# Patient Record
Sex: FEMALE | Race: WHITE | Employment: FULL TIME | URBAN - METROPOLITAN AREA
[De-identification: names, ages, dates, MRNs, and addresses within clinical notes are randomized per-mention and may not be internally consistent; named-entity substitution may affect disease eponyms.]

---

## 2017-02-01 DIAGNOSIS — K21.9 GASTROESOPHAGEAL REFLUX DISEASE WITHOUT ESOPHAGITIS: ICD-10-CM

## 2017-02-01 RX ORDER — BUPROPION HYDROCHLORIDE 300 MG/1
300 TABLET ORAL
Qty: 90 TAB | Refills: 3 | Status: SHIPPED | OUTPATIENT
Start: 2017-02-01 | End: 2017-02-02 | Stop reason: SDUPTHER

## 2017-02-01 RX ORDER — ESCITALOPRAM OXALATE 20 MG/1
20 TABLET ORAL DAILY
Qty: 90 TAB | Refills: 3 | Status: SHIPPED | OUTPATIENT
Start: 2017-02-01 | End: 2017-02-02 | Stop reason: SDUPTHER

## 2017-02-01 RX ORDER — LOSARTAN POTASSIUM 50 MG/1
50 TABLET ORAL DAILY
Qty: 90 TAB | Refills: 3 | Status: SHIPPED | OUTPATIENT
Start: 2017-02-01 | End: 2017-02-02 | Stop reason: SDUPTHER

## 2017-02-01 RX ORDER — PANTOPRAZOLE SODIUM 40 MG/1
40 TABLET, DELAYED RELEASE ORAL DAILY
Qty: 90 TAB | Refills: 3 | Status: SHIPPED | OUTPATIENT
Start: 2017-02-01 | End: 2017-02-02 | Stop reason: SDUPTHER

## 2017-02-02 DIAGNOSIS — K21.9 GASTROESOPHAGEAL REFLUX DISEASE WITHOUT ESOPHAGITIS: ICD-10-CM

## 2017-02-03 DIAGNOSIS — K21.9 GASTROESOPHAGEAL REFLUX DISEASE WITHOUT ESOPHAGITIS: ICD-10-CM

## 2017-02-03 RX ORDER — PANTOPRAZOLE SODIUM 40 MG/1
40 TABLET, DELAYED RELEASE ORAL DAILY
Qty: 30 TAB | Refills: 3 | Status: SHIPPED | OUTPATIENT
Start: 2017-02-03 | End: 2018-04-23 | Stop reason: SDUPTHER

## 2017-02-03 RX ORDER — ESCITALOPRAM OXALATE 20 MG/1
20 TABLET ORAL DAILY
Qty: 90 TAB | Refills: 3 | Status: SHIPPED | OUTPATIENT
Start: 2017-02-03 | End: 2017-02-03 | Stop reason: SDUPTHER

## 2017-02-03 RX ORDER — ESCITALOPRAM OXALATE 20 MG/1
20 TABLET ORAL DAILY
Qty: 30 TAB | Refills: 3 | Status: SHIPPED | OUTPATIENT
Start: 2017-02-03 | End: 2017-12-18 | Stop reason: SDUPTHER

## 2017-02-03 RX ORDER — LOSARTAN POTASSIUM 50 MG/1
50 TABLET ORAL DAILY
Qty: 30 TAB | Refills: 3 | Status: SHIPPED | OUTPATIENT
Start: 2017-02-03 | End: 2018-04-23 | Stop reason: SDUPTHER

## 2017-02-03 RX ORDER — LOSARTAN POTASSIUM 50 MG/1
50 TABLET ORAL DAILY
Qty: 90 TAB | Refills: 3 | Status: SHIPPED | OUTPATIENT
Start: 2017-02-03 | End: 2017-02-03 | Stop reason: SDUPTHER

## 2017-02-03 RX ORDER — PANTOPRAZOLE SODIUM 40 MG/1
40 TABLET, DELAYED RELEASE ORAL DAILY
Qty: 90 TAB | Refills: 3 | Status: SHIPPED | OUTPATIENT
Start: 2017-02-03 | End: 2017-02-03 | Stop reason: SDUPTHER

## 2017-02-03 RX ORDER — BUPROPION HYDROCHLORIDE 300 MG/1
300 TABLET ORAL
Qty: 90 TAB | Refills: 3 | Status: SHIPPED | OUTPATIENT
Start: 2017-02-03 | End: 2017-02-03 | Stop reason: SDUPTHER

## 2017-02-03 RX ORDER — BUPROPION HYDROCHLORIDE 300 MG/1
300 TABLET ORAL
Qty: 30 TAB | Refills: 3 | Status: SHIPPED | OUTPATIENT
Start: 2017-02-03 | End: 2017-12-18 | Stop reason: SDUPTHER

## 2017-03-03 DIAGNOSIS — J20.9 BRONCHITIS WITH BRONCHOSPASM: ICD-10-CM

## 2017-03-03 RX ORDER — PROMETHAZINE HYDROCHLORIDE AND DEXTROMETHORPHAN HYDROBROMIDE 6.25; 15 MG/5ML; MG/5ML
5 SYRUP ORAL
Qty: 240 ML | Refills: 0 | OUTPATIENT
Start: 2017-03-03 | End: 2017-08-23 | Stop reason: ALTCHOICE

## 2017-03-03 RX ORDER — AMOXICILLIN AND CLAVULANATE POTASSIUM 875; 125 MG/1; MG/1
1 TABLET, FILM COATED ORAL 2 TIMES DAILY
Qty: 20 TAB | Refills: 0 | OUTPATIENT
Start: 2017-03-03 | End: 2017-03-13

## 2017-03-03 RX ORDER — PREDNISONE 10 MG/1
TABLET ORAL
Qty: 21 TAB | Refills: 0 | OUTPATIENT
Start: 2017-03-03 | End: 2017-05-26 | Stop reason: ALTCHOICE

## 2017-03-07 ENCOUNTER — HOSPITAL ENCOUNTER (OUTPATIENT)
Dept: LAB | Age: 67
Discharge: HOME | End: 2017-03-07
Payer: MEDICARE

## 2017-03-07 ENCOUNTER — OFFICE VISIT (OUTPATIENT)
Dept: FAMILY MEDICINE CLINIC | Age: 67
End: 2017-03-07

## 2017-03-07 VITALS
RESPIRATION RATE: 16 BRPM | BODY MASS INDEX: 38.86 KG/M2 | HEART RATE: 74 BPM | OXYGEN SATURATION: 97 % | SYSTOLIC BLOOD PRESSURE: 113 MMHG | HEIGHT: 68 IN | DIASTOLIC BLOOD PRESSURE: 62 MMHG | TEMPERATURE: 97.9 F | WEIGHT: 256.4 LBS

## 2017-03-07 DIAGNOSIS — J20.9 BRONCHITIS WITH BRONCHOSPASM: Primary | ICD-10-CM

## 2017-03-07 DIAGNOSIS — F32.A DEPRESSION, UNSPECIFIED DEPRESSION TYPE: ICD-10-CM

## 2017-03-07 DIAGNOSIS — Z51.81 ENCOUNTER FOR MEDICATION MONITORING: ICD-10-CM

## 2017-03-07 DIAGNOSIS — J20.9 BRONCHITIS, ACUTE, WITH BRONCHOSPASM: ICD-10-CM

## 2017-03-07 DIAGNOSIS — I10 ESSENTIAL HYPERTENSION, BENIGN: ICD-10-CM

## 2017-03-07 DIAGNOSIS — E78.00 HYPERCHOLESTEROLEMIA: ICD-10-CM

## 2017-03-07 DIAGNOSIS — R73.02 GLUCOSE INTOLERANCE (IMPAIRED GLUCOSE TOLERANCE): ICD-10-CM

## 2017-03-07 LAB
GLUCOSE POC: 129 MG/DL
HBA1C MFR BLD HPLC: 5.9 %

## 2017-03-07 PROCEDURE — 80048 BASIC METABOLIC PNL TOTAL CA: CPT

## 2017-03-07 PROCEDURE — 36415 COLL VENOUS BLD VENIPUNCTURE: CPT

## 2017-03-07 RX ORDER — FLUTICASONE PROPIONATE AND SALMETEROL 250; 50 UG/1; UG/1
1 POWDER RESPIRATORY (INHALATION) EVERY 12 HOURS
Qty: 1 EACH | Refills: 3 | Status: SHIPPED | OUTPATIENT
Start: 2017-03-07 | End: 2018-05-15

## 2017-03-07 RX ORDER — BUDESONIDE AND FORMOTEROL FUMARATE DIHYDRATE 160; 4.5 UG/1; UG/1
1 AEROSOL RESPIRATORY (INHALATION) 2 TIMES DAILY
Qty: 1 INHALER | Refills: 3 | Status: SHIPPED | OUTPATIENT
Start: 2017-03-07 | End: 2017-03-07

## 2017-03-07 RX ORDER — ALBUTEROL SULFATE 90 UG/1
1-2 AEROSOL, METERED RESPIRATORY (INHALATION)
Qty: 1 INHALER | Refills: 11 | Status: SHIPPED | OUTPATIENT
Start: 2017-03-07 | End: 2017-03-24 | Stop reason: SDUPTHER

## 2017-03-07 RX ORDER — ALBUTEROL SULFATE 90 UG/1
2 AEROSOL, METERED RESPIRATORY (INHALATION)
Qty: 1 INHALER | Refills: 3 | Status: SHIPPED | OUTPATIENT
Start: 2017-03-07 | End: 2017-03-07

## 2017-03-07 RX ORDER — DESONIDE 0.5 MG/G
OINTMENT TOPICAL
Refills: 2 | COMMUNITY
Start: 2017-01-11 | End: 2019-04-30

## 2017-03-07 RX ORDER — PREDNISONE 10 MG/1
TABLET ORAL
Qty: 50 TAB | Refills: 0 | Status: SHIPPED | OUTPATIENT
Start: 2017-03-07 | End: 2017-05-26 | Stop reason: ALTCHOICE

## 2017-03-07 NOTE — PROGRESS NOTES
Chief Complaint   Patient presents with    Hypertension    Cough     pt coughing up green sputum x 5 days         BMP 10/26/2016    Lipid 10/26/2016    A1C 10/26/2016    Mammogram 4/8/2015    Eye exam 3/2016 by MD at [facility]. Colonoscopy 5/20/2016 by Dr. Malik Gonzalez- repeat in 5 yrs.

## 2017-03-07 NOTE — PROGRESS NOTES
HISTORY OF PRESENT ILLNESS  Leslie Lyles is a 73 y.o. female. HPI   Follow up on chronic medical problems. C/o nasal congestion and chest congestion and cough for the past week. Coughing up phlegm. No fever or chills noted. Throat is scratchy. Has post nasal drainage. No chest pain or SOB. Has had some slight wheezing noted. Started on Augmentin on this past Sunday but chest is till feeling congested. Started on steroids on this past Friday but still has the wheezing. Cardiovascular Review:  The patient has hypertension, hyperlipidemia and obesity. H/o PAF and SVT. S/p ablation. Diet and Lifestyle: generally follows a low fat low cholesterol diet, generally follows a low sodium diet, follows a diabetic diet regularly, exercises regularly, attended class at the [employer] which was helpful. Home BP Monitoring: is not measured at home. Pertinent ROS: taking medications as instructed, no medication side effects noted, no TIA's, no chest pain on exertion, no dyspnea on exertion, no swelling of ankles. Diabetes Mellitus:  She has glucose intolerance. Last a1c level however was 5.4% in 1/16. Diabetic ROS - diabetic diet compliance: compliant most of the time, further diabetic ROS: no polyuria or polydipsia, no chest pain, dyspnea or TIA's, no numbness, tingling or pain in extremities, no unusual visual symptoms. Lab review: orders written for new lab studies as appropriate; see orders. Depression Review:  Patient is seen for followup of depression. Treatment includes Wellbutrin, Lexapro. Ongoing symptoms include none, feeling great. She denies depressed mood, insomnia and fatigue. She experiences the following side effects from the treatment: none.     Patient Active Problem List   Diagnosis Code    Depression F32.9    Anxiety F41.9    Hypercholesterolemia E78.00    Renal artery stenosis (HCC) I70.1    History of hepatitis B Z86.19    Glucose intolerance (impaired glucose tolerance) R73.02    Essential hypertension, benign I5    Encounter for medication monitoring Z51.81    H/O atrial fibrillation without current medication Z86.79       Current Outpatient Prescriptions   Medication Sig Dispense Refill    desonide (DESOWEN) 0.05 % topical ointment as needed  2    albuterol (PROVENTIL HFA, VENTOLIN HFA, PROAIR HFA) 90 mcg/actuation inhaler Take 1-2 Puffs by inhalation every four (4) hours as needed for Wheezing. 1 Inhaler 11    predniSONE (DELTASONE) 10 mg tablet Taper as directed. 50 Tab 0    budesonide-formoterol (SYMBICORT) 160-4.5 mcg/actuation HFA inhaler Take 1 Puff by inhalation two (2) times a day. 1 Inhaler 3    promethazine-dextromethorphan (PROMETHAZINE-DM) 6.25-15 mg/5 mL syrup Take 5 mL by mouth four (4) times daily as needed for Cough. 240 mL 0    predniSONE (STERAPRED DS) 10 mg dose pack See administration instruction per 10mg dose pack 21 Tab 0    amoxicillin-clavulanate (AUGMENTIN) 875-125 mg per tablet Take 1 Tab by mouth two (2) times a day for 10 days. 20 Tab 0    losartan (COZAAR) 50 mg tablet Take 1 Tab by mouth daily. 30 Tab 3    pantoprazole (PROTONIX) 40 mg tablet Take 1 Tab by mouth daily. 30 Tab 3    escitalopram oxalate (LEXAPRO) 20 mg tablet Take 1 Tab by mouth daily. 30 Tab 3    buPROPion XL (WELLBUTRIN XL) 300 mg XL tablet Take 1 Tab by mouth every morning. 30 Tab 3    estradiol (ESTRACE) 0.5 mg tablet Take 0.5 mg by mouth daily.  chlorpheniramine-HYDROcodone (TUSSIONEX) 10-8 mg/5 mL suspension Take 5 mL by mouth every twelve (12) hours as needed for Cough or Congestion. 180 mL 1    fluorouracil (EFUDEX) 5 % chemo cream daily as needed. 0    mupirocin (BACTROBAN) 2 % ointment as needed. 11    nystatin (MYCOSTATIN) topical cream Apply  to affected area as needed. 11    triamcinolone acetonide (KENALOG) 0.1 % ointment Apply  to affected area as needed.   11    metoprolol succinate (TOPROL-XL) 100 mg XL tablet Take 1 Tab by mouth daily. 90 Tab 3    loratadine (CLARITIN) 10 mg tablet Take 10 mg by mouth daily as needed for Allergies.  fluticasone (FLONASE) 50 mcg/actuation nasal spray 2 Sprays by Both Nostrils route daily as needed for Rhinitis. 3 Bottle 3    cinnamon bark (CINNAMON) 500 mg cap Take 1,000 mg by mouth two (2) times a day.  KRILL OIL PO Take 300 mg by mouth daily.  Cholecalciferol, Vitamin D3, (VITAMIN D) 2,000 unit Cap Take 1 Tab by mouth daily.  MULTIVITAMIN PO Take 1 Tab by mouth daily.          Allergies   Allergen Reactions    Iodinated Contrast Media - Oral And Iv Dye Swelling    Clindamycin Nausea and Vomiting    Codeine Other (comments)     Headaches      Morphine Other (comments)     headache       Past Medical History:   Diagnosis Date    Anxiety     Arrhythmia     A-Fib    Depression     History of hepatitis B     Hypercholesterolemia     Hypertension     Renal artery stenosis ([facility] [address] 2006       Past Surgical History:   Procedure Laterality Date    CARDIAC SURG PROCEDURE UNLIST  4/28/2015    cardiac ablation     HX BLEPHAROPLASTY  04/26/2016    HX HYSTERECTOMY  1988    [place] COLONOSCOPY FLX DX W/COLLJ SPEC WHEN PFRMD  12/01/2010    dr Hilario You       Family History   Problem Relation Age of Onset    Cancer Mother     Cancer Father      lung    Heart Disease Brother     Heart Attack Brother     Lung Disease Brother      COPD    No Known Problems Sister     Hypertension Brother        Social History   Substance Use Topics    Smoking status: Never Smoker    Smokeless tobacco: Never Used    Alcohol use 0.0 oz/week     0 Standard drinks or equivalent per week      Comment: socially        Lab Results  Component Value Date/Time   WBC 5.9 10/26/2016 07:50 AM   HGB 13.7 10/26/2016 07:50 AM   HCT 40.9 10/26/2016 07:50 AM   PLATELET 299 00/85/4997 07:50 AM   MCV 93 10/26/2016 07:50 AM       Lab Results  Component Value Date/Time   Hemoglobin A1c 5.7 03/27/2013 01:20 PM   Hemoglobin A1c 6.0 02/11/2012 11:34 AM   Hemoglobin A1c 6.0 10/22/2011 09:00 AM   Glucose 128 10/26/2016 07:50 AM   Glucose  03/07/2017 02:35 PM   LDL, calculated 152 10/26/2016 07:50 AM   Creatinine 1.00 10/26/2016 07:50 AM      Lab Results  Component Value Date/Time   Cholesterol, total 240 10/26/2016 07:50 AM   HDL Cholesterol 54 10/26/2016 07:50 AM   LDL, calculated 152 10/26/2016 07:50 AM   Triglyceride 168 10/26/2016 07:50 AM       Lab Results  Component Value Date/Time   TSH 3.880 10/26/2016 07:50 AM   T4, Free 1.14 01/28/2015 02:43 PM      Lab Results   Component Value Date/Time    Sodium 142 10/26/2016 07:50 AM    Potassium 4.6 10/26/2016 07:50 AM    Chloride 101 10/26/2016 07:50 AM    CO2 25 10/26/2016 07:50 AM    Glucose 128 10/26/2016 07:50 AM    BUN 14 10/26/2016 07:50 AM    Creatinine 1.00 10/26/2016 07:50 AM    BUN/Creatinine ratio 14 10/26/2016 07:50 AM    GFR est AA 68 10/26/2016 07:50 AM    GFR est non-AA 59 10/26/2016 07:50 AM    Calcium 9.2 10/26/2016 07:50 AM    Bilirubin, total 0.3 01/20/2016 10:07 AM    ALT (SGPT) 20 01/20/2016 10:07 AM    AST (SGOT) 24 01/20/2016 10:07 AM    Alk. phosphatase 81 01/20/2016 10:07 AM    Protein, total 6.4 01/20/2016 10:07 AM    Albumin 4.0 01/20/2016 10:07 AM    A-G Ratio 1.7 01/20/2016 10:07 AM      Lab Results   Component Value Date/Time    Hemoglobin A1c 5.7 03/27/2013 01:20 PM    Hemoglobin A1c (POC) 5.9 03/07/2017 02:35 PM         Review of Systems   Constitutional: Negative for malaise/fatigue. HENT: Positive for congestion. Eyes: Negative for blurred vision. Respiratory: Positive for cough and sputum production. Negative for shortness of breath. Cardiovascular: Negative for chest pain, palpitations and leg swelling. Gastrointestinal: Negative for abdominal pain, constipation and heartburn. Genitourinary: Negative for dysuria, frequency and urgency. Musculoskeletal: Negative for back pain and joint pain.    Neurological: Negative for dizziness, tingling and headaches. Endo/Heme/Allergies: Positive for environmental allergies. Psychiatric/Behavioral: Negative for depression. The patient does not have insomnia. Physical Exam   Constitutional: She appears well-developed and well-nourished. /62 (BP 1 Location: Left arm, BP Patient Position: Sitting)  Pulse 74  Temp 97.9 °F (36.6 °C) (Oral)   Resp 16  Ht 5' 8\" (1.727 m)  Wt 256 lb 6.4 oz (116.3 kg)  SpO2 97%   L/min  BMI 38.99 kg/m2     HENT:   Right Ear: Tympanic membrane and ear canal normal.   Left Ear: Tympanic membrane and ear canal normal.   Nose: Mucosal edema and rhinorrhea present. Mouth/Throat: Mucous membranes are normal. Posterior oropharyngeal erythema present. No oropharyngeal exudate. Neck: Trachea normal, normal range of motion and full passive range of motion without pain. Neck supple. No edema present. No thyromegaly present. Cardiovascular: Normal rate and regular rhythm. No murmur heard. Pulmonary/Chest: Effort normal and breath sounds normal. She has no wheezes. She has no rales. Abdominal: Soft. Normal appearance and bowel sounds are normal. There is no tenderness. Musculoskeletal: Normal range of motion. She exhibits no edema. Lymphadenopathy:     She has no cervical adenopathy. Skin: Skin is warm and dry. Psychiatric: She has a normal mood and affect. Nursing note and vitals reviewed. ASSESSMENT and PLAN  Miranda Hdz was seen today for hypertension and cough. Diagnoses and all orders for this visit:    Bronchitis with bronchospasm  -     XR CHEST PA LAT; Future  -     albuterol (PROVENTIL HFA, VENTOLIN HFA, PROAIR HFA) 90 mcg/actuation inhaler; Take 1-2 Puffs by inhalation every four (4) hours as needed for Wheezing.  -     predniSONE (DELTASONE) 10 mg tablet; Taper as directed. -     budesonide-formoterol (SYMBICORT) 160-4.5 mcg/actuation HFA inhaler; Take 1 Puff by inhalation two (2) times a day. Complete Augmentin. Essential hypertension, benign  Stable     Hypercholesterolemia  Will return to get fasting labs done. Glucose intolerance (impaired glucose tolerance)  -     AMB POC HEMOGLOBIN A1C  -     AMB POC GLUCOSE, QUANTITATIVE, BLOOD    Depression, unspecified depression type  Stable     Encounter for medication monitoring  -     METABOLIC PANEL, BASIC      Follow-up Disposition:  Return in about 6 months (around 9/7/2017). reviewed diet, exercise and weight control  cardiovascular risk and specific lipid/LDL goals reviewed  reviewed medications and side effects in detail  specific diabetic recommendations: low cholesterol diet, weight control and daily exercise discussed and glycohemoglobin and other lab monitoring discussed     I have discussed diagnosis listed in this note with pt and/or family. I have discussed treatment plans and options and the risk/benefit analysis of those options, including safe use of medications and possible medication side effects. Through the use of shared decision making we have agreed to the above plan. The patient has received an after-visit summary and questions were answered concerning future plans and follow up. Advise pt of any urgent changes then to proceed to the ER.

## 2017-03-07 NOTE — MR AVS SNAPSHOT
Visit Information Date & Time Provider Department Dept. Phone Encounter #  
 3/7/2017  2:00 PM Meche VasquezPop [phone] [id number] Follow-up Instructions Return in about 6 months (around 9/7/2017). Upcoming Health Maintenance Date Due Pneumococcal 65+ Low/Medium Risk (1 of 2 - PCV13) 8/31/2016 MEDICARE YEARLY EXAM 8/31/2016 GLAUCOMA SCREENING Q2Y 3/14/2018 BREAST CANCER SCRN MAMMOGRAM 4/8/2018 COLONOSCOPY 5/20/2021 DTaP/Tdap/Td series (2 - Td) 3/19/2024 Allergies as of 3/7/2017  Review Complete On: 3/7/2017 By: Meche Vasquez MD  
  
 Severity Noted Reaction Type Reactions Iodinated Contrast Media - Oral And Iv Dye High 03/27/2013   Systemic Swelling Clindamycin  06/27/2011    Nausea and Vomiting Codeine  06/27/2011    Other (comments) Headaches Morphine  06/27/2011    Other (comments)  
 headache Current Immunizations  Reviewed on 3/7/2017 Name Date Influenza Vaccine 10/17/2016, 10/12/2015 Influenza Vaccine Split 9/24/2012 Tdap 3/19/2014 Zoster Vaccine, Live 6/1/2014 Reviewed by Meche Vasquez MD on 3/7/2017 at  1:59 PM  
 Reviewed by Lakisha Patricio LPN on 9/5/8518 at  6:16 PM  
You Were Diagnosed With   
  
 Codes Comments Bronchitis with bronchospasm    -  Primary ICD-10-CM: J20.9 ICD-9-CM: 703 Essential hypertension, benign     ICD-10-CM: I10 
ICD-9-CM: 401.1 Glucose intolerance (impaired glucose tolerance)     ICD-10-CM: R73.02 
ICD-9-CM: 790.22 Depression, unspecified depression type     ICD-10-CM: F32.9 ICD-9-CM: 133 Hypercholesterolemia     ICD-10-CM: E78.00 ICD-9-CM: 272.0 Encounter for medication monitoring     ICD-10-CM: Z51.81 
ICD-9-CM: V58.83 Bronchitis, acute, with bronchospasm     ICD-10-CM: J20.9 ICD-9-CM: 466.0 Vitals BP Pulse Temp Resp Height(growth percentile) Weight(growth percentile) 113/62 (BP 1 Location: Left arm, BP Patient Position: Sitting) 74 97.9 °F (36.6 °C) (Oral) 16 5' 8\" (1.727 m) 256 lb 6.4 oz (116.3 kg) SpO2 PF BMI OB Status Smoking Status 97% 320 L/min 38.99 kg/m2 Hysterectomy Never Smoker BMI and BSA Data Body Mass Index Body Surface Area  
 38.99 kg/m 2 2.36 m 2 Preferred Pharmacy Pharmacy Name Phone [facility] [phone] [facility] [phone] Your Updated Medication List  
  
   
This list is accurate as of: 3/7/17  3:01 PM.  Always use your most recent med list.  
  
  
  
  
 albuterol 90 mcg/actuation inhaler Commonly known as:  PROVENTIL HFA, VENTOLIN HFA, PROAIR HFA Take 1-2 Puffs by inhalation every four (4) hours as needed for Wheezing. amoxicillin-clavulanate 875-125 mg per tablet Commonly known as:  AUGMENTIN Take 1 Tab by mouth two (2) times a day for 10 days. budesonide-formoterol 160-4.5 mcg/actuation HFA inhaler Commonly known as:  SYMBICORT Take 1 Puff by inhalation two (2) times a day. buPROPion  mg XL tablet Commonly known as:  Nataly Bannister Take 1 Tab by mouth every morning. chlorpheniramine-HYDROcodone 10-8 mg/5 mL suspension Commonly known as:  Rozina Horsfall Take 5 mL by mouth every twelve (12) hours as needed for Cough or Congestion. CINNAMON 500 mg Cap Generic drug:  cinnamon bark Take 1,000 mg by mouth two (2) times a day. CLARITIN 10 mg tablet Generic drug:  loratadine Take 10 mg by mouth daily as needed for Allergies. desonide 0.05 % topical ointment Commonly known as:  DESOWEN  
as needed  
  
 escitalopram oxalate 20 mg tablet Commonly known as:  Mauro Andino Take 1 Tab by mouth daily. estradiol 0.5 mg tablet Commonly known as:  ESTRACE Take 0.5 mg by mouth daily. fluorouracil 5 % chemo cream  
Commonly known as:  EFUDEX  
daily as needed. fluticasone 50 mcg/actuation nasal spray Commonly known as:  [employer] 2 Sprays by Both Nostrils route daily as needed for Rhinitis. KRILL OIL PO Take 300 mg by mouth daily. losartan 50 mg tablet Commonly known as:  COZAAR Take 1 Tab by mouth daily. metoprolol succinate 100 mg tablet Commonly known as:  TOPROL-XL Take 1 Tab by mouth daily. MULTIVITAMIN PO Take 1 Tab by mouth daily. mupirocin 2 % ointment Commonly known as:  [facility] [facility]  
as needed. nystatin topical cream  
Commonly known as:  MYCOSTATIN Apply  to affected area as needed. pantoprazole 40 mg tablet Commonly known as:  PROTONIX Take 1 Tab by mouth daily. * predniSONE 10 mg dose pack Commonly known as:  STERAPRED DS See administration instruction per 10mg dose pack * predniSONE 10 mg tablet Commonly known as:  Maria A Pittman Taper as directed. promethazine-dextromethorphan 6.25-15 mg/5 mL syrup Commonly known as:  PROMETHAZINE-DM Take 5 mL by mouth four (4) times daily as needed for Cough. triamcinolone acetonide 0.1 % ointment Commonly known as:  KENALOG Apply  to affected area as needed. VITAMIN D 2,000 unit Cap capsule Generic drug:  Cholecalciferol (Vitamin D3) Take 1 Tab by mouth daily. * Notice: This list has 2 medication(s) that are the same as other medications prescribed for you. Read the directions carefully, and ask your doctor or other care provider to review them with you. Prescriptions Sent to Pharmacy Refills  
 albuterol (PROVENTIL HFA, VENTOLIN HFA, PROAIR HFA) 90 mcg/actuation inhaler 11 Sig: Take 1-2 Puffs by inhalation every four (4) hours as needed for Wheezing. Class: Normal  
 Pharmacy: [facility] [address] [place] [address] - [address] [facility] Ph #: [phone] Route: Inhalation  
 predniSONE (DELTASONE) 10 mg tablet 0 Sig: Taper as directed. Class: Normal  
 Pharmacy: [facility] [address] [place] [address] Ph #: [phone]  
 budesonide-formoterol (SYMBICORT) 160-4.5 mcg/actuation HFA inhaler 3 Sig: Take 1 Puff by inhalation two (2) times a day. Class: Normal  
 Pharmacy: [facility] [address] - [address] [place] [address] Ph #: [phone] Route: Inhalation We Performed the Following AMB POC GLUCOSE, QUANTITATIVE, BLOOD [63786 CPT(R)] AMB POC HEMOGLOBIN A1C [62823 CPT(R)] METABOLIC PANEL, BASIC [90807 CPT(R)] Follow-up Instructions Return in about 6 months (around 9/7/2017). To-Do List   
 03/07/2017 Imaging:  XR CHEST PA LAT Introducing [facility]! Dear Layo Alvarado: 
Thank you for requesting a [employer] account. Our records indicate that you already have an active [employer] account. You can access your account anytime at [email or web address] Did you know that you can access your hospital and ER discharge instructions at any time in [employer]? You can also review all of your test results from your hospital stay or ER visit. Additional Information If you have questions, please visit the Frequently Asked Questions section of the [employer] website at [email or web address] Remember, [employer] is NOT to be used for urgent needs. For medical emergencies, dial 911. Now available from your iPhone and Android! Please provide this summary of care documentation to your next provider. Your primary care clinician is listed as Krysten Calloway. If you have any questions after today's visit, please call [phone].

## 2017-03-08 LAB
BUN SERPL-MCNC: 17 MG/DL (ref 8–27)
BUN/CREAT SERPL: 15 (ref 11–26)
CALCIUM SERPL-MCNC: 9.5 MG/DL (ref 8.7–10.3)
CHLORIDE SERPL-SCNC: 100 MMOL/L (ref 96–106)
CO2 SERPL-SCNC: 25 MMOL/L (ref 18–29)
CREAT SERPL-MCNC: 1.14 MG/DL (ref 0.57–1)
GLUCOSE SERPL-MCNC: 130 MG/DL (ref 65–99)
INTERPRETATION: NORMAL
POTASSIUM SERPL-SCNC: 4.4 MMOL/L (ref 3.5–5.2)
SODIUM SERPL-SCNC: 144 MMOL/L (ref 134–144)

## 2017-03-24 DIAGNOSIS — J20.9 BRONCHITIS WITH BRONCHOSPASM: ICD-10-CM

## 2017-03-24 RX ORDER — ALBUTEROL SULFATE 90 UG/1
1-2 AEROSOL, METERED RESPIRATORY (INHALATION)
Qty: 1 INHALER | Refills: 11 | Status: SHIPPED | OUTPATIENT
Start: 2017-03-24

## 2017-05-16 ENCOUNTER — TELEPHONE (OUTPATIENT)
Dept: FAMILY MEDICINE CLINIC | Age: 67
End: 2017-05-16

## 2017-05-16 NOTE — TELEPHONE ENCOUNTER
----- Message from Devin Uribe sent at 5/16/2017  8:33 AM EDT -----  Regarding: /Telephone  Pt stated she is still coughing up green and on day 5 of \"Augmentin\" Rx and wants to know what else is there to do. Best contact [phone].

## 2017-05-23 DIAGNOSIS — J20.9 BRONCHITIS, ACUTE, WITH BRONCHOSPASM: ICD-10-CM

## 2017-05-23 NOTE — TELEPHONE ENCOUNTER
Pt would like an rx for chlorpheniramine-HYDROcodone (TUSSIONEX) 10-8 mg/5 mL suspension    Please call # [phone] when ready for

## 2017-05-26 ENCOUNTER — OFFICE VISIT (OUTPATIENT)
Dept: FAMILY MEDICINE CLINIC | Age: 67
End: 2017-05-26

## 2017-05-26 VITALS
SYSTOLIC BLOOD PRESSURE: 140 MMHG | OXYGEN SATURATION: 97 % | HEART RATE: 70 BPM | WEIGHT: 265.8 LBS | BODY MASS INDEX: 40.28 KG/M2 | TEMPERATURE: 97.1 F | HEIGHT: 68 IN | RESPIRATION RATE: 16 BRPM | DIASTOLIC BLOOD PRESSURE: 69 MMHG

## 2017-05-26 DIAGNOSIS — J20.9 ACUTE BRONCHITIS WITH BRONCHOSPASM: Primary | ICD-10-CM

## 2017-05-26 RX ORDER — ALBUTEROL SULFATE 0.83 MG/ML
2.5 SOLUTION RESPIRATORY (INHALATION)
Qty: 24 EACH | Refills: 11 | Status: SHIPPED | OUTPATIENT
Start: 2017-05-26

## 2017-05-26 RX ORDER — IPRATROPIUM BROMIDE AND ALBUTEROL SULFATE 2.5; .5 MG/3ML; MG/3ML
3 SOLUTION RESPIRATORY (INHALATION)
Qty: 30 NEBULE | Refills: 5 | Status: CANCELLED | OUTPATIENT
Start: 2017-05-26

## 2017-05-26 RX ORDER — PREDNISONE 10 MG/1
TABLET ORAL
Qty: 50 TAB | Refills: 1 | Status: SHIPPED | OUTPATIENT
Start: 2017-05-26 | End: 2017-08-23 | Stop reason: ALTCHOICE

## 2017-05-26 RX ORDER — DEXAMETHASONE SODIUM PHOSPHATE 10 MG/ML
10 INJECTION INTRAMUSCULAR; INTRAVENOUS ONCE
Qty: 1 ML | Refills: 0
Start: 2017-05-26 | End: 2017-05-26 | Stop reason: SDUPTHER

## 2017-05-26 RX ORDER — DEXAMETHASONE SODIUM PHOSPHATE 10 MG/ML
10 INJECTION INTRAMUSCULAR; INTRAVENOUS ONCE
Qty: 1 ML | Refills: 0
Start: 2017-05-26 | End: 2017-05-26

## 2017-05-26 RX ORDER — HYDROCODONE POLISTIREX AND CHLORPHENIRAMINE POLISTIREX 10; 8 MG/5ML; MG/5ML
5 SUSPENSION, EXTENDED RELEASE ORAL
Qty: 180 ML | Refills: 0 | Status: SHIPPED | OUTPATIENT
Start: 2017-05-26 | End: 2017-10-16 | Stop reason: ALTCHOICE

## 2017-05-26 RX ORDER — LEVOFLOXACIN 500 MG/1
500 TABLET, FILM COATED ORAL DAILY
Qty: 7 TAB | Refills: 0 | Status: SHIPPED | OUTPATIENT
Start: 2017-05-26 | End: 2017-08-23 | Stop reason: SDUPTHER

## 2017-05-26 RX ORDER — ALBUTEROL SULFATE 0.83 MG/ML
2.5 SOLUTION RESPIRATORY (INHALATION) ONCE
Qty: 1 EACH | Refills: 0
Start: 2017-05-26 | End: 2017-05-26

## 2017-05-26 RX ORDER — TRIAMCINOLONE ACETONIDE 1 MG/G
CREAM TOPICAL
Refills: 1 | COMMUNITY
Start: 2017-05-15 | End: 2018-05-15 | Stop reason: SDUPTHER

## 2017-05-26 NOTE — PROGRESS NOTES
HISTORY OF PRESENT ILLNESS  Nubia Varma is a 73 y.o. female. HPI   C/o nasal congestion and chest congestion and cough for the past 2 weeks. Coughing up phlegm. No fever or chills noted. Throat is scratchy. Has post nasal drainage. No chest pain or SOB. Has had some slight wheezing noted. Started on Augmentin on this past Sunday but chest is still feeling congested. Started on steroids on this past Monday but still has been wheezing. Patient Active Problem List   Diagnosis Code    Depression F32.9    Anxiety F41.9    Hypercholesterolemia E78.00    Renal artery stenosis (HCC) I70.1    History of hepatitis B Z86.19    Glucose intolerance (impaired glucose tolerance) R73.02    Essential hypertension, benign I10    Encounter for medication monitoring Z51.81    H/O atrial fibrillation without current medication Z86.79       Current Outpatient Prescriptions   Medication Sig Dispense Refill    triamcinolone acetonide (KENALOG) 0.1 % topical cream APPLY TO AFFECTED AREA BID FOR 7 DAYS  1    levoFLOXacin (LEVAQUIN) 500 mg tablet Take 1 Tab by mouth daily for 7 days. 7 Tab 0    predniSONE (DELTASONE) 10 mg tablet Taper as directed 50 Tab 1    albuterol (PROVENTIL VENTOLIN) 2.5 mg /3 mL (0.083 %) nebulizer solution 3 mL by Nebulization route every four (4) hours as needed for Wheezing. 24 Each 11    albuterol (PROVENTIL HFA, VENTOLIN HFA, PROAIR HFA) 90 mcg/actuation inhaler Take 1-2 Puffs by inhalation every four (4) hours as needed for Wheezing. 1 Inhaler 11    desonide (DESOWEN) 0.05 % topical ointment as needed  2    fluticasone-salmeterol (ADVAIR) 250-50 mcg/dose diskus inhaler Take 1 Puff by inhalation every twelve (12) hours. 1 Each 3    promethazine-dextromethorphan (PROMETHAZINE-DM) 6.25-15 mg/5 mL syrup Take 5 mL by mouth four (4) times daily as needed for Cough. 240 mL 0    losartan (COZAAR) 50 mg tablet Take 1 Tab by mouth daily.  30 Tab 3    pantoprazole (PROTONIX) 40 mg tablet Take 1 Tab by mouth daily. 30 Tab 3    escitalopram oxalate (LEXAPRO) 20 mg tablet Take 1 Tab by mouth daily. 30 Tab 3    buPROPion XL (WELLBUTRIN XL) 300 mg XL tablet Take 1 Tab by mouth every morning. 30 Tab 3    estradiol (ESTRACE) 0.5 mg tablet Take 0.5 mg by mouth daily.  fluorouracil (EFUDEX) 5 % chemo cream daily as needed. 0    mupirocin (BACTROBAN) 2 % ointment as needed. 11    nystatin (MYCOSTATIN) topical cream Apply  to affected area as needed. 11    triamcinolone acetonide (KENALOG) 0.1 % ointment Apply  to affected area as needed. 11    metoprolol succinate (TOPROL-XL) 100 mg XL tablet Take 1 Tab by mouth daily. 90 Tab 3    loratadine (CLARITIN) 10 mg tablet Take 10 mg by mouth daily as needed for Allergies.  fluticasone (FLONASE) 50 mcg/actuation nasal spray 2 Sprays by Both Nostrils route daily as needed for Rhinitis. 3 Bottle 3    cinnamon bark (CINNAMON) 500 mg cap Take 1,000 mg by mouth two (2) times a day.  KRILL OIL PO Take 300 mg by mouth daily.  Cholecalciferol, Vitamin D3, (VITAMIN D) 2,000 unit Cap Take 1 Tab by mouth daily.  MULTIVITAMIN PO Take 1 Tab by mouth daily.  chlorpheniramine-HYDROcodone (TUSSIONEX) 10-8 mg/5 mL suspension Take 5 mL by mouth every twelve (12) hours as needed for Cough or Congestion.  180 mL 0       Allergies   Allergen Reactions    Iodinated Contrast Media - Oral And Iv Dye Swelling    Clindamycin Nausea and Vomiting    Codeine Other (comments)     Headaches      Morphine Other (comments)     headache         Past Medical History:   Diagnosis Date    Anxiety     Arrhythmia     A-Fib    Depression     History of hepatitis B     Hypercholesterolemia     Hypertension     Renal artery stenosis ([facility] [address] 2006         Past Surgical History:   Procedure Laterality Date    CARDIAC SURG PROCEDURE UNLIST  4/28/2015    cardiac ablation     HX BLEPHAROPLASTY  04/26/2016    HX HYSTERECTOMY  1988    MS COLONOSCOPY FLX DX W/COLLJ SPEC WHEN PFRMD  12/01/2010    dr Pa Daly         Family History   Problem Relation Age of Onset    Cancer Mother     Cancer Father      lung    Heart Disease Brother     Heart Attack Brother     Lung Disease Brother      COPD    No Known Problems Sister     Hypertension Brother        Social History   Substance Use Topics    Smoking status: Never Smoker    Smokeless tobacco: Never Used    Alcohol use 0.0 oz/week     0 Standard drinks or equivalent per week      Comment: socially        Lab Results   Component Value Date/Time    WBC 5.9 10/26/2016 07:50 AM    HGB 13.7 10/26/2016 07:50 AM    HCT 40.9 10/26/2016 07:50 AM    PLATELET 528 43/35/9962 07:50 AM    MCV 93 10/26/2016 07:50 AM       Lab Results   Component Value Date/Time    Hemoglobin A1c 5.7 03/27/2013 01:20 PM    Hemoglobin A1c 6.0 02/11/2012 11:34 AM    Hemoglobin A1c 6.0 10/22/2011 09:00 AM    Glucose 130 03/07/2017 02:35 PM    Glucose  03/07/2017 02:35 PM    LDL, calculated 152 10/26/2016 07:50 AM    Creatinine 1.14 03/07/2017 02:35 PM      Lab Results   Component Value Date/Time    Cholesterol, total 240 10/26/2016 07:50 AM    HDL Cholesterol 54 10/26/2016 07:50 AM    LDL, calculated 152 10/26/2016 07:50 AM    Triglyceride 168 10/26/2016 07:50 AM       Lab Results   Component Value Date/Time    TSH 3.880 10/26/2016 07:50 AM    T4, Free 1.14 01/28/2015 02:43 PM      Lab Results   Component Value Date/Time    Sodium 144 03/07/2017 02:35 PM    Potassium 4.4 03/07/2017 02:35 PM    Chloride 100 03/07/2017 02:35 PM    CO2 25 03/07/2017 02:35 PM    Glucose 130 03/07/2017 02:35 PM    BUN 17 03/07/2017 02:35 PM    Creatinine 1.14 03/07/2017 02:35 PM    BUN/Creatinine ratio 15 03/07/2017 02:35 PM    GFR est AA 58 03/07/2017 02:35 PM    GFR est non-AA 51 03/07/2017 02:35 PM    Calcium 9.5 03/07/2017 02:35 PM    Bilirubin, total 0.3 01/20/2016 10:07 AM    ALT (SGPT) 20 01/20/2016 10:07 AM    AST (SGOT) 24 01/20/2016 10:07 AM    Alk. phosphatase 81 01/20/2016 10:07 AM    Protein, total 6.4 01/20/2016 10:07 AM    Albumin 4.0 01/20/2016 10:07 AM    A-G Ratio 1.7 01/20/2016 10:07 AM      Lab Results   Component Value Date/Time    Hemoglobin A1c 5.7 03/27/2013 01:20 PM    Hemoglobin A1c (POC) 5.9 03/07/2017 02:35 PM         Review of Systems   Constitutional: Negative for malaise/fatigue. HENT: Positive for congestion. Eyes: Negative for blurred vision. Respiratory: Positive for cough and sputum production. Negative for shortness of breath. Cardiovascular: Negative for chest pain, palpitations and leg swelling. Gastrointestinal: Negative for abdominal pain, constipation and heartburn. Genitourinary: Negative for dysuria, frequency and urgency. Musculoskeletal: Negative for back pain and joint pain. Neurological: Negative for dizziness, tingling and headaches. Endo/Heme/Allergies: Positive for environmental allergies. Psychiatric/Behavioral: Negative for depression. The patient does not have insomnia. Physical Exam   Constitutional: She appears well-developed and well-nourished. Visit Vitals    /69 (BP 1 Location: Left arm, BP Patient Position: Sitting)    Pulse 70    Temp 97.1 °F (36.2 °C) (Oral)    Resp 16    Ht 5' 8\" (1.727 m)    Wt 265 lb 12.8 oz (120.6 kg)    SpO2 97%     L/min    BMI 40.41 kg/m2          HENT:   Right Ear: Tympanic membrane and ear canal normal.   Left Ear: Tympanic membrane and ear canal normal.   Nose: Mucosal edema and rhinorrhea present. Mouth/Throat: Mucous membranes are normal. Posterior oropharyngeal erythema present. No oropharyngeal exudate. Neck: Trachea normal, normal range of motion and full passive range of motion without pain. Neck supple. No edema present. No thyromegaly present. Cardiovascular: Normal rate and regular rhythm. No murmur heard. Pulmonary/Chest: Effort normal and breath sounds normal. She has wheezes and rhonchi. She has no rales. Abdominal: Soft. Normal appearance and bowel sounds are normal. There is no tenderness. Musculoskeletal: Normal range of motion. She exhibits no edema. Lymphadenopathy:     She has no cervical adenopathy. Skin: Skin is warm and dry. Psychiatric: She has a normal mood and affect. Nursing note and vitals reviewed. ASSESSMENT and PLAN  Bob Rdz was seen today for cough. Diagnoses and all orders for this visit:    Acute bronchitis with bronchospasm  -     XR CHEST PA LAT; Future  No acute infiltrate.    -     levoFLOXacin (LEVAQUIN) 500 mg tablet; Take 1 Tab by mouth daily for 7 days. -     predniSONE (DELTASONE) 10 mg tablet; Taper as directed  -     albuterol (PROVENTIL VENTOLIN) 2.5 mg /3 mL (0.083 %) nebulizer solution; 3 mL by Nebulization route every four (4) hours as needed for Wheezing. Follow-up Disposition: reviewed medications and side effects in detail    I have discussed diagnosis listed in this note with pt and/or family. I have discussed treatment plans and options and the risk/benefit analysis of those options, including safe use of medications and possible medication side effects. Through the use of shared decision making we have agreed to the above plan. The patient has received an after-visit summary and questions were answered concerning future plans and follow up. Advise pt of any urgent changes then to proceed to the ER.

## 2017-05-26 NOTE — MR AVS SNAPSHOT
Visit Information Date & Time Provider Department Dept. Phone Encounter #  
 5/26/2017 12:15 PM Sonia Kim MD [facility] [phone] [id number] Your Appointments 9/5/2017  8:15 AM  
COMPLETE PHYSICAL with Sonia Kim MD  
[facility] [address]) Appt Note: Medicare Wellness; cpe per Dr ISRAEL/S  
 [address] [facility] [place] [id number] [phone] [address] [place] [address] [address] Upcoming Health Maintenance Date Due Pneumococcal 65+ Low/Medium Risk (1 of 2 - PCV13) 8/31/2016 MEDICARE YEARLY EXAM 8/31/2016 INFLUENZA AGE 9 TO ADULT 8/1/2017 GLAUCOMA SCREENING Q2Y 3/14/2018 BREAST CANCER SCRN MAMMOGRAM 4/8/2018 COLONOSCOPY 5/20/2021 DTaP/Tdap/Td series (2 - Td) 3/19/2024 Allergies as of 5/26/2017  Review Complete On: 5/26/2017 By: Garfield Layne LPN Severity Noted Reaction Type Reactions Iodinated Contrast Media - Oral And Iv Dye High 03/27/2013   Systemic Swelling Clindamycin  06/27/2011    Nausea and Vomiting Codeine  06/27/2011    Other (comments) Headaches Morphine  06/27/2011    Other (comments)  
 headache Current Immunizations  Reviewed on 3/7/2017 Name Date Influenza Vaccine 10/17/2016, 10/12/2015 Influenza Vaccine Split 9/24/2012 Tdap 3/19/2014 Zoster Vaccine, Live 6/1/2014 Not reviewed this visit You Were Diagnosed With   
  
 Codes Comments Acute bronchitis with bronchospasm    -  Primary ICD-10-CM: J20.9 ICD-9-CM: 466.0 Vitals BP Pulse Temp Resp Height(growth percentile) Weight(growth percentile) 140/69 (BP 1 Location: Left arm, BP Patient Position: Sitting) 70 97.1 °F (36.2 °C) (Oral) 16 5' 8\" (1.727 m) 265 lb 12.8 oz (120.6 kg) SpO2 PF BMI OB Status Smoking Status 97% 270 L/min 40.41 kg/m2 Hysterectomy Never Smoker BMI and BSA Data Body Mass Index Body Surface Area 40.41 kg/m 2 2.41 m 2 Preferred Pharmacy Pharmacy Name Phone [facility] [address] [facility] [phone] Your Updated Medication List  
  
   
This list is accurate as of: 5/26/17  1:31 PM.  Always use your most recent med list.  
  
  
  
  
 albuterol 90 mcg/actuation inhaler Commonly known as:  PROVENTIL HFA, VENTOLIN HFA, PROAIR HFA Take 1-2 Puffs by inhalation every four (4) hours as needed for Wheezing. buPROPion  mg XL tablet Commonly known as:  [place] Span Take 1 Tab by mouth every morning. chlorpheniramine-HYDROcodone 10-8 mg/5 mL suspension Commonly known as:  Xochilt Min Take 5 mL by mouth every twelve (12) hours as needed for Cough or Congestion. CINNAMON 500 mg Cap Generic drug:  cinnamon bark Take 1,000 mg by mouth two (2) times a day. CLARITIN 10 mg tablet Generic drug:  loratadine Take 10 mg by mouth daily as needed for Allergies. desonide 0.05 % topical ointment Commonly known as:  DESOWEN  
as needed  
  
 escitalopram oxalate 20 mg tablet Commonly known as:  Rubin Blowers Take 1 Tab by mouth daily. estradiol 0.5 mg tablet Commonly known as:  ESTRACE Take 0.5 mg by mouth daily. fluorouracil 5 % chemo cream  
Commonly known as:  EFUDEX  
daily as needed. fluticasone 50 mcg/actuation nasal spray Commonly known as:  [place] 2 Sprays by Both Nostrils route daily as needed for Rhinitis. fluticasone-salmeterol 250-50 mcg/dose diskus inhaler Commonly known as:  ADVAIR Take 1 Puff by inhalation every twelve (12) hours. KRILL OIL PO Take 300 mg by mouth daily. levoFLOXacin 500 mg tablet Commonly known as:  Kay Camps Take 1 Tab by mouth daily for 7 days. losartan 50 mg tablet Commonly known as:  COZAAR Take 1 Tab by mouth daily. metoprolol succinate 100 mg tablet Commonly known as:  TOPROL-XL Take 1 Tab by mouth daily. MULTIVITAMIN PO Take 1 Tab by mouth daily. mupirocin 2 % ointment Commonly known as:  [employer]  
as needed. nystatin topical cream  
Commonly known as:  MYCOSTATIN Apply  to affected area as needed. pantoprazole 40 mg tablet Commonly known as:  PROTONIX Take 1 Tab by mouth daily. predniSONE 10 mg tablet Commonly known as:  Kiya Hardeman Taper as directed  
  
 promethazine-dextromethorphan 6.25-15 mg/5 mL syrup Commonly known as:  PROMETHAZINE-DM Take 5 mL by mouth four (4) times daily as needed for Cough. * triamcinolone acetonide 0.1 % ointment Commonly known as:  KENALOG Apply  to affected area as needed. * triamcinolone acetonide 0.1 % topical cream  
Commonly known as:  KENALOG  
APPLY TO AFFECTED AREA BID FOR 7 DAYS  
  
 VITAMIN D 2,000 unit Cap capsule Generic drug:  Cholecalciferol (Vitamin D3) Take 1 Tab by mouth daily. * Notice: This list has 2 medication(s) that are the same as other medications prescribed for you. Read the directions carefully, and ask your doctor or other care provider to review them with you. Prescriptions Sent to Pharmacy Refills  
 levoFLOXacin (LEVAQUIN) 500 mg tablet 0 Sig: Take 1 Tab by mouth daily for 7 days. Class: Normal  
 Pharmacy: [facility] [address] Ph #: [phone] Route: Oral  
 predniSONE (DELTASONE) 10 mg tablet 1 Sig: Taper as directed Class: Normal  
 Pharmacy: [facility] [address] Ph #: [phone] To-Do List   
 05/26/2017 Imaging:  XR CHEST PA LAT Introducing [facility]! Dear Marlow Babinski: 
Thank you for requesting a YOOSE account.   Our records indicate that you already have an active Glofox account. You can access your account anytime at [email or web address] Did you know that you can access your hospital and ER discharge instructions at any time in Glofox? You can also review all of your test results from your hospital stay or ER visit. Additional Information If you have questions, please visit the Frequently Asked Questions section of the Glofox website at [email or web address] Remember, Glofox is NOT to be used for urgent needs. For medical emergencies, dial 911. Now available from your iPhone and Android! Please provide this summary of care documentation to your next provider. Your primary care clinician is listed as Fanta Vazquez. If you have any questions after today's visit, please call [phone].

## 2017-05-26 NOTE — PROGRESS NOTES
Chief Complaint   Patient presents with    Cough     pt c/o coughing up thick white sputum at times     Pt received Decadron 10mg IM in left deltoid without any difficulty.

## 2017-05-27 DIAGNOSIS — I47.1 PAROXYSMAL SVT (SUPRAVENTRICULAR TACHYCARDIA) (HCC): ICD-10-CM

## 2017-05-30 RX ORDER — METOPROLOL SUCCINATE 100 MG/1
100 TABLET, EXTENDED RELEASE ORAL DAILY
Qty: 90 TAB | Refills: 3 | Status: SHIPPED | OUTPATIENT
Start: 2017-05-30 | End: 2017-05-31 | Stop reason: SDUPTHER

## 2017-05-30 NOTE — TELEPHONE ENCOUNTER
From: Fabienne Her  To: Kraig Mcdonnell MD  Sent: 5/27/2017 9:44 AM EDT  Subject: Medication Renewal Request    Original authorizing provider: MD Fabienne Cornell Do would like a refill of the following medications:  metoprolol succinate (TOPROL-XL) 100 mg XL tablet Kraig Mcdonnell MD]    Preferred pharmacy: Other [facility] home delivery    Comment:

## 2017-05-31 DIAGNOSIS — I47.1 PAROXYSMAL SVT (SUPRAVENTRICULAR TACHYCARDIA) (HCC): ICD-10-CM

## 2017-05-31 RX ORDER — METOPROLOL SUCCINATE 100 MG/1
100 TABLET, EXTENDED RELEASE ORAL DAILY
Qty: 90 TAB | Refills: 3 | Status: SHIPPED | OUTPATIENT
Start: 2017-05-31 | End: 2017-06-01 | Stop reason: SDUPTHER

## 2017-06-01 DIAGNOSIS — I47.1 PAROXYSMAL SVT (SUPRAVENTRICULAR TACHYCARDIA) (HCC): ICD-10-CM

## 2017-06-02 RX ORDER — METOPROLOL SUCCINATE 100 MG/1
100 TABLET, EXTENDED RELEASE ORAL DAILY
Qty: 90 TAB | Refills: 3 | Status: SHIPPED | OUTPATIENT
Start: 2017-06-02 | End: 2018-04-23 | Stop reason: SDUPTHER

## 2017-06-06 PROBLEM — J30.89 ENVIRONMENTAL AND SEASONAL ALLERGIES: Status: ACTIVE | Noted: 2017-06-06

## 2017-06-06 PROBLEM — J45.909 ASTHMATIC BRONCHITIS: Status: ACTIVE | Noted: 2017-06-06

## 2017-08-23 ENCOUNTER — OFFICE VISIT (OUTPATIENT)
Dept: FAMILY MEDICINE CLINIC | Age: 67
End: 2017-08-23

## 2017-08-23 VITALS
SYSTOLIC BLOOD PRESSURE: 118 MMHG | HEIGHT: 68 IN | HEART RATE: 88 BPM | BODY MASS INDEX: 39.4 KG/M2 | OXYGEN SATURATION: 98 % | WEIGHT: 260 LBS | DIASTOLIC BLOOD PRESSURE: 57 MMHG | TEMPERATURE: 97.3 F | RESPIRATION RATE: 20 BRPM

## 2017-08-23 DIAGNOSIS — J20.9 ACUTE BRONCHITIS WITH BRONCHOSPASM: ICD-10-CM

## 2017-08-23 DIAGNOSIS — J45.21 ASTHMATIC BRONCHITIS, MILD INTERMITTENT, WITH ACUTE EXACERBATION: Primary | ICD-10-CM

## 2017-08-23 RX ORDER — PREDNISONE 10 MG/1
TABLET ORAL
Qty: 50 TAB | Refills: 1 | Status: SHIPPED | OUTPATIENT
Start: 2017-08-23 | End: 2017-09-05 | Stop reason: ALTCHOICE

## 2017-08-23 RX ORDER — LEVOFLOXACIN 500 MG/1
500 TABLET, FILM COATED ORAL DAILY
Qty: 7 TAB | Refills: 0 | Status: SHIPPED | OUTPATIENT
Start: 2017-08-23 | End: 2017-09-05 | Stop reason: SDUPTHER

## 2017-08-23 NOTE — MR AVS SNAPSHOT
Visit Information Date & Time Provider Department Dept. Phone Encounter #  
 8/23/2017  3:30 PM Talat Matos MD [facility] [phone] [id number] Your Appointments 9/5/2017  8:15 AM  
COMPLETE PHYSICAL with Talat Matos MD  
[facility] [address]) Appt Note: Medicare Wellness; cpe per  J/S  
 [address] [facility] [phone] [phone] [address] [place] [address] [address] Upcoming Health Maintenance Date Due Pneumococcal 65+ Low/Medium Risk (1 of 2 - PCV13) 8/31/2016 MEDICARE YEARLY EXAM 8/31/2016 INFLUENZA AGE 9 TO ADULT 10/7/2017* GLAUCOMA SCREENING Q2Y 3/14/2018 BREAST CANCER SCRN MAMMOGRAM 4/8/2018 COLONOSCOPY 5/20/2021 DTaP/Tdap/Td series (2 - Td) 3/19/2024 *Topic was postponed. The date shown is not the original due date. Allergies as of 8/23/2017  Review Complete On: 8/23/2017 By: Talat Matos MD  
  
 Severity Noted Reaction Type Reactions Iodinated Contrast- Oral And Iv Dye High 03/27/2013   Systemic Swelling Clindamycin  06/27/2011    Nausea and Vomiting Codeine  06/27/2011    Other (comments) Headaches Morphine  06/27/2011    Other (comments)  
 headache Current Immunizations  Reviewed on 3/7/2017 Name Date Influenza Vaccine 10/17/2016, 10/12/2015 Influenza Vaccine Split 9/24/2012 Tdap 3/19/2014 Zoster Vaccine, Live 6/1/2014 Not reviewed this visit You Were Diagnosed With   
  
 Codes Comments Asthmatic bronchitis, mild intermittent, with acute exacerbation    -  Primary ICD-10-CM: J45.21 ICD-9-CM: 579.43 Acute bronchitis with bronchospasm     ICD-10-CM: J20.9 ICD-9-CM: 466.0 Vitals BP Pulse Temp Resp Height(growth percentile) Weight(growth percentile) 118/57 88 97.3 °F (36.3 °C) (Oral) 20 5' 8\" (1.727 m) 260 lb (117.9 kg) SpO2 BMI OB Status Smoking Status 98% 39.53 kg/m2 Hysterectomy Never Smoker Vitals History BMI and BSA Data Body Mass Index Body Surface Area  
 39.53 kg/m 2 2.38 m 2 Preferred Pharmacy Pharmacy Name Phone [facility] [address] [facility] [place]cia [phone] Your Updated Medication List  
  
   
This list is accurate as of: 8/23/17  4:48 PM.  Always use your most recent med list.  
  
  
  
  
 * albuterol 90 mcg/actuation inhaler Commonly known as:  PROVENTIL HFA, VENTOLIN HFA, PROAIR HFA Take 1-2 Puffs by inhalation every four (4) hours as needed for Wheezing. * albuterol 2.5 mg /3 mL (0.083 %) nebulizer solution Commonly known as:  PROVENTIL VENTOLIN  
3 mL by Nebulization route every four (4) hours as needed for Wheezing. buPROPion  mg XL tablet Commonly known as:  Roosvelt Blazing Take 1 Tab by mouth every morning. chlorpheniramine-HYDROcodone 10-8 mg/5 mL suspension Commonly known as:  Achilles Bev Take 5 mL by mouth every twelve (12) hours as needed for Cough or Congestion. CINNAMON 500 mg Cap Generic drug:  cinnamon bark Take 1,000 mg by mouth two (2) times a day. CLARITIN 10 mg tablet Generic drug:  loratadine Take 10 mg by mouth daily as needed for Allergies. desonide 0.05 % topical ointment Commonly known as:  DESOWEN  
as needed  
  
 escitalopram oxalate 20 mg tablet Commonly known as:  Haim Pulley Take 1 Tab by mouth daily. estradiol 0.5 mg tablet Commonly known as:  ESTRACE Take 0.5 mg by mouth daily. fluorouracil 5 % chemo cream  
Commonly known as:  EFUDEX  
daily as needed. fluticasone 50 mcg/actuation nasal spray Commonly known as:  [place] 2 Sprays by Both Nostrils route daily as needed for Rhinitis. fluticasone-salmeterol 250-50 mcg/dose diskus inhaler Commonly known as:  ADVAIR  
 Take 1 Puff by inhalation every twelve (12) hours. KRILL OIL PO Take 300 mg by mouth daily. levoFLOXacin 500 mg tablet Commonly known as:  Shannon Bald Take 1 Tab by mouth daily for 7 days. losartan 50 mg tablet Commonly known as:  COZAAR Take 1 Tab by mouth daily. metoprolol succinate 100 mg tablet Commonly known as:  TOPROL-XL Take 1 Tab by mouth daily. MULTIVITAMIN PO Take 1 Tab by mouth daily. mupirocin 2 % ointment Commonly known as:  Fer Heladio  
as needed. nystatin topical cream  
Commonly known as:  MYCOSTATIN Apply  to affected area as needed. pantoprazole 40 mg tablet Commonly known as:  PROTONIX Take 1 Tab by mouth daily. predniSONE 10 mg tablet Commonly known as:  Graydon Markell Taper as directed. * triamcinolone acetonide 0.1 % ointment Commonly known as:  KENALOG Apply  to affected area as needed. * triamcinolone acetonide 0.1 % topical cream  
Commonly known as:  KENALOG  
APPLY TO AFFECTED AREA BID FOR 7 DAYS  
  
 VITAMIN D 2,000 unit Cap capsule Generic drug:  Cholecalciferol (Vitamin D3) Take 1 Tab by mouth daily. * Notice: This list has 4 medication(s) that are the same as other medications prescribed for you. Read the directions carefully, and ask your doctor or other care provider to review them with you. Prescriptions Sent to Pharmacy Refills  
 predniSONE (DELTASONE) 10 mg tablet 1 Sig: Taper as directed. Class: Normal  
 Pharmacy: [facility] [address] Ph #: [phone]  
 levoFLOXacin (LEVAQUIN) 500 mg tablet 0 Sig: Take 1 Tab by mouth daily for 7 days. Class: Normal  
 Pharmacy: [facility] [address], [address] Ph #: [phone] Route: Oral  
  
We Performed the Following AMB POC EKG ROUTINE W/ 12 LEADS, INTER & REP [18528 CPT(R)] To-Do List   
 08/23/2017 Imaging:  XR CHEST PA LAT Introducing [facility]! Dear Christianna Carrel: 
Thank you for requesting a Color Eight account. Our records indicate that you already have an active [employer] Eight account. You can access your account anytime at [email or web address] Did you know that you can access your hospital and ER discharge instructions at any time in [employer] Eight? You can also review all of your test results from your hospital stay or ER visit. Additional Information If you have questions, please visit the Frequently Asked Questions section of the [employer] website at [email or web address] Remember, [employer] is NOT to be used for urgent needs. For medical emergencies, dial 911. Now available from your iPhone and Android! Please provide this summary of care documentation to your next provider. Your primary care clinician is listed as Andrew Horne. If you have any questions after today's visit, please call [phone].

## 2017-08-23 NOTE — PROGRESS NOTES
HISTORY OF PRESENT ILLNESS  Gelacio Gan is a 73 y.o. female. HPI   C/o chest congestion and cough for the past several days. Started after getting Prevnar 13 vaccine on last Thursday. Coughing up phlegm. Has chills and sweats today. Throat is scratchy. Has post nasal drainage. No chest pain or SOB but hurts in the mid upper back. Has had some slight wheezing noted. Has been complaint with Advair and albuterol inhaler. Patient Active Problem List   Diagnosis Code    Depression F32.9    Anxiety F41.9    Hypercholesterolemia E78.00    Renal artery stenosis (HCC) I70.1    History of hepatitis B Z86.19    Glucose intolerance (impaired glucose tolerance) R73.02    Essential hypertension, benign I10    Encounter for medication monitoring Z51.81    H/O atrial fibrillation without current medication Z86.79    Asthmatic bronchitis J45.909    Environmental and seasonal allergies J30.89       Current Outpatient Prescriptions   Medication Sig Dispense Refill    predniSONE (DELTASONE) 10 mg tablet Taper as directed. 50 Tab 1    levoFLOXacin (LEVAQUIN) 500 mg tablet Take 1 Tab by mouth daily for 7 days. 7 Tab 0    metoprolol succinate (TOPROL-XL) 100 mg tablet Take 1 Tab by mouth daily. 90 Tab 3    chlorpheniramine-HYDROcodone (TUSSIONEX) 10-8 mg/5 mL suspension Take 5 mL by mouth every twelve (12) hours as needed for Cough or Congestion. 180 mL 0    triamcinolone acetonide (KENALOG) 0.1 % topical cream APPLY TO AFFECTED AREA BID FOR 7 DAYS  1    albuterol (PROVENTIL VENTOLIN) 2.5 mg /3 mL (0.083 %) nebulizer solution 3 mL by Nebulization route every four (4) hours as needed for Wheezing. 24 Each 11    desonide (DESOWEN) 0.05 % topical ointment as needed  2    fluticasone-salmeterol (ADVAIR) 250-50 mcg/dose diskus inhaler Take 1 Puff by inhalation every twelve (12) hours. 1 Each 3    losartan (COZAAR) 50 mg tablet Take 1 Tab by mouth daily.  30 Tab 3    pantoprazole (PROTONIX) 40 mg tablet Take 1 Tab by mouth daily. 30 Tab 3    escitalopram oxalate (LEXAPRO) 20 mg tablet Take 1 Tab by mouth daily. 30 Tab 3    buPROPion XL (WELLBUTRIN XL) 300 mg XL tablet Take 1 Tab by mouth every morning. 30 Tab 3    estradiol (ESTRACE) 0.5 mg tablet Take 0.5 mg by mouth daily.  fluorouracil (EFUDEX) 5 % chemo cream daily as needed. 0    mupirocin (BACTROBAN) 2 % ointment as needed. 11    nystatin (MYCOSTATIN) topical cream Apply  to affected area as needed. 11    triamcinolone acetonide (KENALOG) 0.1 % ointment Apply  to affected area as needed. 11    loratadine (CLARITIN) 10 mg tablet Take 10 mg by mouth daily as needed for Allergies.  fluticasone (FLONASE) 50 mcg/actuation nasal spray 2 Sprays by Both Nostrils route daily as needed for Rhinitis. 3 Bottle 3    cinnamon bark (CINNAMON) 500 mg cap Take 1,000 mg by mouth two (2) times a day.  KRILL OIL PO Take 300 mg by mouth daily.  Cholecalciferol, Vitamin D3, (VITAMIN D) 2,000 unit Cap Take 1 Tab by mouth daily.  MULTIVITAMIN PO Take 1 Tab by mouth daily.  albuterol (PROVENTIL HFA, VENTOLIN HFA, PROAIR HFA) 90 mcg/actuation inhaler Take 1-2 Puffs by inhalation every four (4) hours as needed for Wheezing.  1 Inhaler 11       Allergies   Allergen Reactions    Iodinated Contrast- Oral And Iv Dye Swelling    Clindamycin Nausea and Vomiting    Codeine Other (comments)     Headaches      Morphine Other (comments)     headache         Past Medical History:   Diagnosis Date    Anxiety     Arrhythmia     A-Fib    Depression     History of hepatitis B     Hypercholesterolemia     Hypertension     Renal artery stenosis ([facility] [address] 2006         Past Surgical History:   Procedure Laterality Date    CARDIAC SURG PROCEDURE UNLIST  4/28/2015    cardiac ablation     HX BLEPHAROPLASTY  04/26/2016    HX HYSTERECTOMY  1988    NE COLONOSCOPY FLX DX W/COLLJ SPEC WHEN PFRMD  12/01/2010    dr Dionisio Lima         Family History   Problem Relation Age of Onset    Cancer Mother     Cancer Father      lung    Heart Disease Brother     Heart Attack Brother     Lung Disease Brother      COPD    No Known Problems Sister     Hypertension Brother        Social History   Substance Use Topics    Smoking status: Never Smoker    Smokeless tobacco: Never Used    Alcohol use 0.0 oz/week     0 Standard drinks or equivalent per week      Comment: socially        Lab Results   Component Value Date/Time    WBC 5.9 10/26/2016 07:50 AM    HGB 13.7 10/26/2016 07:50 AM    HCT 40.9 10/26/2016 07:50 AM    PLATELET 933 62/23/6397 07:50 AM    MCV 93 10/26/2016 07:50 AM       Lab Results   Component Value Date/Time    Hemoglobin A1c 5.7 03/27/2013 01:20 PM    Hemoglobin A1c 6.0 02/11/2012 11:34 AM    Hemoglobin A1c 6.0 10/22/2011 09:00 AM    Glucose 130 03/07/2017 02:35 PM    Glucose  03/07/2017 02:35 PM    LDL, calculated 152 10/26/2016 07:50 AM    Creatinine 1.14 03/07/2017 02:35 PM      Lab Results   Component Value Date/Time    Cholesterol, total 240 10/26/2016 07:50 AM    HDL Cholesterol 54 10/26/2016 07:50 AM    LDL, calculated 152 10/26/2016 07:50 AM    Triglyceride 168 10/26/2016 07:50 AM       Lab Results   Component Value Date/Time    TSH 3.880 10/26/2016 07:50 AM    T4, Free 1.14 01/28/2015 02:43 PM      Lab Results   Component Value Date/Time    Sodium 144 03/07/2017 02:35 PM    Potassium 4.4 03/07/2017 02:35 PM    Chloride 100 03/07/2017 02:35 PM    CO2 25 03/07/2017 02:35 PM    Glucose 130 03/07/2017 02:35 PM    BUN 17 03/07/2017 02:35 PM    Creatinine 1.14 03/07/2017 02:35 PM    BUN/Creatinine ratio 15 03/07/2017 02:35 PM    GFR est AA 58 03/07/2017 02:35 PM    GFR est non-AA 51 03/07/2017 02:35 PM    Calcium 9.5 03/07/2017 02:35 PM    Bilirubin, total 0.3 01/20/2016 10:07 AM    ALT (SGPT) 20 01/20/2016 10:07 AM    AST (SGOT) 24 01/20/2016 10:07 AM    Alk.  phosphatase 81 01/20/2016 10:07 AM    Protein, total 6.4 01/20/2016 10:07 AM    Albumin 4.0 01/20/2016 10:07 AM    A-G Ratio 1.7 01/20/2016 10:07 AM      Lab Results   Component Value Date/Time    Hemoglobin A1c 5.7 03/27/2013 01:20 PM    Hemoglobin A1c (POC) 5.9 03/07/2017 02:35 PM         Review of Systems   Constitutional: Negative for malaise/fatigue. HENT: Positive for congestion. Eyes: Negative for blurred vision. Respiratory: Positive for cough and sputum production. Negative for shortness of breath. Cardiovascular: Negative for chest pain, palpitations and leg swelling. Gastrointestinal: Negative for abdominal pain, constipation and heartburn. Genitourinary: Negative for dysuria, frequency and urgency. Musculoskeletal: Negative for back pain and joint pain. Neurological: Negative for dizziness, tingling and headaches. Endo/Heme/Allergies: Positive for environmental allergies. Psychiatric/Behavioral: Negative for depression. The patient does not have insomnia. Physical Exam   Constitutional: She appears well-developed and well-nourished. /57  Pulse 88  Temp 97.3 °F (36.3 °C) (Oral)   Resp 20  Ht 5' 8\" (1.727 m)  Wt 260 lb (117.9 kg)  SpO2 98%  BMI 39.53 kg/m2  HENT:   Right Ear: Tympanic membrane and ear canal normal.   Left Ear: Tympanic membrane and ear canal normal.   Nose: Mucosal edema and rhinorrhea present. Mouth/Throat: Mucous membranes are normal. Posterior oropharyngeal erythema present. No oropharyngeal exudate. Neck: Trachea normal, normal range of motion and full passive range of motion without pain. Neck supple. No edema present. No thyromegaly present. Cardiovascular: Normal rate and regular rhythm. No murmur heard. Pulmonary/Chest: Effort normal and breath sounds normal.  She has slight wheezes. She has no rales. Abdominal: Soft. Normal appearance and bowel sounds are normal. There is no tenderness. Musculoskeletal: Normal range of motion. She exhibits no edema. Nursing note and vitals reviewed.       ASSESSMENT and PLAN  Diagnoses and all orders for this visit:    1. Asthmatic bronchitis, mild intermittent, with acute exacerbation  -     XR CHEST PA LAT; Future  -     AMB POC EKG ROUTINE W/ 12 LEADS, INTER & REP  -     levoFLOXacin (LEVAQUIN) 500 mg tablet; Take 1 Tab by mouth daily for 7 days. -     predniSONE (DELTASONE) 10 mg tablet; Taper as directed. Follow-up Disposition:   Reviewed medications and side effects in detail. I have discussed diagnosis listed in this note with pt and/or family. I have discussed treatment plans and options and the risk/benefit analysis of those options, including safe use of medications and possible medication side effects. Through the use of shared decision making we have agreed to the above plan. The patient has received an after-visit summary and questions were answered concerning future plans and follow up. Advise pt of any urgent changes then to proceed to the ER.

## 2017-09-05 ENCOUNTER — HOSPITAL ENCOUNTER (OUTPATIENT)
Dept: LAB | Age: 67
Discharge: HOME | End: 2017-09-05
Payer: MEDICARE

## 2017-09-05 ENCOUNTER — OFFICE VISIT (OUTPATIENT)
Dept: FAMILY MEDICINE CLINIC | Age: 67
End: 2017-09-05

## 2017-09-05 VITALS
WEIGHT: 256 LBS | OXYGEN SATURATION: 98 % | SYSTOLIC BLOOD PRESSURE: 135 MMHG | HEIGHT: 68 IN | DIASTOLIC BLOOD PRESSURE: 73 MMHG | BODY MASS INDEX: 38.8 KG/M2 | HEART RATE: 77 BPM | TEMPERATURE: 98 F | RESPIRATION RATE: 20 BRPM

## 2017-09-05 DIAGNOSIS — R73.02 GLUCOSE INTOLERANCE (IMPAIRED GLUCOSE TOLERANCE): ICD-10-CM

## 2017-09-05 DIAGNOSIS — F32.A DEPRESSION, UNSPECIFIED DEPRESSION TYPE: ICD-10-CM

## 2017-09-05 DIAGNOSIS — I10 ESSENTIAL HYPERTENSION, BENIGN: ICD-10-CM

## 2017-09-05 DIAGNOSIS — J98.9 REACTIVE AIRWAY DISEASE THAT IS NOT ASTHMA: ICD-10-CM

## 2017-09-05 DIAGNOSIS — Z00.00 MEDICARE ANNUAL WELLNESS VISIT, SUBSEQUENT: Primary | ICD-10-CM

## 2017-09-05 DIAGNOSIS — Z86.79 H/O ATRIAL FIBRILLATION WITHOUT CURRENT MEDICATION: ICD-10-CM

## 2017-09-05 DIAGNOSIS — E78.00 HYPERCHOLESTEROLEMIA: ICD-10-CM

## 2017-09-05 DIAGNOSIS — Z51.81 ENCOUNTER FOR MEDICATION MONITORING: ICD-10-CM

## 2017-09-05 LAB
BILIRUB UR QL STRIP: NEGATIVE
GLUCOSE POC: 125 MG/DL
GLUCOSE UR-MCNC: NEGATIVE MG/DL
HBA1C MFR BLD HPLC: 6.1 %
KETONES P FAST UR STRIP-MCNC: NEGATIVE MG/DL
PH UR STRIP: 5 [PH] (ref 4.6–8)
PROT UR QL STRIP: NORMAL MG/DL
SP GR UR STRIP: 1.02 (ref 1–1.03)
UA UROBILINOGEN AMB POC: NORMAL (ref 0.2–1)
URINALYSIS CLARITY POC: CLEAR
URINALYSIS COLOR POC: YELLOW
URINE BLOOD POC: NEGATIVE
URINE LEUKOCYTES POC: NEGATIVE
URINE NITRITES POC: NEGATIVE

## 2017-09-05 PROCEDURE — 80061 LIPID PANEL: CPT

## 2017-09-05 PROCEDURE — 36415 COLL VENOUS BLD VENIPUNCTURE: CPT

## 2017-09-05 PROCEDURE — 85027 COMPLETE CBC AUTOMATED: CPT

## 2017-09-05 PROCEDURE — 80053 COMPREHEN METABOLIC PANEL: CPT

## 2017-09-05 RX ORDER — LEVOFLOXACIN 500 MG/1
500 TABLET, FILM COATED ORAL DAILY
Qty: 7 TAB | Refills: 0 | Status: SHIPPED | OUTPATIENT
Start: 2017-09-05 | End: 2017-09-12

## 2017-09-05 NOTE — MR AVS SNAPSHOT
Visit Information Date & Time Provider Department Dept. Phone Encounter #  
 9/5/2017  8:15 AM Lui Morales MD [facility] [phone] [id number] Follow-up Instructions Return in about 6 months (around 3/5/2018). Upcoming Health Maintenance Date Due  
 MEDICARE YEARLY EXAM 8/31/2016 INFLUENZA AGE 9 TO ADULT 10/7/2017* GLAUCOMA SCREENING Q2Y 3/14/2018 BREAST CANCER SCRN MAMMOGRAM 4/8/2018 Pneumococcal 65+ Low/Medium Risk (2 of 2 - PPSV23) 8/17/2018 COLONOSCOPY 5/20/2021 DTaP/Tdap/Td series (2 - Td) 3/19/2024 *Topic was postponed. The date shown is not the original due date. Allergies as of 9/5/2017  Review Complete On: 9/5/2017 By: Lui Morales MD  
  
 Severity Noted Reaction Type Reactions Iodinated Contrast- Oral And Iv Dye High 03/27/2013   Systemic Swelling Clindamycin  06/27/2011    Nausea and Vomiting Codeine  06/27/2011    Other (comments) Headaches Morphine  06/27/2011    Other (comments)  
 headache Current Immunizations  Reviewed on 9/5/2017 Name Date Influenza Vaccine 10/17/2016, 10/12/2015 Influenza Vaccine Split 9/24/2012 Pneumococcal Conjugate (PCV-13) 8/17/2017 Tdap 3/19/2014 Zoster Vaccine, Live 6/1/2014 Reviewed by Lui Morales MD on 9/5/2017 at  8:52 AM  
You Were Diagnosed With   
  
 Codes Comments Medicare annual wellness visit, subsequent    -  Primary ICD-10-CM: Z00.00 ICD-9-CM: V70.0 Essential hypertension, benign     ICD-10-CM: I10 
ICD-9-CM: 401.1 Hypercholesterolemia     ICD-10-CM: E78.00 ICD-9-CM: 272.0 H/O atrial fibrillation without current medication     ICD-10-CM: Z86.79 
ICD-9-CM: V12.59 Glucose intolerance (impaired glucose tolerance)     ICD-10-CM: R73.02 
ICD-9-CM: 790.22 Depression, unspecified depression type     ICD-10-CM: F32.9 ICD-9-CM: 591 Reactive airway disease that is not asthma     ICD-10-CM: R09.89 ICD-9-CM: 786.9 Encounter for medication monitoring     ICD-10-CM: Z51.81 
ICD-9-CM: V58.83 Vitals BP Pulse Temp Resp Height(growth percentile) Weight(growth percentile) 135/73 (BP 1 Location: Left arm, BP Patient Position: Sitting) 77 98 °F (36.7 °C) (Oral) 20 5' 8\" (1.727 m) 256 lb (116.1 kg) SpO2 BMI OB Status Smoking Status 98% 38.92 kg/m2 Hysterectomy Never Smoker Vitals History BMI and BSA Data Body Mass Index Body Surface Area  
 38.92 kg/m 2 2.36 m 2 Preferred Pharmacy Pharmacy Name Phone [facility] [phone] [id number] [phone] Your Updated Medication List  
  
   
This list is accurate as of: 9/5/17  9:31 AM.  Always use your most recent med list.  
  
  
  
  
 * albuterol 90 mcg/actuation inhaler Commonly known as:  PROVENTIL HFA, VENTOLIN HFA, PROAIR HFA Take 1-2 Puffs by inhalation every four (4) hours as needed for Wheezing. * albuterol 2.5 mg /3 mL (0.083 %) nebulizer solution Commonly known as:  PROVENTIL VENTOLIN  
3 mL by Nebulization route every four (4) hours as needed for Wheezing. buPROPion  mg XL tablet Commonly known as:  Logan Mon Take 1 Tab by mouth every morning. chlorpheniramine-HYDROcodone 10-8 mg/5 mL suspension Commonly known as:  Marijane Shady Take 5 mL by mouth every twelve (12) hours as needed for Cough or Congestion. CINNAMON 500 mg Cap Generic drug:  cinnamon bark Take 1,000 mg by mouth two (2) times a day. CLARITIN 10 mg tablet Generic drug:  loratadine Take 10 mg by mouth daily as needed for Allergies. desonide 0.05 % topical ointment Commonly known as:  DESOWEN  
as needed  
  
 escitalopram oxalate 20 mg tablet Commonly known as:  Minor Muscat Take 1 Tab by mouth daily. estradiol 0.5 mg tablet Commonly known as:  ESTRACE Take 0.5 mg by mouth daily. fluorouracil 5 % chemo cream  
Commonly known as:  EFUDEX  
daily as needed. fluticasone 50 mcg/actuation nasal spray Commonly known as:  [place] 2 Sprays by Both Nostrils route daily as needed for Rhinitis. fluticasone-salmeterol 250-50 mcg/dose diskus inhaler Commonly known as:  ADVAIR Take 1 Puff by inhalation every twelve (12) hours. KRILL OIL PO Take 300 mg by mouth daily. levoFLOXacin 500 mg tablet Commonly known as:  Malena Lieu Take 1 Tab by mouth daily for 7 days. losartan 50 mg tablet Commonly known as:  COZAAR Take 1 Tab by mouth daily. metoprolol succinate 100 mg tablet Commonly known as:  TOPROL-XL Take 1 Tab by mouth daily. MULTIVITAMIN PO Take 1 Tab by mouth daily. mupirocin 2 % ointment Commonly known as:  [facility]  
as needed. nystatin topical cream  
Commonly known as:  MYCOSTATIN Apply  to affected area as needed. pantoprazole 40 mg tablet Commonly known as:  PROTONIX Take 1 Tab by mouth daily. * triamcinolone acetonide 0.1 % ointment Commonly known as:  KENALOG Apply  to affected area as needed. * triamcinolone acetonide 0.1 % topical cream  
Commonly known as:  KENALOG  
APPLY TO AFFECTED AREA BID FOR 7 DAYS  
  
 VITAMIN D 2,000 unit Cap capsule Generic drug:  Cholecalciferol (Vitamin D3) Take 1 Tab by mouth daily. * Notice: This list has 4 medication(s) that are the same as other medications prescribed for you. Read the directions carefully, and ask your doctor or other care provider to review them with you. Prescriptions Printed Refills  
 levoFLOXacin (LEVAQUIN) 500 mg tablet 0 Sig: Take 1 Tab by mouth daily for 7 days. Class: Print Route: Oral  
  
We Performed the Following AMB POC GLUCOSE, QUANTITATIVE, BLOOD [99923 CPT(R)] AMB POC HEMOGLOBIN A1C [56267 CPT(R)] AMB POC URINALYSIS DIP STICK AUTO W/ MICRO [89815 CPT(R)] CBC W/O DIFF [28990 CPT(R)] LIPID PANEL [10195 CPT(R)] METABOLIC PANEL, COMPREHENSIVE [66592 CPT(R)] Follow-up Instructions Return in about 6 months (around 3/5/2018). Introducing [facility]! Dear Dada Blood: 
Thank you for requesting a [employer] account. Our records indicate that you already have an active [employer] account. You can access your account anytime at [email or web address] Did you know that you can access your hospital and ER discharge instructions at any time in [employer]? You can also review all of your test results from your hospital stay or ER visit. Additional Information If you have questions, please visit the Frequently Asked Questions section of the [employer] website at [email or web address] Remember, [employer] is NOT to be used for urgent needs. For medical emergencies, dial 911. Now available from your iPhone and Android! Please provide this summary of care documentation to your next provider. Your primary care clinician is listed as Randall Gutierrez. If you have any questions after today's visit, please call [phone].

## 2017-09-05 NOTE — PROGRESS NOTES
Chief Complaint   Patient presents with   Felder Ban Annual Wellness Visit     Medicare     1. Have you been to the ER, urgent care clinic since your last visit? Hospitalized since your last visit? NO    2. Have you seen or consulted any other health care providers outside of the [address] [place] [address] since your last visit? Include any pap smears or colon screening.  NO      Patient stated she had a mammogram this Spring 2017  Dr. Rakel Salmeron and it was normal. Stated she was not due for a Pap this year only an exam and it was normal

## 2017-09-05 NOTE — PROGRESS NOTES
This is a Subsequent Medicare Annual Wellness Visit providing Personalized Prevention Plan Services (PPPS) (Performed 12 months after initial AWV and PPPS )    I have reviewed the patient's medical history in detail and updated the computerized patient record. Cardiovascular Review:  The patient has hypertension, hyperlipidemia and obesity. H/o PAF and SVT. S/p ablation by cardiology on last month. Diet and Lifestyle: generally follows a low fat low cholesterol diet, generally follows a low sodium diet, follows a diabetic diet regularly, exercises regularly, attended class at the [facility] which was helpful. Home BP Monitoring: is not measured at home. Pertinent ROS: taking medications as instructed, no medication side effects noted, no TIA's, no chest pain on exertion, no dyspnea on exertion, no swelling of ankles. Diabetes Mellitus:  She has glucose intolerance. Diabetic ROS - diabetic diet compliance: compliant most of the time, further diabetic ROS: no polyuria or polydipsia, no chest pain, dyspnea or TIA's, no numbness, tingling or pain in extremities, no unusual visual symptoms. Lab review: orders written for new lab studies as appropriate; see orders. Depression Review:  Patient is seen for followup of depression. Treatment includes Wellbutrin, Lexapro. Ongoing symptoms include none, feeling great. She denies depressed mood, insomnia and fatigue. She experiences the following side effects from the treatment: none. Asthma Review:  The patient is being seen for follow up of reactive airway and allergies, not currently in exacerbation. Sx are improved form earlier visit on last month. Symptoms occur: less than 2x month and with respiratory infections. Wheezing when present is described as mild and easily relieved with rescue bronchodilator. Current limitations in activity from asthma: none. Frequency of use of quick-relief meds: prn. Regimen compliance:  The patient reports adherence to asthma action plan and regimen. History     Past Medical History:   Diagnosis Date    Arthritis     osteo - right knee    GERD (gastroesophageal reflux disease)     Goiter     Ill-defined condition     borderline diabetes    Obstructive sleep apnea (adult) (pediatric)     Unspecified sleep apnea     CPAP      Past Surgical History:   Procedure Laterality Date    COLONOSCOPY N/A 1/23/2017    COLONOSCOPY performed by Santos Love MD at [facility], DIAGNOSTIC  2004    repeat 10 yrs     HX CATARACT REMOVAL  6/12    BILATERAL    HX GYN      C - section x 1    HX HEENT  2006    thyroid goiter removed    HX ORTHOPAEDIC  11/13/2013    right knee replacement    HX OTHER SURGICAL      no colon polyps per pt    TN COLONOSCOPY FLX DX W/COLLJ SPEC WHEN PFRMD  3/14/2007    dr. Uche Morrison    TN COLONOSCOPY W/BIOPSY SINGLE/MULTIPLE  1/23/2012    dr Uche Morrison     Current Outpatient Prescriptions   Medication Sig Dispense Refill    fexofenadine-pseudoephedrine (ALLEGRA-D 12 HOUR)  mg Tb12 Take 1 Tab by mouth every twelve (12) hours as needed.  traMADol (ULTRAM) 50 mg tablet TAKE 1 TABLET BY MOUTH EVERY 6 HOURS AS NEEDED FOR PAIN MAX 4/DAY 30 Tab 1    VAGIFEM 10 mcg tab vaginal tablet Insert 10 mcg into vagina two (2) times a week. 12    aspirin delayed-release 81 mg tablet Take 81 mg by mouth daily.  cholecalciferol, vitamin d3, (VITAMIN D) 1,000 unit tablet Take 2,000 Units by mouth daily.  MULTIVITAMIN PO Take 1 Tab by mouth daily.        Allergies   Allergen Reactions    Oxycodone Other (comments)     Hallucinations     Family History   Problem Relation Age of Onset    Hypertension Mother     Asthma Mother     Heart Disease Mother     Hypertension Father     Heart Disease Father     Hypertension Sister     Alcohol abuse Brother     Heart Disease Brother     Hypertension Sister     Hypertension Sister     Hypertension Sister  Diabetes Brother     Hypertension Brother     Cancer Brother 71     stomach    Hypertension Brother     Other Brother      took awhile to wake up after anesthesia/pt states he had surgery on a [de-identified] and then [de-identified] and Wed is when he had trouble waking up   [employer] Son      colon.  No Known Problems Sister     Hypertension Brother     Diabetes Maternal Uncle     Diabetes Maternal Grandmother      Social History   Substance Use Topics    Smoking status: Never Smoker    Smokeless tobacco: Never Used    Alcohol use Yes      Comment: occasional     Patient Active Problem List   Diagnosis Code    GERD (gastroesophageal reflux disease) K21.9    Obstructive sleep apnea (adult) (pediatric) G47.33    Thyroid disease E07.9    Family history of colon cancer Z80.0    OA (osteoarthritis) M19.90    Multinodular goiter E04.2    Osteoarthritis of right knee M17.11    Primary osteoarthritis of both knees M17.0    Prediabetes R73.03    Encounter for medication monitoring Z51.81    IGT (impaired glucose tolerance) R73.02       Depression Risk Factor Screening:     PHQ over the last two weeks 6/20/2017   Little interest or pleasure in doing things Not at all   Feeling down, depressed or hopeless Not at all   Total Score PHQ 2 0     Alcohol Risk Factor Screening: On any occasion in the past three months you have had more than 3 drinks containing alcohol. Functional Ability and Level of Safety:     Hearing Loss   none    Activities of Daily Living   Self-care. Requires assistance with: no ADLs    Fall Risk     Fall Risk Assessment, last 12 mths 6/20/2017   Able to walk? Yes   Fall in past 12 months? No     Functional Ability:   Does the patient exhibit a steady gait? yes    How long did it take the patient to get up and walk from a sitting position? seconds    Is the patient self reliant? (ie can do own laundry, meals, household chores)  yes   Does the patient handle his/her own medications?   yes   Does the patient handle his/her own money? yes   Is the patients home safe (ie good lighting, handrails on stairs and bath, etc.)? yes   Did you notice or did patient express any hearing difficulties? no   Did you notice or did patient express any vision difficulties?  no   Were distance and reading eye charts used? yes     Advance Care Planning:   Patient was offered the opportunity to discuss advance care planning:  yes    Does patient have an Advance Directive:  no   If no, did you provide information on Caring Connections?   yes          Abuse Screen   Patient is not abused    Review of Systems   Constitutional: negative for fatigue and malaise  Eyes: negative for irritation and redness  Ears, nose, mouth, throat, and face: negative for earaches, nasal congestion and hoarseness  Respiratory: negative for cough, sputum or dyspnea on exertion  Cardiovascular: negative for chest pain, chest pressure/discomfort, dyspnea, palpitations, irregular heart beats, fatigue, lower extremity edema  Gastrointestinal: negative for dysphagia, dyspepsia, reflux symptoms, nausea, vomiting, change in bowel habits, melena, constipation and abdominal pain  Genitourinary:negative for frequency, dysuria, nocturia, urinary incontinence Integument/breast: negative for rash and dryness  Hematologic/lymphatic: negative for easy bruising and lymphadenopathy  Musculoskeletal:negative for myalgias, arthralgias, stiff joints, neck pain, back pain and muscle weakness  Neurological: negative for headaches, dizziness, tremor and weakness  Endocrine: negative for diabetic symptoms including polyuria and polydipsia      Physical Examination     Evaluation of Cognitive Function:  Mood/affect:  neutral  Appearance: age appropriate  Family member/caregiver input: NA    Visit Vitals    /73 (BP 1 Location: Left arm, BP Patient Position: Sitting)    Pulse 77    Temp 98 °F (36.7 °C) (Oral)    Resp 20    Ht 5' 8\" (1.727 m)    Wt 256 lb (116.1 kg)    SpO2 98%    BMI 38.92 kg/m2     General:  Alert, cooperative, no distress, appears stated age. Head:  Normocephalic, without obvious abnormality, atraumatic. Ears:  Normal TMs and external ear canals both ears. Nose: Nares normal. Septum midline. Mucosa normal. No drainage or sinus tenderness. Throat: Lips, mucosa, and tongue normal. Teeth and gums normal.   Neck: Supple, symmetrical, trachea midline, no adenopathy, thyroid: no enlargement/tenderness/nodules, no carotid bruit and no JVD. Back:   Symmetric, no curvature. ROM normal. No CVA tenderness. Lungs:   Clear to auscultation bilaterally. Chest wall:  No tenderness or deformity. Heart:  Regular rate and rhythm, S1, S2 normal, no murmur, click, rub or gallop. Breast Exam:  Deferred. Done by GYN   Abdomen:   Soft, non-tender. Bowel sounds normal. No masses,  No organomegaly. Genitalia:  Deferred. Done by GYN   Rectal:  Deferred. Done by GYN. Extremities: Extremities normal, atraumatic, no cyanosis or edema. Pulses: 2+ and symmetric all extremities. Skin: Skin color, texture, turgor normal. No rashes or lesions. Lymph nodes: Cervical, supraclavicular, and axillary nodes normal.   Neurologic: CNII-XII intact. Normal strength, sensation and reflexes throughout. Patient Care Team:  Martine Esposito MD as PCP - Elaine Maza MD as Physician (Sleep Medicine)  Tunde Nixon MD as Consulting Provider (Endocrinology)    Advice/Referrals/Counseling   Education and counseling provided:  Are appropriate based on today's review and evaluation  End-of-Life planning (with patient's consent)      Assessment/Plan       ASSESSMENT and PLAN  Diagnoses and all orders for this visit:    1. Medicare annual wellness visit, subsequent  -     AMB POC URINALYSIS DIP STICK AUTO W/ MICRO    2.  Essential hypertension, benign  Discussed sodium restriction, high k rich diet, maintaining ideal body weight and regular exercise program such as daily walking 30 min perday 4-5 times per week, as physiologic means to achieve blood pressure control.  Medication compliance advised. 3. Hypercholesterolemia  -     LIPID PANEL    4. H/O atrial fibrillation without current medication  In NSR    5. Glucose intolerance (impaired glucose tolerance)  -     AMB POC HEMOGLOBIN A1C  -     AMB POC GLUCOSE, QUANTITATIVE, BLOOD    6. Depression, unspecified depression type  Stable     7. Reactive airway disease that is not asthma  Improved. Continue with inhalers as needed. 8. Encounter for medication monitoring  -     METABOLIC PANEL, COMPREHENSIVE  -     CBC W/O DIFF      Follow-up Disposition:  Return in about 6 months (around 3/5/2018). reviewed diet, exercise and weight control  cardiovascular risk and specific lipid/LDL goals reviewed  reviewed medications and side effects in detail  specific diabetic recommendations: low cholesterol diet, weight control and daily exercise discussed and glycohemoglobin and other lab monitoring discussed      I have discussed diagnosis listed in this note with pt and/or family. I have discussed treatment plans and options and the risk/benefit analysis of those options, including safe use of medications and possible medication side effects. Through the use of shared decision making we have agreed to the above plan. The patient has received an after-visit summary and questions were answered concerning future plans and follow up. Advise pt of any urgent changes then to proceed to the ER.

## 2017-09-05 NOTE — PROGRESS NOTES
Chief Complaint   Patient presents with   Filippo Gonzalez Annual Wellness Visit     Medicare           Verbal order received per Dr. Tucker- obtain UA with micro- IVANB

## 2017-09-06 LAB
ALBUMIN SERPL-MCNC: 4 G/DL (ref 3.6–4.8)
ALBUMIN/GLOB SERPL: 1.4 {RATIO} (ref 1.2–2.2)
ALP SERPL-CCNC: 102 IU/L (ref 39–117)
ALT SERPL-CCNC: 25 IU/L (ref 0–32)
AST SERPL-CCNC: 17 IU/L (ref 0–40)
BILIRUB SERPL-MCNC: 0.3 MG/DL (ref 0–1.2)
BUN SERPL-MCNC: 17 MG/DL (ref 8–27)
BUN/CREAT SERPL: 18 (ref 12–28)
CALCIUM SERPL-MCNC: 9.5 MG/DL (ref 8.7–10.3)
CHLORIDE SERPL-SCNC: 97 MMOL/L (ref 96–106)
CHOLEST SERPL-MCNC: 259 MG/DL (ref 100–199)
CO2 SERPL-SCNC: 24 MMOL/L (ref 18–29)
CREAT SERPL-MCNC: 0.92 MG/DL (ref 0.57–1)
ERYTHROCYTE [DISTWIDTH] IN BLOOD BY AUTOMATED COUNT: 14 % (ref 12.3–15.4)
GLOBULIN SER CALC-MCNC: 2.8 G/DL (ref 1.5–4.5)
GLUCOSE SERPL-MCNC: 125 MG/DL (ref 65–99)
HCT VFR BLD AUTO: 44.2 % (ref 34–46.6)
HDLC SERPL-MCNC: 53 MG/DL
HGB BLD-MCNC: 14.9 G/DL (ref 11.1–15.9)
INTERPRETATION, 910389: NORMAL
LDLC SERPL CALC-MCNC: 167 MG/DL (ref 0–99)
MCH RBC QN AUTO: 31.5 PG (ref 26.6–33)
MCHC RBC AUTO-ENTMCNC: 33.7 G/DL (ref 31.5–35.7)
MCV RBC AUTO: 93 FL (ref 79–97)
PLATELET # BLD AUTO: 236 X10E3/UL (ref 150–379)
POTASSIUM SERPL-SCNC: 4.5 MMOL/L (ref 3.5–5.2)
PROT SERPL-MCNC: 6.8 G/DL (ref 6–8.5)
RBC # BLD AUTO: 4.73 X10E6/UL (ref 3.77–5.28)
SODIUM SERPL-SCNC: 138 MMOL/L (ref 134–144)
TRIGL SERPL-MCNC: 195 MG/DL (ref 0–149)
VLDLC SERPL CALC-MCNC: 39 MG/DL (ref 5–40)
WBC # BLD AUTO: 8.8 X10E3/UL (ref 3.4–10.8)

## 2017-10-05 ENCOUNTER — HOSPITAL ENCOUNTER (EMERGENCY)
Age: 67
Discharge: HOME | End: 2017-10-05
Attending: EMERGENCY MEDICINE
Payer: OTHER MISCELLANEOUS

## 2017-10-05 ENCOUNTER — APPOINTMENT (OUTPATIENT)
Dept: GENERAL RADIOLOGY | Age: 67
End: 2017-10-05
Attending: EMERGENCY MEDICINE
Payer: OTHER MISCELLANEOUS

## 2017-10-05 VITALS
OXYGEN SATURATION: 99 % | WEIGHT: 260 LBS | RESPIRATION RATE: 18 BRPM | SYSTOLIC BLOOD PRESSURE: 149 MMHG | HEART RATE: 65 BPM | DIASTOLIC BLOOD PRESSURE: 83 MMHG | TEMPERATURE: 98 F | BODY MASS INDEX: 39.4 KG/M2 | HEIGHT: 68 IN

## 2017-10-05 DIAGNOSIS — S63.502A WRIST SPRAIN, LEFT, INITIAL ENCOUNTER: Primary | ICD-10-CM

## 2017-10-05 DIAGNOSIS — M25.562 ACUTE PAIN OF LEFT KNEE: ICD-10-CM

## 2017-10-05 PROCEDURE — 74011250637 HC RX REV CODE- 250/637: Performed by: EMERGENCY MEDICINE

## 2017-10-05 PROCEDURE — 73562 X-RAY EXAM OF KNEE 3: CPT

## 2017-10-05 PROCEDURE — L3809 WHFO W/O JOINTS PRE OTS: HCPCS

## 2017-10-05 PROCEDURE — 99283 EMERGENCY DEPT VISIT LOW MDM: CPT

## 2017-10-05 PROCEDURE — 73110 X-RAY EXAM OF WRIST: CPT

## 2017-10-05 RX ORDER — IBUPROFEN 600 MG/1
600 TABLET ORAL
Status: COMPLETED | OUTPATIENT
Start: 2017-10-05 | End: 2017-10-05

## 2017-10-05 RX ADMIN — IBUPROFEN 600 MG: 600 TABLET, FILM COATED ORAL at 18:41

## 2017-10-05 NOTE — ED TRIAGE NOTES
Pt arrives from work c/o GLF. Pt reports falling on her LEFT wrist and knee. Pt arrives with ice to LEFT wrist.  No deformity noted to wrist.  Some swelling to LEFT knee.

## 2017-10-05 NOTE — DISCHARGE INSTRUCTIONS
Knee Pain or Injury: Care Instructions  Your Care Instructions    Injuries are a common cause of knee problems. Sudden (acute) injuries may be caused by a direct blow to the knee. They can also be caused by abnormal twisting, bending, or falling on the knee. Pain, bruising, or swelling may be severe, and may start within minutes of the injury. Overuse is another cause of knee pain. Other causes are climbing stairs, kneeling, and other activities that use the knee. Everyday wear and tear, especially as you get older, also can cause knee pain. Rest, along with home treatment, often relieves pain and allows your knee to heal. If you have a serious knee injury, you may need tests and treatment. Follow-up care is a key part of your treatment and safety. Be sure to make and go to all appointments, and call your doctor if you are having problems. It's also a good idea to know your test results and keep a list of the medicines you take. How can you care for yourself at home? · Be safe with medicines. Read and follow all instructions on the label. ¨ If the doctor gave you a prescription medicine for pain, take it as prescribed. ¨ If you are not taking a prescription pain medicine, ask your doctor if you can take an over-the-counter medicine. · Rest and protect your knee. Take a break from any activity that may cause pain. · Put ice or a cold pack on your knee for 10 to 20 minutes at a time. Put a thin cloth between the ice and your skin. · Prop up a sore knee on a pillow when you ice it or anytime you sit or lie down for the next 3 days. Try to keep it above the level of your heart. This will help reduce swelling. · If your knee is not swollen, you can put moist heat, a heating pad, or a warm cloth on your knee. · If your doctor recommends an elastic bandage, sleeve, or other type of support for your knee, wear it as directed.   · Follow your doctor's instructions about how much weight you can put on your leg. Use a cane, crutches, or a walker as instructed. · Follow your doctor's instructions about activity during your healing process. If you can do mild exercise, slowly increase your activity. · Reach and stay at a healthy weight. Extra weight can strain the joints, especially the knees and hips, and make the pain worse. Losing even a few pounds may help. When should you call for help? Call 911 anytime you think you may need emergency care. For example, call if:  · You have symptoms of a blood clot in your lung (called a pulmonary embolism). These may include:  ¨ Sudden chest pain. ¨ Trouble breathing. ¨ Coughing up blood. Call your doctor now or seek immediate medical care if:  · You have severe or increasing pain. · Your leg or foot turns cold or changes color. · You cannot stand or put weight on your knee. · Your knee looks twisted or bent out of shape. · You cannot move your knee. · You have signs of infection, such as:  ¨ Increased pain, swelling, warmth, or redness. ¨ Red streaks leading from the knee. ¨ Pus draining from a place on your knee. ¨ A fever. · You have signs of a blood clot in your leg (called a deep vein thrombosis), such as:  ¨ Pain in your calf, back of the knee, thigh, or groin. ¨ Redness and swelling in your leg or groin. Watch closely for changes in your health, and be sure to contact your doctor if:  · You have tingling, weakness, or numbness in your knee. · You have any new symptoms, such as swelling. · You have bruises from a knee injury that last longer than 2 weeks. · You do not get better as expected. Where can you learn more? Go to http://megan-carly.info/. Enter K195 in the search box to learn more about \"Knee Pain or Injury: Care Instructions. \"  Current as of: March 20, 2017  Content Version: 11.3  © 8761-4078 [employer].  Care instructions adapted under license by [employer] (which disclaims liability or warranty for this information). If you have questions about a medical condition or this instruction, always ask your healthcare professional. [address] any warranty or liability for your use of this information. Wrist Sprain: Care Instructions  Your Care Instructions    Your wrist hurts because you have stretched or torn ligaments, which connect the bones in your wrist.  Wrist sprains usually take from 2 to 10 weeks to heal, but some take longer. Usually, the more pain you have, the more severe your wrist sprain is and the longer it will take to heal. You can heal faster and regain strength in your wrist with good home treatment. Follow-up care is a key part of your treatment and safety. Be sure to make and go to all appointments, and call your doctor if you are having problems. It's also a good idea to know your test results and keep a list of the medicines you take. How can you care for yourself at home? · Prop up your arm on a pillow when you ice it or anytime you sit or lie down for the next 3 days. Try to keep your wrist above the level of your heart. This will help reduce swelling. · Put ice or cold packs on your wrist for 10 to 20 minutes at a time. Try to do this every 1 to 2 hours for the next 3 days (when you are awake) or until the swelling goes down. Put a thin cloth between the ice pack and your skin. · After 2 or 3 days, if your swelling is gone, apply a heating pad set on low or a warm cloth to your wrist. This helps keep your wrist flexible. Some doctors suggest that you go back and forth between hot and cold. · If you have an elastic bandage, keep it on for the next 24 to 36 hours. The bandage should be snug but not so tight that it causes numbness or tingling. To rewrap the wrist, wrap the bandage around the hand a few times, beginning at the fingers. Then wrap it around the hand between the thumb and index finger, ending by circling the wrist several times.   · If your doctor gave you a splint or brace, wear it as directed to protect your wrist until it has healed. · Take pain medicines exactly as directed. ¨ If the doctor gave you a prescription medicine for pain, take it as prescribed. ¨ If you are not taking a prescription pain medicine, ask your doctor if you can take an over-the-counter medicine. · Try not to use your injured wrist and hand. When should you call for help? Call your doctor now or seek immediate medical care if:  · Your hand or fingers are cool or pale or change color. Watch closely for changes in your health, and be sure to contact your doctor if:  · Your pain gets worse. · Your wrist has not improved after 1 week. Where can you learn more? Go to http://megan-carly.info/. Enter G541 in the search box to learn more about \"Wrist Sprain: Care Instructions. \"  Current as of: March 21, 2017  Content Version: 11.3  © 0429-0159 [employer]. Care instructions adapted under license by [employer] (which disclaims liability or warranty for this information). If you have questions about a medical condition or this instruction, always ask your healthcare professional. [place] [address] any warranty or liability for your use of this information.

## 2017-10-12 NOTE — ED PROVIDER NOTES
HPI Comments: Pt is a 78year old female, history of anxeity, depression, HTN, hep B, asthma and hypercholesterolemia presents to the ED after she fell while at work. Pt has been giving pt flu shots. Pt states that she landed on her wrist and knee. The pain is an aching pain, worse with palpation and movement. Pain is a 5/10. She denies hitting her head, LOC, neck pain, back pain, numbness, weakness. She did not take anything for her symptoms but came directly to the ED. The history is provided by the patient. Past Medical History:   Diagnosis Date    Anxiety     Arrhythmia     A-Fib    Asthma     Depression     History of hepatitis B     Hypercholesterolemia     Hypertension     Renal artery stenosis ([facility] [address] 2006       Past Surgical History:   Procedure Laterality Date    CARDIAC SURG PROCEDURE UNLIST  4/28/2015    cardiac ablation     HX BLEPHAROPLASTY  04/26/2016    HX HYSTERECTOMY  1988    AR COLONOSCOPY FLX DX W/COLLJ SPEC WHEN PFRMD  12/01/2010    dr Arun Fishman         Family History:   Problem Relation Age of Onset    Cancer Mother     Cancer Father      lung    Heart Disease Brother     Heart Attack Brother     Lung Disease Brother      COPD    No Known Problems Sister     Hypertension Brother        Social History     Social History    Marital status:      Spouse name: N/A    Number of children: N/A    Years of education: N/A     Occupational History    Not on file. Social History Main Topics    Smoking status: Never Smoker    Smokeless tobacco: Never Used    Alcohol use 0.0 oz/week     0 Standard drinks or equivalent per week      Comment: socially    Drug use: No    Sexual activity: Yes     Partners: Male     Other Topics Concern    Not on file     Social History Narrative         ALLERGIES: Iodinated contrast- oral and iv dye; Clindamycin; Codeine; and Morphine    Review of Systems   Constitutional: Negative for fever.    Respiratory: Negative for chest tightness, shortness of breath and wheezing. Gastrointestinal: Negative for abdominal pain, diarrhea and vomiting. Musculoskeletal: Negative for back pain. Skin: Negative for rash. Neurological: Negative for headaches. All other systems reviewed and are negative. Vitals:    10/05/17 1720   BP: 149/83   Pulse: 65   Resp: 18   Temp: 98 °F (36.7 °C)   SpO2: 99%   Weight: 117.9 kg (260 lb)   Height: 5' 8\" (1.727 m)            Physical Exam   Constitutional: She is oriented to person, place, and time. She appears well-nourished. No distress. HENT:   Head: Normocephalic and atraumatic. Right Ear: External ear normal.   Left Ear: External ear normal.   Nose: Nose normal.   Mouth/Throat: Oropharynx is clear and moist. No oropharyngeal exudate. Eyes: Conjunctivae and EOM are normal. Pupils are equal, round, and reactive to light. Right eye exhibits no discharge. Left eye exhibits no discharge. No scleral icterus. Neck: Normal range of motion. Neck supple. Cardiovascular: Normal rate, regular rhythm, normal heart sounds and intact distal pulses. Exam reveals no gallop and no friction rub. No murmur heard. Pulmonary/Chest: Effort normal and breath sounds normal. No respiratory distress. Abdominal: Soft. Bowel sounds are normal. She exhibits no distension and no mass. There is no tenderness. There is no rebound and no guarding. Musculoskeletal: Normal range of motion. She exhibits no edema. Left shoulder: Normal.        Left elbow: Normal.        Left wrist: She exhibits tenderness. She exhibits normal range of motion and no swelling. Left knee: She exhibits swelling. She exhibits normal range of motion. Tenderness found. Cervical back: Normal.        Thoracic back: Normal.        Lumbar back: Normal.   Neurological: She is alert and oriented to person, place, and time. She has normal strength. No cranial nerve deficit. She exhibits normal muscle tone.    Skin: Skin is warm and dry. No rash noted. She is not diaphoretic. Psychiatric: She has a normal mood and affect. Her behavior is normal.   Nursing note and vitals reviewed. [facility]  ED Course       Procedures     Have spoken with attending physician about pt complaint and history. Agrees with plan of care in the emergency room. Progress note:  Discussed the xrays with pt. Will place in a brace for her wrist. Pt feels she can go back to work with no difficulty. Plan:  1. Fall  2. Wrist sprain   3. Knee pain     RICE. Follow up as needed. No fracture seen on xray. Patient's results have been reviewed with them. Patient and/or family have verbally conveyed their understanding and agreement of the patient's signs, symptoms, diagnosis, treatment and prognosis and additionally agree to follow up as recommended or return to the Emergency Room should their condition change prior to follow-up. Discharge instructions have also been provided to the patient with some educational information regarding their diagnosis as well a list of reasons why they would want to return to the ER prior to their follow-up appointment should their condition change.

## 2017-10-16 ENCOUNTER — OFFICE VISIT (OUTPATIENT)
Dept: FAMILY MEDICINE CLINIC | Age: 67
End: 2017-10-16

## 2017-10-16 VITALS
HEIGHT: 68 IN | DIASTOLIC BLOOD PRESSURE: 65 MMHG | RESPIRATION RATE: 17 BRPM | OXYGEN SATURATION: 96 % | TEMPERATURE: 97.6 F | SYSTOLIC BLOOD PRESSURE: 142 MMHG | BODY MASS INDEX: 38.83 KG/M2 | HEART RATE: 74 BPM | WEIGHT: 256.2 LBS

## 2017-10-16 DIAGNOSIS — M65.9 TENOSYNOVITIS, WRIST: Primary | ICD-10-CM

## 2017-10-16 RX ORDER — PREDNISONE 10 MG/1
TABLET ORAL
Refills: 1 | COMMUNITY
Start: 2017-08-23 | End: 2017-10-16 | Stop reason: ALTCHOICE

## 2017-10-16 NOTE — PATIENT INSTRUCTIONS
Aysha Ena Disease: Exercises  Your Care Instructions  Here are some examples of typical rehabilitation exercises for your condition. Start each exercise slowly. Ease off the exercise if you start to have pain. Your doctor or your physical or occupational therapist will tell you when you can start these exercises and which ones will work best for you. How to do the exercises  Thumb lifts    1. Place your hand on a flat surface, with your palm up. 2. Lift your thumb away from your palm to make a \"C\" shape. 3. Hold for about 6 seconds. 4. Repeat 8 to 12 times. Passive thumb MP flexion    1. Hold your hand in front of you, and turn your hand so your little finger faces down and your thumb faces up. (Your hand should be in the position used for shaking someone's hand.) You may also rest your hand on a flat surface. 2. Use the fingers on your other hand to bend your thumb down at the point where your thumb connects to your palm. 3. Hold for at least 15 to 30 seconds. 4. Repeat 2 to 4 times. Finkelstein stretch    1. Hold your arms out in front of you. (Your hand should be in the position used for shaking someone's hand.)  2. Bend your thumb toward your palm. 3. Use your other hand to gently stretch your thumb and wrist downward until you feel the stretch on the thumb side of your wrist.  4. Hold for at least 15 to 30 seconds. 5. Repeat 2 to 4 times. Resisted ulnar deviation    Note: For this exercise, you will need elastic exercise material, such as surgical tubing or Thera-Band. 1. Sit leaning forward with your legs slightly spread and your elbow on your thigh. 2. Grasp one end of the band with your palm down, and step on the other end with the foot opposite the hand holding the band. 3. Slowly bend your wrist sideways and away from your knee. 4. Repeat 8 to 12 times. Follow-up care is a key part of your treatment and safety.  Be sure to make and go to all appointments, and call your doctor if you are having problems. It's also a good idea to know your test results and keep a list of the medicines you take. Where can you learn more? Go to http://megan-carly.info/. Enter H979 in the search box to learn more about \"De Quervain's Disease: Exercises. \"  Current as of: March 21, 2017  Content Version: 11.3  © 4827-0102 [employer]. Care instructions adapted under license by [employer] (which disclaims liability or warranty for this information). If you have questions about a medical condition or this instruction, always ask your healthcare professional. [address] any warranty or liability for your use of this information.

## 2017-10-16 NOTE — PROGRESS NOTES
Chief Complaint   Patient presents with    Wrist Pain     pt states [facility] on 10[facility]17 and slipped on water in a pts room and landed on L wrist, was evaluated in ED and was given a brace for L wrist, still c/o L wrist pain, has also been using tylenol to help with discomfort

## 2017-10-16 NOTE — MR AVS SNAPSHOT
Visit Information Date & Time Provider Department Dept. Phone Encounter #  
 10/16/2017  8:45 [place] [address] [phone] [id number] Follow-up Instructions Return if symptoms worsen or fail to improve. Upcoming Health Maintenance Date Due  
 GLAUCOMA SCREENING Q2Y 3/14/2018 BREAST CANCER SCRN MAMMOGRAM 4/8/2018 Pneumococcal 65+ Low/Medium Risk (2 of 2 - PPSV23) 8/17/2018 MEDICARE YEARLY EXAM 9/6/2018 COLONOSCOPY 5/20/2021 DTaP/Tdap/Td series (2 - Td) 3/19/2024 Allergies as of 10/16/2017  Review Complete On: 10/16/2017 By: Pam Brothers DO Severity Noted Reaction Type Reactions Iodinated Contrast- Oral And Iv Dye High 03/27/2013   Systemic Swelling Clindamycin  06/27/2011    Nausea and Vomiting Codeine  06/27/2011    Other (comments) Headaches Morphine  06/27/2011    Other (comments)  
 headache Current Immunizations  Reviewed on 9/5/2017 Name Date Influenza Vaccine 10/17/2016, 10/12/2015 Influenza Vaccine Split 9/24/2012 Pneumococcal Conjugate (PCV-13) 8/17/2017 Tdap 3/19/2014 Zoster Vaccine, Live 6/1/2014 Not reviewed this visit You Were Diagnosed With   
  
 Codes Comments Tenosynovitis, wrist    -  Primary ICD-10-CM: M65.9 ICD-9-CM: 727.05 Vitals BP Pulse Temp Resp Height(growth percentile) Weight(growth percentile) 142/65 (BP 1 Location: Left arm, BP Patient Position: Sitting) 74 97.6 °F (36.4 °C) (Oral) 17 5' 8\" (1.727 m) 256 lb 3.2 oz (116.2 kg) SpO2 BMI OB Status Smoking Status 96% 38.96 kg/m2 Hysterectomy Never Smoker Vitals History BMI and BSA Data Body Mass Index Body Surface Area  
 38.96 kg/m 2 2.36 m 2 Preferred Pharmacy Pharmacy Name Phone [facility] [phone] [place] [id number] [phone] Your Updated Medication List  
  
   
 This list is accurate as of: 10/16/17  8:58 AM.  Always use your most recent med list.  
  
  
  
  
 * albuterol 90 mcg/actuation inhaler Commonly known as:  PROVENTIL HFA, VENTOLIN HFA, PROAIR HFA Take 1-2 Puffs by inhalation every four (4) hours as needed for Wheezing. * albuterol 2.5 mg /3 mL (0.083 %) nebulizer solution Commonly known as:  PROVENTIL VENTOLIN  
3 mL by Nebulization route every four (4) hours as needed for Wheezing. buPROPion  mg XL tablet Commonly known as:  Syble Ko Take 1 Tab by mouth every morning. CINNAMON 500 mg Cap Generic drug:  cinnamon bark Take 1,000 mg by mouth two (2) times a day. CLARITIN 10 mg tablet Generic drug:  loratadine Take 10 mg by mouth daily as needed for Allergies. desonide 0.05 % topical ointment Commonly known as:  DESOWEN  
as needed  
  
 escitalopram oxalate 20 mg tablet Commonly known as:  Shahrzad Moots Take 1 Tab by mouth daily. estradiol 0.5 mg tablet Commonly known as:  ESTRACE Take 0.5 mg by mouth daily. fluorouracil 5 % chemo cream  
Commonly known as:  EFUDEX  
daily as needed. fluticasone 50 mcg/actuation nasal spray Commonly known as:  Lin Solis 2 Sprays by Both Nostrils route daily as needed for Rhinitis. fluticasone-salmeterol 250-50 mcg/dose diskus inhaler Commonly known as:  ADVAIR Take 1 Puff by inhalation every twelve (12) hours. KRILL OIL PO Take 300 mg by mouth daily. losartan 50 mg tablet Commonly known as:  COZAAR Take 1 Tab by mouth daily. metoprolol succinate 100 mg tablet Commonly known as:  TOPROL-XL Take 1 Tab by mouth daily. MULTIVITAMIN PO Take 1 Tab by mouth daily. mupirocin 2 % ointment Commonly known as:  Harvis Dress  
as needed. nystatin topical cream  
Commonly known as:  MYCOSTATIN Apply  to affected area as needed. pantoprazole 40 mg tablet Commonly known as:  PROTONIX Take 1 Tab by mouth daily. * triamcinolone acetonide 0.1 % ointment Commonly known as:  KENALOG Apply  to affected area as needed. * triamcinolone acetonide 0.1 % topical cream  
Commonly known as:  KENALOG  
APPLY TO AFFECTED AREA BID FOR 7 DAYS  
  
 VITAMIN D 2,000 unit Cap capsule Generic drug:  Cholecalciferol (Vitamin D3) Take 1 Tab by mouth daily. * Notice: This list has 4 medication(s) that are the same as other medications prescribed for you. Read the directions carefully, and ask your doctor or other care provider to review them with you. We Performed the Following REFERRAL TO HAND SURGERY [REF31 Custom] Follow-up Instructions Return if symptoms worsen or fail to improve. Referral Information Referral ID Referred By Referred To  
  
 [id number] [facility] Eleazar Ramos Phone: [phone] Fax: [phone] Visits Status Start Date End Date 1 New Request 10/16/17 10/16/18 If your referral has a status of pending review or denied, additional information will be sent to support the outcome of this decision. Patient Instructions Star Jose Disease: Exercises Your Care Instructions Here are some examples of typical rehabilitation exercises for your condition. Start each exercise slowly. Ease off the exercise if you start to have pain. Your doctor or your physical or occupational therapist will tell you when you can start these exercises and which ones will work best for you. How to do the exercises Thumb lifts 1. Place your hand on a flat surface, with your palm up. 2. Lift your thumb away from your palm to make a \"C\" shape. 3. Hold for about 6 seconds. 4. Repeat 8 to 12 times. Passive thumb MP flexion 1.  Hold your hand in front of you, and turn your hand so your little finger faces down and your thumb faces up. (Your hand should be in the position used for shaking someone's hand.) You may also rest your hand on a flat surface. 2. Use the fingers on your other hand to bend your thumb down at the point where your thumb connects to your palm. 3. Hold for at least 15 to 30 seconds. 4. Repeat 2 to 4 times. Finkelstein stretch 1. Hold your arms out in front of you. (Your hand should be in the position used for shaking someone's hand.) 2. Bend your thumb toward your palm. 3. Use your other hand to gently stretch your thumb and wrist downward until you feel the stretch on the thumb side of your wrist. 
4. Hold for at least 15 to 30 seconds. 5. Repeat 2 to 4 times. Resisted ulnar deviation Note: For this exercise, you will need elastic exercise material, such as surgical tubing or Thera-Band. 1. Sit leaning forward with your legs slightly spread and your elbow on your thigh. 2. Grasp one end of the band with your palm down, and step on the other end with the foot opposite the hand holding the band. 3. Slowly bend your wrist sideways and away from your knee. 4. Repeat 8 to 12 times. Follow-up care is a key part of your treatment and safety. Be sure to make and go to all appointments, and call your doctor if you are having problems. It's also a good idea to know your test results and keep a list of the medicines you take. Where can you learn more? Go to http://megan-caryl.info/. Enter V801 in the search box to learn more about \"De Quervain's Disease: Exercises. \" Current as of: March 21, 2017 Content Version: 11.3 © 0219-8106 [employer]. Care instructions adapted under license by [employer] (which disclaims liability or warranty for this information).  If you have questions about a medical condition or this instruction, always ask your healthcare professional. [employer] disclaims any warranty or liability for your use of this information. Introducing [facility]! Dear Virgil Villanueva: 
Thank you for requesting a eyetok account. Our records indicate that you already have an active eyetok account. You can access your account anytime at [email or web address] Did you know that you can access your hospital and ER discharge instructions at any time in eyetok? You can also review all of your test results from your hospital stay or ER visit. Additional Information If you have questions, please visit the Frequently Asked Questions section of the eyetok website at [email or web address] Remember, eyetok is NOT to be used for urgent needs. For medical emergencies, dial 911. Now available from your iPhone and Android! Please provide this summary of care documentation to your next provider. Your primary care clinician is listed as Miguel Morgan. If you have any questions after today's visit, please call [phone].

## 2017-10-16 NOTE — PROGRESS NOTES
Dhaval Coates is a 78 y.o. 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 tsates she still has bruising of her knee but is 100% better. Continues to have L wrist pain particularly by her thumb. Has been using the brace on and of, when she is concerned she may hit it. Taking motrin 600 roughly bid prn.    she is a 78y.o. year old female who presents for evalution. Reviewed PmHx, RxHx, FmHx, SocHx, AllgHx and updated and dated in the chart. Review of Systems - negative except as listed above in the HPI    Objective:     Vitals:    10/16/17 0841   BP: 142/65   Pulse: 74   Resp: 17   Temp: 97.6 °F (36.4 °C)   TempSrc: Oral   SpO2: 96%   Weight: 256 lb 3.2 oz (116.2 kg)   Height: 5' 8\" (1.727 m)       Current Outpatient Prescriptions   Medication Sig    metoprolol succinate (TOPROL-XL) 100 mg tablet Take 1 Tab by mouth daily.  triamcinolone acetonide (KENALOG) 0.1 % topical cream APPLY TO AFFECTED AREA BID FOR 7 DAYS    desonide (DESOWEN) 0.05 % topical ointment as needed    fluticasone-salmeterol (ADVAIR) 250-50 mcg/dose diskus inhaler Take 1 Puff by inhalation every twelve (12) hours.  losartan (COZAAR) 50 mg tablet Take 1 Tab by mouth daily.  pantoprazole (PROTONIX) 40 mg tablet Take 1 Tab by mouth daily.  escitalopram oxalate (LEXAPRO) 20 mg tablet Take 1 Tab by mouth daily.  buPROPion XL (WELLBUTRIN XL) 300 mg XL tablet Take 1 Tab by mouth every morning.  estradiol (ESTRACE) 0.5 mg tablet Take 0.5 mg by mouth daily.  triamcinolone acetonide (KENALOG) 0.1 % ointment Apply  to affected area as needed.     fluticasone (FLONASE) 50 mcg/actuation nasal spray 2 Sprays by Both Nostrils route daily as needed for Rhinitis.  cinnamon bark (CINNAMON) 500 mg cap Take 1,000 mg by mouth two (2) times a day.  Cholecalciferol, Vitamin D3, (VITAMIN D) 2,000 unit Cap Take 1 Tab by mouth daily.  albuterol (PROVENTIL VENTOLIN) 2.5 mg /3 mL (0.083 %) nebulizer solution 3 mL by Nebulization route every four (4) hours as needed for Wheezing.  albuterol (PROVENTIL HFA, VENTOLIN HFA, PROAIR HFA) 90 mcg/actuation inhaler Take 1-2 Puffs by inhalation every four (4) hours as needed for Wheezing.  fluorouracil (EFUDEX) 5 % chemo cream daily as needed.  mupirocin (BACTROBAN) 2 % ointment as needed.  nystatin (MYCOSTATIN) topical cream Apply  to affected area as needed.  loratadine (CLARITIN) 10 mg tablet Take 10 mg by mouth daily as needed for Allergies.  KRILL OIL PO Take 300 mg by mouth daily.  MULTIVITAMIN PO Take 1 Tab by mouth daily. No current facility-administered medications for this visit. Physical Examination: General appearance - alert, well appearing, and in no distress  Musculoskeletal - + finkelstein on L with ttp over thenar eminence, no ecchymoses      Assessment/ Plan:   Diagnoses and all orders for this visit:    1. Tenosynovitis, wrist  -     REFERRAL TO HAND SURGERY     motrin 600-800 po q6-8 and given exercises to perform, no improvement in next couple weeks f/u with hand  Follow-up Disposition:  Return if symptoms worsen or fail to improve. I have discussed the diagnosis with the patient and the intended plan as seen in the above orders. The patient has received an after-visit summary and questions were answered concerning future plans. Pt conveyed understanding of plan.     Medication Side Effects and Warnings were discussed with patient      [address] [facility],

## 2017-12-18 RX ORDER — BUPROPION HYDROCHLORIDE 300 MG/1
300 TABLET ORAL
Qty: 90 TAB | Refills: 3 | Status: SHIPPED | OUTPATIENT
Start: 2017-12-18 | End: 2018-04-23 | Stop reason: SDUPTHER

## 2017-12-18 RX ORDER — ESCITALOPRAM OXALATE 20 MG/1
20 TABLET ORAL DAILY
Qty: 90 TAB | Refills: 3 | Status: SHIPPED | OUTPATIENT
Start: 2017-12-18 | End: 2018-04-23 | Stop reason: SDUPTHER

## 2018-04-23 DIAGNOSIS — K21.9 GASTROESOPHAGEAL REFLUX DISEASE WITHOUT ESOPHAGITIS: ICD-10-CM

## 2018-04-23 DIAGNOSIS — F32.A DEPRESSION, UNSPECIFIED DEPRESSION TYPE: Primary | ICD-10-CM

## 2018-04-23 DIAGNOSIS — I10 BENIGN ESSENTIAL HYPERTENSION: ICD-10-CM

## 2018-04-23 DIAGNOSIS — I47.1 PAROXYSMAL SVT (SUPRAVENTRICULAR TACHYCARDIA) (HCC): ICD-10-CM

## 2018-04-24 RX ORDER — PANTOPRAZOLE SODIUM 40 MG/1
40 TABLET, DELAYED RELEASE ORAL DAILY
Qty: 90 TAB | Refills: 3 | Status: SHIPPED | OUTPATIENT
Start: 2018-04-24 | End: 2019-04-05 | Stop reason: SDUPTHER

## 2018-04-24 RX ORDER — LOSARTAN POTASSIUM 50 MG/1
50 TABLET ORAL DAILY
Qty: 90 TAB | Refills: 3 | Status: SHIPPED | OUTPATIENT
Start: 2018-04-24 | End: 2019-03-23 | Stop reason: SDUPTHER

## 2018-04-24 RX ORDER — ESCITALOPRAM OXALATE 20 MG/1
20 TABLET ORAL DAILY
Qty: 90 TAB | Refills: 3 | Status: SHIPPED | OUTPATIENT
Start: 2018-04-24 | End: 2019-03-23 | Stop reason: SDUPTHER

## 2018-04-24 RX ORDER — METOPROLOL SUCCINATE 100 MG/1
100 TABLET, EXTENDED RELEASE ORAL DAILY
Qty: 90 TAB | Refills: 3 | Status: SHIPPED | OUTPATIENT
Start: 2018-04-24 | End: 2019-04-30 | Stop reason: SDUPTHER

## 2018-04-24 RX ORDER — BUPROPION HYDROCHLORIDE 300 MG/1
300 TABLET ORAL
Qty: 90 TAB | Refills: 3 | Status: SHIPPED | OUTPATIENT
Start: 2018-04-24 | End: 2019-03-23 | Stop reason: SDUPTHER

## 2018-04-24 NOTE — TELEPHONE ENCOUNTER
From: Maribel Melvin  To: Lamar Garcia MD  Sent: 4/23/2018 6:07 PM EDT  Subject: Medication Renewal Request    Original authorizing provider: MD Shannon Olmos.  Tay Mendiola would like a refill of the following medications:  losartan (COZAAR) 50 mg tablet [Lulu Martínez MD]  pantoprazole (PROTONIX) 40 mg tablet [Lulu Martínez MD]  metoprolol succinate (TOPROL-XL) 100 mg tablet [Lulu Martínez MD]  escitalopram oxalate (LEXAPRO) 20 mg tablet [Lulu Martínez MD]  buPROPion XL (WELLBUTRIN XL) 300 mg XL tablet Lamar Garcia MD]    Preferred pharmacy: [place], [place]    Comment:

## 2018-05-15 ENCOUNTER — OFFICE VISIT (OUTPATIENT)
Dept: FAMILY MEDICINE CLINIC | Age: 68
End: 2018-05-15

## 2018-05-15 VITALS
RESPIRATION RATE: 20 BRPM | TEMPERATURE: 97.8 F | HEIGHT: 68 IN | HEART RATE: 69 BPM | OXYGEN SATURATION: 96 % | DIASTOLIC BLOOD PRESSURE: 59 MMHG | BODY MASS INDEX: 38.49 KG/M2 | SYSTOLIC BLOOD PRESSURE: 131 MMHG | WEIGHT: 254 LBS

## 2018-05-15 DIAGNOSIS — I10 ESSENTIAL HYPERTENSION, BENIGN: ICD-10-CM

## 2018-05-15 DIAGNOSIS — M17.11 PRIMARY OSTEOARTHRITIS OF RIGHT KNEE: ICD-10-CM

## 2018-05-15 DIAGNOSIS — Z01.818 PRE-OP EXAMINATION: Primary | ICD-10-CM

## 2018-05-15 DIAGNOSIS — J45.20 MILD INTERMITTENT ASTHMA WITHOUT COMPLICATION: ICD-10-CM

## 2018-05-15 DIAGNOSIS — Z51.81 ENCOUNTER FOR MEDICATION MONITORING: ICD-10-CM

## 2018-05-15 DIAGNOSIS — E78.00 HYPERCHOLESTEROLEMIA: ICD-10-CM

## 2018-05-15 DIAGNOSIS — J30.89 ENVIRONMENTAL AND SEASONAL ALLERGIES: ICD-10-CM

## 2018-05-15 DIAGNOSIS — R73.02 GLUCOSE INTOLERANCE (IMPAIRED GLUCOSE TOLERANCE): ICD-10-CM

## 2018-05-15 DIAGNOSIS — F32.A DEPRESSION, UNSPECIFIED DEPRESSION TYPE: ICD-10-CM

## 2018-05-15 PROBLEM — E66.01 SEVERE OBESITY (BMI 35.0-39.9) WITH COMORBIDITY (HCC): Status: ACTIVE | Noted: 2018-05-15

## 2018-05-15 RX ORDER — MONTELUKAST SODIUM 10 MG/1
10 TABLET ORAL DAILY
COMMUNITY
End: 2021-04-28 | Stop reason: SDUPTHER

## 2018-05-15 RX ORDER — ESTRADIOL 0.5 MG/1
0.5 TABLET ORAL DAILY
Qty: 45 TAB | Refills: 3 | Status: SHIPPED | OUTPATIENT
Start: 2018-05-15 | End: 2018-06-10 | Stop reason: SDUPTHER

## 2018-05-15 NOTE — PROGRESS NOTES
HISTORY OF PRESENT ILLNESS  Manual Mariela is a 78 y.o. female. HPI   Pre-op for right TKR d/t osteoarthritis. Cardiovascular Review:  The patient has hypertension, hyperlipidemia and obesity. H/o PAF and SVT. S/p ablation by cardiology 8/2017. Diet and Lifestyle: generally follows a low fat low cholesterol diet, generally follows a low sodium diet, follows a diabetic diet regularly, exercises regularly. Pertinent ROS: taking medications as instructed, no medication side effects noted, no TIA's, no chest pain on exertion, no dyspnea on exertion, no swelling of ankles. Diabetes Mellitus:  She has glucose intolerance. Diabetic ROS - diabetic diet compliance: compliant most of the time, further diabetic ROS: no polyuria or polydipsia, no chest pain, dyspnea or TIA's, no numbness, tingling or pain in extremities, no unusual visual symptoms. Lab review: orders written for new lab studies as appropriate; see orders. Depression Review:  Patient is seen for followup of depression. Treatment includes Wellbutrin, Lexapro. Ongoing symptoms include none, feeling great. She denies depressed mood, insomnia and fatigue. She experiences the following side effects from the treatment: none. Asthma Review:  The patient is being seen for follow up of asthma and allergies, not currently in exacerbation. Symptoms occur: less than 1x month and usually with respiratory infections. Wheezing when present is described as mild and easily relieved with rescue bronchodilator. Current limitations in activity from asthma: none. Frequency of use of quick-relief meds: prn.      Patient Active Problem List   Diagnosis Code    Depression F32.9    Anxiety F41.9    Hypercholesterolemia E78.00    Renal artery stenosis (HCC) I70.1    History of hepatitis B Z86.19    Glucose intolerance (impaired glucose tolerance) R73.02    Essential hypertension, benign I10    Encounter for medication monitoring Z51.81    H/O atrial fibrillation without current medication Z86.79    Asthmatic bronchitis J45.909    Environmental and seasonal allergies J30.89    Severe obesity (BMI 35.0-39. 9) with comorbidity (HCC) E66.01       Current Outpatient Prescriptions   Medication Sig Dispense Refill    montelukast (SINGULAIR) 10 mg tablet Take 10 mg by mouth daily.  losartan (COZAAR) 50 mg tablet Take 1 Tab by mouth daily. 90 Tab 3    pantoprazole (PROTONIX) 40 mg tablet Take 1 Tab by mouth daily. 90 Tab 3    metoprolol succinate (TOPROL-XL) 100 mg tablet Take 1 Tab by mouth daily. 90 Tab 3    escitalopram oxalate (LEXAPRO) 20 mg tablet Take 1 Tab by mouth daily. 90 Tab 3    buPROPion XL (WELLBUTRIN XL) 300 mg XL tablet Take 1 Tab by mouth every morning. 90 Tab 3    albuterol (PROVENTIL VENTOLIN) 2.5 mg /3 mL (0.083 %) nebulizer solution 3 mL by Nebulization route every four (4) hours as needed for Wheezing. 24 Each 11    albuterol (PROVENTIL HFA, VENTOLIN HFA, PROAIR HFA) 90 mcg/actuation inhaler Take 1-2 Puffs by inhalation every four (4) hours as needed for Wheezing. 1 Inhaler 11    desonide (DESOWEN) 0.05 % topical ointment daily as needed  2    estradiol (ESTRACE) 0.5 mg tablet Take 0.5 mg by mouth daily.  fluorouracil (EFUDEX) 5 % chemo cream daily as needed. 0    nystatin (MYCOSTATIN) topical cream Apply  to affected area daily as needed. 11    triamcinolone acetonide (KENALOG) 0.1 % ointment Apply  to affected area daily as needed. 11    fluticasone (FLONASE) 50 mcg/actuation nasal spray 2 Sprays by Both Nostrils route daily as needed for Rhinitis. 3 Bottle 3    cinnamon bark (CINNAMON) 500 mg cap Take 500 mg by mouth two (2) times a day.  KRILL OIL PO Take 300 mg by mouth daily.  Cholecalciferol, Vitamin D3, (VITAMIN D) 2,000 unit Cap Take 1 Tab by mouth daily.  MULTIVITAMIN PO Take 1 Tab by mouth daily.          Allergies   Allergen Reactions    Iodinated Contrast- Oral And Iv Dye Swelling    Clindamycin Nausea and Vomiting    Codeine Other (comments)     Headaches      Morphine Other (comments)     headache       Past Medical History:   Diagnosis Date    Anxiety     Arrhythmia     hx A-Fib    Arthritis     knees    Asthma     Depression     History of hepatitis B     Hypercholesterolemia     Hypertension     Renal artery stenosis ([facility] [address] 2006       Past Surgical History:   Procedure Laterality Date    CARDIAC SURG PROCEDURE UNLIST  4/28/2015    cardiac ablation     HX BLEPHAROPLASTY  04/26/2016    HX COLONOSCOPY  05/20/2016    \"    HX HYSTERECTOMY  1988    MO COLONOSCOPY FLX DX W/COLLJ SPEC WHEN PFRMD  12/01/2010    dr Rodriguez Pack       Family History   Problem Relation Age of Onset    Cancer Mother      multiple sites   Levan Sous Cancer Father      lung    Heart Disease Father     Heart Disease Brother     Heart Attack Brother     Lung Disease Brother      COPD    No Known Problems Sister     Hypertension Brother        Social History   Substance Use Topics    Smoking status: Never Smoker    Smokeless tobacco: Never Used    Alcohol use 0.0 oz/week     0 Standard drinks or equivalent per week      Comment: socially        Review of Systems   Constitutional: Negative for malaise/fatigue. HENT: Negative for congestion. Eyes: Negative for blurred vision. Respiratory: Negative for cough and shortness of breath. Cardiovascular: Negative for chest pain, palpitations and leg swelling. Gastrointestinal: Negative for abdominal pain, constipation and heartburn. Genitourinary: Negative for dysuria, frequency and urgency. Musculoskeletal: Positive for joint pain. Negative for back pain. Neurological: Negative for dizziness, tingling and headaches. Endo/Heme/Allergies: Negative for environmental allergies. Psychiatric/Behavioral: Negative for depression. The patient does not have insomnia. Physical Exam   Constitutional: She appears well-developed and well-nourished.    BP 131/59 (BP 1 Location: Left arm, BP Patient Position: Sitting)  Pulse 69  Temp 97.8 °F (36.6 °C) (Oral)   Resp 20  Ht 5' 8\" (1.727 m)  Wt 254 lb (115.2 kg)  SpO2 96%  BMI 38.62 kg/m2   HENT:   Right Ear: Tympanic membrane and ear canal normal.   Left Ear: Tympanic membrane and ear canal normal.   Nose: No mucosal edema or rhinorrhea. Mouth/Throat: Oropharynx is clear and moist and mucous membranes are normal.   Neck: Normal range of motion. Neck supple. No thyromegaly present. Cardiovascular: Normal rate and regular rhythm. No murmur heard. Pulmonary/Chest: Effort normal and breath sounds normal.   Abdominal: Soft. Bowel sounds are normal. There is no tenderness. Musculoskeletal: Normal range of motion. She exhibits no edema. Right knee: Tenderness found. Medial joint line and lateral joint line tenderness noted. Lymphadenopathy:     She has no cervical adenopathy. Skin: Skin is warm and dry. Psychiatric: She has a normal mood and affect. Nursing note and vitals reviewed. ASSESSMENT and PLAN  Diagnoses and all orders for this visit:    1. Pre-op examination//  2. Primary osteoarthritis of right knee       Medically stable for surgery pending reviewed of her labs and EKG. 3. Essential hypertension, benign  Stable     4. Hypercholesterolemia  Stable     5. Glucose intolerance (impaired glucose tolerance)  Stable     6. Depression, unspecified depression type  Stable     7. Mild intermittent asthma without complication  8. Environmental and seasonal allergies  Stable     9. Encounter for medication monitoring      Follow-up Disposition:  Return in about 5 months (around 10/17/2018). Through the use of shared decision making we have agreed to the above plan. The patient has received an after-visit summary and questions were answered concerning future plans and follow up.

## 2018-05-15 NOTE — PROGRESS NOTES
Chief Complaint   Patient presents with    Pre-op Exam     patient is to have total knee surgery     Patient stated last Mammogram 05/2017    Colonoscopy 05/20/2016 Dr Rigoberto Jackson repeat in 5 years    Patient stated last eye exam 02/2018    1. Have you been to the ER, urgent care clinic since your last visit? Hospitalized since your last visit? No    2. Have you seen or consulted any other health care providers outside of the [address] [place] [address] since your last visit? Include any pap smears or colon screening.  No     Patient 3/10 pain right knee

## 2018-05-15 NOTE — MR AVS SNAPSHOT
[address] [facility] 
 
 
 [address] [facility] [place] [address] [address] 
[phone] Patient: Yin Lebron MRN: [id number] Visit Information Date & Time Provider Department Dept. Phone Encounter #  
 5/15/2018 11:45 AM Damián Sorensen MD [facility] [phone] [id number] Follow-up Instructions Return in about 5 months (around 10/17/2018). Upcoming Health Maintenance Date Due  
 GLAUCOMA SCREENING Q2Y 3/14/2018 BREAST CANCER SCRN MAMMOGRAM 4/8/2018 Influenza Age 5 to Adult 8/1/2018 Pneumococcal 65+ Low/Medium Risk (2 of 2 - PPSV23) 8/17/2018 MEDICARE YEARLY EXAM 9/6/2018 COLONOSCOPY 5/20/2021 DTaP/Tdap/Td series (2 - Td) 3/19/2024 Allergies as of 5/15/2018  Review Complete On: 5/15/2018 By: Damián Sorensen MD  
  
 Severity Noted Reaction Type Reactions Iodinated Contrast- Oral And Iv Dye High 03/27/2013   Systemic Swelling Clindamycin  06/27/2011    Nausea and Vomiting Codeine  06/27/2011    Other (comments) Headaches Morphine  06/27/2011    Other (comments)  
 headache Current Immunizations  Reviewed on 9/5/2017 Name Date Influenza Vaccine 10/17/2016, 10/12/2015 Influenza Vaccine Split 9/24/2012 Pneumococcal Conjugate (PCV-13) 8/17/2017 Tdap 3/19/2014 Zoster Vaccine, Live 6/1/2014 Not reviewed this visit Vitals BP Pulse Temp Resp Height(growth percentile) Weight(growth percentile) 131/59 (BP 1 Location: Left arm, BP Patient Position: Sitting) 69 97.8 °F (36.6 °C) (Oral) 20 5' 8\" (1.727 m) 254 lb (115.2 kg) SpO2 BMI OB Status Smoking Status 96% 38.62 kg/m2 Hysterectomy Never Smoker BMI and BSA Data Body Mass Index Body Surface Area  
 38.62 kg/m 2 2.35 m 2 Preferred Pharmacy Pharmacy Name Phone [address] [facility] [address] [place] [address] [phone] Your Updated Medication List  
  
   
This list is accurate as of 5/15/18 12:49 PM.  Always use your most recent med list.  
  
  
  
  
 * albuterol 90 mcg/actuation inhaler Commonly known as:  PROVENTIL HFA, VENTOLIN HFA, PROAIR HFA Take 1-2 Puffs by inhalation every four (4) hours as needed for Wheezing. * albuterol 2.5 mg /3 mL (0.083 %) nebulizer solution Commonly known as:  PROVENTIL VENTOLIN  
3 mL by Nebulization route every four (4) hours as needed for Wheezing. buPROPion  mg XL tablet Commonly known as:  Vern Ranks Take 1 Tab by mouth every morning. CINNAMON 500 mg Cap Generic drug:  cinnamon bark Take 500 mg by mouth two (2) times a day. desonide 0.05 % topical ointment Commonly known as:  DESOWEN  
daily as needed  
  
 escitalopram oxalate 20 mg tablet Commonly known as:  Levorn Allegra Take 1 Tab by mouth daily. estradiol 0.5 mg tablet Commonly known as:  ESTRACE Take 0.5 mg by mouth daily. fluorouracil 5 % chemo cream  
Commonly known as:  EFUDEX  
daily as needed. fluticasone 50 mcg/actuation nasal spray Commonly known as:  Velma Rumsey 2 Sprays by Both Nostrils route daily as needed for Rhinitis. KRILL OIL PO Take 300 mg by mouth daily. losartan 50 mg tablet Commonly known as:  COZAAR Take 1 Tab by mouth daily. metoprolol succinate 100 mg tablet Commonly known as:  TOPROL-XL Take 1 Tab by mouth daily. MULTIVITAMIN PO Take 1 Tab by mouth daily. nystatin topical cream  
Commonly known as:  MYCOSTATIN Apply  to affected area daily as needed. pantoprazole 40 mg tablet Commonly known as:  PROTONIX Take 1 Tab by mouth daily. SINGULAIR 10 mg tablet Generic drug:  montelukast  
Take 10 mg by mouth daily. triamcinolone acetonide 0.1 % ointment Commonly known as:  KENALOG Apply  to affected area daily as needed. VITAMIN D 2,000 unit Cap capsule Generic drug:  Cholecalciferol (Vitamin D3) Take 1 Tab by mouth daily. * Notice: This list has 2 medication(s) that are the same as other medications prescribed for you. Read the directions carefully, and ask your doctor or other care provider to review them with you. Follow-up Instructions Return in about 5 months (around 10/17/2018). Introducing [facility]! Dear Rosalina Barron: 
Thank you for requesting a Pacinian account. Our records indicate that you already have an active Pacinian account. You can access your account anytime at [email or web address] Did you know that you can access your hospital and ER discharge instructions at any time in Pacinian? You can also review all of your test results from your hospital stay or ER visit. Additional Information If you have questions, please visit the Frequently Asked Questions section of the Pacinian website at [email or web address] Remember, Pacinian is NOT to be used for urgent needs. For medical emergencies, dial 911. Now available from your iPhone and Android! Please provide this summary of care documentation to your next provider. Your primary care clinician is listed as Gera Paula. If you have any questions after today's visit, please call [phone].

## 2018-06-10 DIAGNOSIS — Z90.710 H/O: HYSTERECTOMY: Primary | ICD-10-CM

## 2018-06-11 ENCOUNTER — TELEPHONE (OUTPATIENT)
Dept: FAMILY MEDICINE CLINIC | Age: 68
End: 2018-06-11

## 2018-06-11 RX ORDER — ESTRADIOL 0.5 MG/1
0.5 TABLET ORAL DAILY
Qty: 45 TAB | Refills: 3 | OUTPATIENT
Start: 2018-06-11 | End: 2018-10-23 | Stop reason: SDUPTHER

## 2018-06-11 RX ORDER — ESTRADIOL 0.5 MG/1
0.5 TABLET ORAL DAILY
Qty: 45 TAB | Refills: 3 | OUTPATIENT
Start: 2018-06-11 | End: 2018-10-23 | Stop reason: ALTCHOICE

## 2018-06-11 NOTE — TELEPHONE ENCOUNTER
From: Carla Mccurdy  To: Nura Ray MD  Sent: 6/10/2018 3:41 PM EDT  Subject: Medication Renewal Request    Original authorizing provider: MD Rupinder Agarwal.  Juan Manuel Kidd would like a refill of the following medications:  estradiol (ESTRACE) 0.5 mg tablet Nura Ray MD]    Preferred pharmacy: [place], [place]    Comment:

## 2018-06-11 NOTE — TELEPHONE ENCOUNTER
Call placed to [address] [facility] [address] home Delivery (#[phone]),  Estradiol (Estrace) 0.5 mg tab dispense 90 with 3 refills (as per Dr. Estrellita Clay) phoned in - Instruction given,  Pharmacist \"Shirin\" repeated and stated understanding.   Pt notified,

## 2018-06-11 NOTE — TELEPHONE ENCOUNTER
From: [facility] Staff  To: Yusef Estes MD  Sent: 6/10/2018 3:36 PM EDT  Subject: Medication Renewal Request    Original authorizing provider: Yusef Estes MD    [facility]  Cortez Sandoval would like a refill of the following medications:  estradiol (ESTRACE) 0.5 mg tablet Yusef Estes MD]    Preferred pharmacy: [place], [place]    Comment:

## 2018-10-23 ENCOUNTER — HOSPITAL ENCOUNTER (OUTPATIENT)
Dept: LAB | Age: 68
Discharge: HOME | End: 2018-10-23
Payer: MEDICARE

## 2018-10-23 ENCOUNTER — OFFICE VISIT (OUTPATIENT)
Dept: FAMILY MEDICINE CLINIC | Age: 68
End: 2018-10-23

## 2018-10-23 VITALS
TEMPERATURE: 97.4 F | DIASTOLIC BLOOD PRESSURE: 80 MMHG | SYSTOLIC BLOOD PRESSURE: 136 MMHG | WEIGHT: 231.6 LBS | BODY MASS INDEX: 35.1 KG/M2 | HEART RATE: 64 BPM | OXYGEN SATURATION: 96 % | HEIGHT: 68 IN | RESPIRATION RATE: 16 BRPM

## 2018-10-23 DIAGNOSIS — E66.9 NON MORBID OBESITY: ICD-10-CM

## 2018-10-23 DIAGNOSIS — Z23 ENCOUNTER FOR IMMUNIZATION: ICD-10-CM

## 2018-10-23 DIAGNOSIS — Z00.00 MEDICARE ANNUAL WELLNESS VISIT, SUBSEQUENT: Primary | ICD-10-CM

## 2018-10-23 DIAGNOSIS — J98.9 REACTIVE AIRWAY DISEASE THAT IS NOT ASTHMA: ICD-10-CM

## 2018-10-23 DIAGNOSIS — K21.9 GASTROESOPHAGEAL REFLUX DISEASE WITHOUT ESOPHAGITIS: ICD-10-CM

## 2018-10-23 DIAGNOSIS — F32.A DEPRESSION, UNSPECIFIED DEPRESSION TYPE: ICD-10-CM

## 2018-10-23 DIAGNOSIS — J30.89 ENVIRONMENTAL AND SEASONAL ALLERGIES: ICD-10-CM

## 2018-10-23 DIAGNOSIS — R73.02 GLUCOSE INTOLERANCE (IMPAIRED GLUCOSE TOLERANCE): ICD-10-CM

## 2018-10-23 DIAGNOSIS — Z51.81 ENCOUNTER FOR MEDICATION MONITORING: ICD-10-CM

## 2018-10-23 DIAGNOSIS — I47.1 PAROXYSMAL SVT (SUPRAVENTRICULAR TACHYCARDIA) (HCC): ICD-10-CM

## 2018-10-23 DIAGNOSIS — I10 ESSENTIAL HYPERTENSION, BENIGN: ICD-10-CM

## 2018-10-23 DIAGNOSIS — Z23 NEED FOR SHINGLES VACCINE: ICD-10-CM

## 2018-10-23 DIAGNOSIS — E78.00 HYPERCHOLESTEROLEMIA: ICD-10-CM

## 2018-10-23 LAB — HBA1C MFR BLD HPLC: 5.4 %

## 2018-10-23 PROCEDURE — 80053 COMPREHEN METABOLIC PANEL: CPT

## 2018-10-23 PROCEDURE — 80061 LIPID PANEL: CPT

## 2018-10-23 PROCEDURE — 85027 COMPLETE CBC AUTOMATED: CPT

## 2018-10-23 PROCEDURE — 36415 COLL VENOUS BLD VENIPUNCTURE: CPT

## 2018-10-23 NOTE — PROGRESS NOTES
Chief Complaint Patient presents with Kimi Annual Wellness Visit Medicare Patient states last mammogram was 7/2018 at [facility]. Patient signed medical release. 1. Have you been to the ER, urgent care clinic since your last visit? Hospitalized since your last visit? No 
 
2. Have you seen or consulted any other health care providers outside of the [address] [place] [address] since your last visit? Include any pap smears or colon screening.  No

## 2018-10-23 NOTE — PROGRESS NOTES
This is a Subsequent Medicare Annual Wellness Visit providing Personalized Prevention Plan Services (PPPS) (Performed 12 months after initial AWV and PPPS ) I have reviewed the patient's medical history in detail and updated the computerized patient record. Cardiovascular Review: 
The patient has hypertension, hyperlipidemia and obesity. H/o PAF and SVT. S/p ablation by cardiology. Has had follow up with cardiology. Diet and Lifestyle: generally follows a low fat low cholesterol diet, generally follows a low sodium diet, follows a diabetic diet regularly, exercises regularly, attended class at the [facility] which was helpful. Home BP Monitoring: is not measured at home. Pertinent ROS: taking medications as instructed, no medication side effects noted, no TIA's, no chest pain on exertion, no dyspnea on exertion, no swelling of ankles. Diabetes Mellitus: 
She has glucose intolerance. Her weight is down by about 25 pounds. Diabetic ROS - diabetic diet compliance: compliant most of the time, further diabetic ROS: no polyuria or polydipsia, no chest pain, dyspnea or TIA's, no numbness, tingling or pain in extremities, no unusual visual symptoms. Lab review: orders written for new lab studies as appropriate; see orders. Depression Review: 
Patient is seen for followup of depression. Treatment includes Wellbutrin, Lexapro. Ongoing symptoms include none, feeling great. She denies depressed mood, insomnia and fatigue. She experiences the following side effects from the treatment: none. Asthma Review: 
The patient is being seen for follow up of reactive airway and allergies, not currently in exacerbation. Had follow up with pulmonary on last month. Symptoms occur: less than 2x month and with respiratory infections. Wheezing when present is described as mild and easily relieved with rescue bronchodilator. Current limitations in activity from asthma: none.   Frequency of use of quick-relief meds: prn. Regimen compliance: The patient reports adherence to asthma action plan and regimen. History Patient Active Problem List  
Diagnosis Code  Depression F32.9  Anxiety F41.9  Hypercholesterolemia E78.00  Renal artery stenosis (HCC) I70.1  History of hepatitis B Z86.19  
 Glucose intolerance (impaired glucose tolerance) R73.02  
 Essential hypertension, benign I10  
 Encounter for medication monitoring Z51.81  
 H/O atrial fibrillation without current medication Z86.79  
 Asthmatic bronchitis J45.909  Environmental and seasonal allergies J30.89  Severe obesity (BMI 35.0-39. 9) with comorbidity ([facility] [address] E66.01  
 
 
Current Outpatient Medications Medication Sig Dispense Refill  varicella-zoster recombinant, PF, (SHINGRIX) 50 mcg/0.5 mL susr injection 0.5 mL by IntraMUSCular route once for 1 dose. Repeat second dose in 6 months. 0.5 mL 1  
 montelukast (SINGULAIR) 10 mg tablet Take 10 mg by mouth daily.  losartan (COZAAR) 50 mg tablet Take 1 Tab by mouth daily. 90 Tab 3  pantoprazole (PROTONIX) 40 mg tablet Take 1 Tab by mouth daily. 90 Tab 3  
 metoprolol succinate (TOPROL-XL) 100 mg tablet Take 1 Tab by mouth daily. 90 Tab 3  
 escitalopram oxalate (LEXAPRO) 20 mg tablet Take 1 Tab by mouth daily. 90 Tab 3  
 buPROPion XL (WELLBUTRIN XL) 300 mg XL tablet Take 1 Tab by mouth every morning. 90 Tab 3  
 albuterol (PROVENTIL VENTOLIN) 2.5 mg /3 mL (0.083 %) nebulizer solution 3 mL by Nebulization route every four (4) hours as needed for Wheezing. 24 Each 11  
 albuterol (PROVENTIL HFA, VENTOLIN HFA, PROAIR HFA) 90 mcg/actuation inhaler Take 1-2 Puffs by inhalation every four (4) hours as needed for Wheezing. 1 Inhaler 11  
 desonide (DESOWEN) 0.05 % topical ointment daily as needed  2  
 fluorouracil (EFUDEX) 5 % chemo cream daily as needed. 0  
 nystatin (MYCOSTATIN) topical cream Apply  to affected area daily as needed.   6  
  triamcinolone acetonide (KENALOG) 0.1 % ointment Apply  to affected area daily as needed. 11  
 fluticasone (FLONASE) 50 mcg/actuation nasal spray 2 Sprays by Both Nostrils route daily as needed for Rhinitis. 3 Bottle 3  
 cinnamon bark (CINNAMON) 500 mg cap Take 500 mg by mouth two (2) times a day.  KRILL OIL PO Take 300 mg by mouth daily.  Cholecalciferol, Vitamin D3, (VITAMIN D) 2,000 unit Cap Take 1 Tab by mouth daily.  MULTIVITAMIN PO Take 1 Tab by mouth daily. Allergies Allergen Reactions  Iodinated Contrast- Oral And Iv Dye Swelling  Clindamycin Nausea and Vomiting  Codeine Other (comments) Headaches  Morphine Other (comments)  
  headache Past Medical History:  
Diagnosis Date  Anxiety  Arrhythmia   
 hx A-Fib  Arthritis   
 knees  Asthma  Depression  History of hepatitis B   
 Hypercholesterolemia  Hypertension  Renal artery stenosis ([facility] [address] 2006 Past Surgical History:  
Procedure Laterality Date  CARDIAC SURG PROCEDURE UNLIST  4/28/2015  
 cardiac ablation  HX BLEPHAROPLASTY  04/26/2016  HX COLONOSCOPY  05/20/2016  
 \" [address] [facility] [address]  HX KNEE REPLACEMENT  05/22/2018  
 right knee  ME COLONOSCOPY FLX DX W/COLLJ SPEC WHEN PFRMD  12/01/2010  
 dr Junie Roque Family History Problem Relation Age of Onset  Cancer Mother   
     multiple sites  Cancer Father   
     lung  Heart Disease Father  Heart Disease Brother  Heart Attack Brother  Lung Disease Brother COPD  No Known Problems Sister  Hypertension Brother Social History Tobacco Use  Smoking status: Never Smoker  Smokeless tobacco: Never Used Substance Use Topics  Alcohol use: Yes Alcohol/week: 0.0 oz  
  Comment: socially Lab Results Component Value Date/Time  WBC 8.8 09/05/2017 09:30 AM  
 HGB 14.9 09/05/2017 09:30 AM  
 HCT 44.2 09/05/2017 09:30 AM  
 PLATELET 210 99/22/5112 09:30 AM  
 MCV 93 09/05/2017 09:30 AM  
 
Lab Results Component Value Date/Time Cholesterol, total 259 (H) 09/05/2017 09:30 AM  
 HDL Cholesterol 53 09/05/2017 09:30 AM  
 LDL, calculated 167 (H) 09/05/2017 09:30 AM  
 Triglyceride 195 (H) 09/05/2017 09:30 AM  
 
Lab Results Component Value Date/Time TSH 3.880 10/26/2016 07:50 AM  
 T4, Free 1.14 01/28/2015 02:43 PM  
  
Lab Results Component Value Date/Time Sodium 138 09/05/2017 09:30 AM  
 Potassium 4.5 09/05/2017 09:30 AM  
 Chloride 97 09/05/2017 09:30 AM  
 CO2 24 09/05/2017 09:30 AM  
 Glucose 125 (H) 09/05/2017 09:30 AM  
 BUN 17 09/05/2017 09:30 AM  
 Creatinine 0.92 09/05/2017 09:30 AM  
 BUN/Creatinine ratio 18 09/05/2017 09:30 AM  
 GFR est AA 75 09/05/2017 09:30 AM  
 GFR est non-AA 65 09/05/2017 09:30 AM  
 Calcium 9.5 09/05/2017 09:30 AM  
 Bilirubin, total 0.3 09/05/2017 09:30 AM  
 ALT (SGPT) 25 09/05/2017 09:30 AM  
 AST (SGOT) 17 09/05/2017 09:30 AM  
 Alk. phosphatase 102 09/05/2017 09:30 AM  
 Protein, total 6.8 09/05/2017 09:30 AM  
 Albumin 4.0 09/05/2017 09:30 AM  
 A-G Ratio 1.4 09/05/2017 09:30 AM  
  
Lab Results Component Value Date/Time Hemoglobin A1c 5.7 (H) 03/27/2013 01:20 PM  
 Hemoglobin A1c (POC) 6.1 09/05/2017 09:30 AM  
   
 
Depression Risk Factor Screening: PHQ over the last two weeks 6/20/2017 Little interest or pleasure in doing things Not at all Feeling down, depressed or hopeless Not at all Total Score PHQ 2 0 Alcohol Risk Factor Screening: On any occasion in the past three months you have had more than 3 drinks containing alcohol. YES Functional Ability and Level of Safety:  
 
Hearing Loss  
none Activities of Daily Living Self-care. Requires assistance with: no ADLs Fall Risk Fall Risk Assessment, last 12 mths 6/20/2017 Able to walk? Yes Fall in past 12 months? No  
 
Functional Ability:  
Does the patient exhibit a steady gait? yes How long did it take the patient to get up and walk from a sitting position? seconds Is the patient self reliant? (ie can do own laundry, meals, household chores)  yes Does the patient handle his/her own medications? yes Does the patient handle his/her own money? yes Is the patients home safe (ie good lighting, handrails on stairs and bath, etc.)? yes Did you notice or did patient express any hearing difficulties? no  
Did you notice or did patient express any vision difficulties? no  
  
 
Advance Care Planning:  
Patient was offered the opportunity to discuss advance care planning:  yes Does patient have an Advance Directive:  no If no, did you provide information on Caring Connections? yes Abuse Screen Patient is not abused Review of Systems Constitutional: negative for fatigue and malaise Eyes: negative for irritation and redness Ears, nose, mouth, throat, and face: negative for earaches, nasal congestion and hoarseness Respiratory: negative for cough, sputum or dyspnea on exertion Cardiovascular: negative for chest pain, chest pressure/discomfort, dyspnea, palpitations, irregular heart beats, fatigue, lower extremity edema Gastrointestinal: negative for dysphagia, dyspepsia, reflux symptoms, nausea, vomiting, change in bowel habits, melena, constipation and abdominal pain Genitourinary:negative for frequency, dysuria, nocturia, urinary incontinence Integument/breast: negative for rash and dryness Hematologic/lymphatic: negative for easy bruising and lymphadenopathy Musculoskeletal:negative for myalgias, arthralgias, stiff joints, neck pain, back pain and muscle weakness Neurological: negative for headaches, dizziness, tremor and weakness Endocrine: negative for diabetic symptoms including polyuria and polydipsia Physical Examination Evaluation of Cognitive Function: 
Mood/affect:  neutral 
Appearance: age appropriate Family member/caregiver input: NA 
 
 Visit Vitals /80 Pulse 64 Temp 97.4 °F (36.3 °C) (Oral) Resp 16 Ht 5' 8\" (1.727 m) Wt 231 lb 9.6 oz (105.1 kg) SpO2 96% BMI 35.21 kg/m² General:  Alert, cooperative, no distress, appears stated age. Head:  Normocephalic, without obvious abnormality, atraumatic. Ears:  Normal TMs and external ear canals both ears. Nose: Nares normal. Septum midline. Mucosa normal. No drainage or sinus tenderness. Throat: Lips, mucosa, and tongue normal. Teeth and gums normal.  
Neck: Supple, symmetrical, trachea midline, no adenopathy, thyroid: no enlargement/tenderness/nodules, no carotid bruit and no JVD. Back:   Symmetric, no curvature. ROM normal. No CVA tenderness. Lungs:   Clear to auscultation bilaterally. Chest wall:  No tenderness or deformity. Heart:  Regular rate and rhythm, S1, S2 normal, no murmur, click, rub or gallop. Breast Exam:  Deferred. Done by GYN Abdomen:   Soft, non-tender. Bowel sounds normal. No masses,  No organomegaly. Genitalia:  Deferred. Done by GYN Rectal:  Deferred. Done by GYN. Extremities: Extremities normal, atraumatic, no cyanosis or edema. Pulses: 2+ and symmetric all extremities. Skin: Skin color, texture, turgor normal. No rashes or lesions. Lymph nodes: Cervical, supraclavicular, and axillary nodes normal.  
Neurologic: CNII-XII intact. Normal strength, sensation and reflexes throughout. Patient Care Team: 
Marcella Watson MD as PCP - Gabino Nieto MD as Physician (Sleep Medicine) Carline Chamberlain MD as Consulting Provider (Endocrinology) Advice/Referrals/Counseling Education and counseling provided: 
Are appropriate based on today's review and evaluation End-of-Life planning (with patient's consent) Assessment/Plan ASSESSMENT and PLAN Diagnoses and all orders for this visit: 
 
1. Medicare annual wellness visit, subsequent 2. Essential hypertension, benign 3. Hypercholesterolemia -     LIPID PANEL 4. Glucose intolerance (impaired glucose tolerance) -     AMB POC HEMOGLOBIN A1C 
 
5. Paroxysmal SVT (supraventricular tachycardia) (HCC) 6. Depression, unspecified depression type 7. Environmental and seasonal allergies 8. Reactive airway disease that is not asthma 9. Gastroesophageal reflux disease without esophagitis 10. Encounter for medication monitoring -     METABOLIC PANEL, COMPREHENSIVE 
-     CBC W/O DIFF 11. Need for shingles vaccine 
-     varicella-zoster recombinant, PF, (SHINGRIX) 50 mcg/0.5 mL susr injection; 0.5 mL by IntraMUSCular route once for 1 dose. Repeat second dose in 6 months. 12. Non morbid obesity Follow-up Disposition: 
Return in about 6 months (around 4/23/2019). reviewed diet, exercise and weight control 
cardiovascular risk and specific lipid/LDL goals reviewed 
reviewed medications and side effects in detail 
specific diabetic recommendations: low cholesterol diet, weight control and daily exercise discussed and glycohemoglobin and other lab monitoring discussed I have discussed diagnosis listed in this note with pt and/or family. I have discussed treatment plans and options and the risk/benefit analysis of those options, including safe use of medications and possible medication side effects. Through the use of shared decision making we have agreed to the above plan. The patient has received an after-visit summary and questions were answered concerning future plans and follow up. Advise pt of any urgent changes then to proceed to the ER.

## 2018-10-23 NOTE — PATIENT INSTRUCTIONS
Wt Readings from Last 3 Encounters:  
10/23/18 231 lb 9.6 oz (105.1 kg) 05/15/18 254 lb (115.2 kg) 10/16/17 256 lb 3.2 oz (116.2 kg)

## 2018-10-23 NOTE — LETTER
10/24/2018 9:54 AM 
 
Ms. Ivory Restrepo [address] [facility] [address] Dear Ivory Restrepo: 
 
Please find your most recent results below. Resulted Orders METABOLIC PANEL, COMPREHENSIVE Result Value Ref Range Glucose 100 (H) 65 - 99 mg/dL BUN 10 8 - 27 mg/dL Creatinine 0.84 0.57 - 1.00 mg/dL GFR est non-AA 72 >59 mL/min/1.73 GFR est AA 83 >59 mL/min/1.73  
 BUN/Creatinine ratio 12 12 - 28 Sodium 142 134 - 144 mmol/L Potassium 4.1 3.5 - 5.2 mmol/L Chloride 101 96 - 106 mmol/L  
 CO2 25 20 - 29 mmol/L Calcium 9.5 8.7 - 10.3 mg/dL Protein, total 6.6 6.0 - 8.5 g/dL Albumin 4.2 3.6 - 4.8 g/dL GLOBULIN, TOTAL 2.4 1.5 - 4.5 g/dL A-G Ratio 1.8 1.2 - 2.2 Bilirubin, total 0.4 0.0 - 1.2 mg/dL Alk. phosphatase 106 39 - 117 IU/L  
 AST (SGOT) 19 0 - 40 IU/L  
 ALT (SGPT) 22 0 - 32 IU/L Narrative Performed at:  [address]  [id number] : Haroon Ku MD, Phone:  [phone] LIPID PANEL Result Value Ref Range Cholesterol, total 241 (H) 100 - 199 mg/dL Triglyceride 135 0 - 149 mg/dL HDL Cholesterol 47 >39 mg/dL VLDL, calculated 27 5 - 40 mg/dL LDL, calculated 167 (H) 0 - 99 mg/dL Narrative Performed at:  [address]  [id number] : Haroon Ku MD, Phone:  [phone] AMB POC HEMOGLOBIN A1C Result Value Ref Range Hemoglobin A1c (POC) 5.4 % Narrative Hemoglobin A1c Increased risk for diabetes: 5.7-6.4% Diabetes >6.4% Glycemic control for diabetics: <7.0% [facility] [address] CBC W/O DIFF Result Value Ref Range WBC 6.5 3.4 - 10.8 x10E3/uL  
 RBC 4.48 3.77 - 5.28 x10E6/uL HGB 14.0 11.1 - 15.9 g/dL HCT 42.1 34.0 - 46.6 % MCV 94 79 - 97 fL  
 MCH 31.3 26.6 - 33.0 pg  
 MCHC 33.3 31.5 - 35.7 g/dL  
 RDW 14.4 12.3 - 15.4 % PLATELET 584 600 - 566 x10E3/uL Narrative Performed at:  [place] [address], [place], [place]  [id number] : Anne-Marie Berry MD, Phone:  [phone] RECOMMENDATIONS: 
Work on diet and exercise to help lower the cholesterol level. Please plan to repeat this test in 3 months. If your level to does not come down, we may need to discuss further starting  medication. Elevated cholesterol is a risk factor for blockages in your arteries that could lead to a stroke or heart attack. Please call me if you have any questions: [phone] Sincerely, Moni Guadarrama MD

## 2018-10-24 LAB
ALBUMIN SERPL-MCNC: 4.2 G/DL (ref 3.6–4.8)
ALBUMIN/GLOB SERPL: 1.8 {RATIO} (ref 1.2–2.2)
ALP SERPL-CCNC: 106 IU/L (ref 39–117)
ALT SERPL-CCNC: 22 IU/L (ref 0–32)
AST SERPL-CCNC: 19 IU/L (ref 0–40)
BILIRUB SERPL-MCNC: 0.4 MG/DL (ref 0–1.2)
BUN SERPL-MCNC: 10 MG/DL (ref 8–27)
BUN/CREAT SERPL: 12 (ref 12–28)
CALCIUM SERPL-MCNC: 9.5 MG/DL (ref 8.7–10.3)
CHLORIDE SERPL-SCNC: 101 MMOL/L (ref 96–106)
CHOLEST SERPL-MCNC: 241 MG/DL (ref 100–199)
CO2 SERPL-SCNC: 25 MMOL/L (ref 20–29)
CREAT SERPL-MCNC: 0.84 MG/DL (ref 0.57–1)
ERYTHROCYTE [DISTWIDTH] IN BLOOD BY AUTOMATED COUNT: 14.4 % (ref 12.3–15.4)
GLOBULIN SER CALC-MCNC: 2.4 G/DL (ref 1.5–4.5)
GLUCOSE SERPL-MCNC: 100 MG/DL (ref 65–99)
HCT VFR BLD AUTO: 42.1 % (ref 34–46.6)
HDLC SERPL-MCNC: 47 MG/DL
HGB BLD-MCNC: 14 G/DL (ref 11.1–15.9)
INTERPRETATION, 910389: NORMAL
LDLC SERPL CALC-MCNC: 167 MG/DL (ref 0–99)
MCH RBC QN AUTO: 31.3 PG (ref 26.6–33)
MCHC RBC AUTO-ENTMCNC: 33.3 G/DL (ref 31.5–35.7)
MCV RBC AUTO: 94 FL (ref 79–97)
PLATELET # BLD AUTO: 273 X10E3/UL (ref 150–379)
POTASSIUM SERPL-SCNC: 4.1 MMOL/L (ref 3.5–5.2)
PROT SERPL-MCNC: 6.6 G/DL (ref 6–8.5)
RBC # BLD AUTO: 4.48 X10E6/UL (ref 3.77–5.28)
SODIUM SERPL-SCNC: 142 MMOL/L (ref 134–144)
TRIGL SERPL-MCNC: 135 MG/DL (ref 0–149)
VLDLC SERPL CALC-MCNC: 27 MG/DL (ref 5–40)
WBC # BLD AUTO: 6.5 X10E3/UL (ref 3.4–10.8)

## 2019-03-23 DIAGNOSIS — F32.A DEPRESSION, UNSPECIFIED DEPRESSION TYPE: ICD-10-CM

## 2019-03-24 RX ORDER — ESCITALOPRAM OXALATE 20 MG/1
TABLET ORAL
Qty: 90 TAB | Refills: 3 | Status: SHIPPED | OUTPATIENT
Start: 2019-03-24 | End: 2020-03-19 | Stop reason: SDUPTHER

## 2019-03-24 RX ORDER — LOSARTAN POTASSIUM 50 MG/1
TABLET ORAL
Qty: 90 TAB | Refills: 3 | Status: SHIPPED | OUTPATIENT
Start: 2019-03-24 | End: 2020-03-19 | Stop reason: SDUPTHER

## 2019-03-24 RX ORDER — BUPROPION HYDROCHLORIDE 300 MG/1
TABLET ORAL
Qty: 90 TAB | Refills: 3 | Status: SHIPPED | OUTPATIENT
Start: 2019-03-24 | End: 2020-03-19 | Stop reason: SDUPTHER

## 2019-04-05 DIAGNOSIS — K21.9 GASTROESOPHAGEAL REFLUX DISEASE WITHOUT ESOPHAGITIS: ICD-10-CM

## 2019-04-05 RX ORDER — PANTOPRAZOLE SODIUM 40 MG/1
40 TABLET, DELAYED RELEASE ORAL DAILY
Qty: 90 TAB | Refills: 3 | Status: SHIPPED | OUTPATIENT
Start: 2019-04-05 | End: 2019-04-30 | Stop reason: SDUPTHER

## 2019-04-05 NOTE — TELEPHONE ENCOUNTER
Spoke with patient and appt has been made for 4/30/2019 at 9:45am. Patient accepted appt.     Patient would like Protonix to [facility]

## 2019-04-05 NOTE — TELEPHONE ENCOUNTER
Patient would like to see Lamar Cartagena soon for medicine check and she also needs a RX refill pantoprazole (PROTONIX) 40 mg tablet she can be reached @ [phone]

## 2019-04-30 ENCOUNTER — OFFICE VISIT (OUTPATIENT)
Dept: FAMILY MEDICINE CLINIC | Age: 69
End: 2019-04-30

## 2019-04-30 ENCOUNTER — HOSPITAL ENCOUNTER (OUTPATIENT)
Dept: LAB | Age: 69
Discharge: HOME | End: 2019-04-30
Payer: MEDICARE

## 2019-04-30 VITALS
SYSTOLIC BLOOD PRESSURE: 138 MMHG | OXYGEN SATURATION: 98 % | BODY MASS INDEX: 36.07 KG/M2 | WEIGHT: 238 LBS | DIASTOLIC BLOOD PRESSURE: 82 MMHG | HEART RATE: 71 BPM | TEMPERATURE: 98.6 F | HEIGHT: 68 IN | RESPIRATION RATE: 21 BRPM

## 2019-04-30 DIAGNOSIS — I47.1 PAROXYSMAL SVT (SUPRAVENTRICULAR TACHYCARDIA) (HCC): ICD-10-CM

## 2019-04-30 DIAGNOSIS — F32.A DEPRESSION, UNSPECIFIED DEPRESSION TYPE: ICD-10-CM

## 2019-04-30 DIAGNOSIS — E78.00 HYPERCHOLESTEROLEMIA: ICD-10-CM

## 2019-04-30 DIAGNOSIS — K21.9 GASTROESOPHAGEAL REFLUX DISEASE WITHOUT ESOPHAGITIS: ICD-10-CM

## 2019-04-30 DIAGNOSIS — R73.02 GLUCOSE INTOLERANCE (IMPAIRED GLUCOSE TOLERANCE): ICD-10-CM

## 2019-04-30 DIAGNOSIS — Z51.81 ENCOUNTER FOR MEDICATION MONITORING: ICD-10-CM

## 2019-04-30 DIAGNOSIS — I10 ESSENTIAL HYPERTENSION, BENIGN: Primary | ICD-10-CM

## 2019-04-30 DIAGNOSIS — J30.89 ENVIRONMENTAL AND SEASONAL ALLERGIES: ICD-10-CM

## 2019-04-30 LAB
GLUCOSE POC: 106 MG/DL
HBA1C MFR BLD HPLC: 5.8 %

## 2019-04-30 PROCEDURE — 36415 COLL VENOUS BLD VENIPUNCTURE: CPT

## 2019-04-30 PROCEDURE — 80061 LIPID PANEL: CPT

## 2019-04-30 PROCEDURE — 80053 COMPREHEN METABOLIC PANEL: CPT

## 2019-04-30 RX ORDER — BUDESONIDE AND FORMOTEROL FUMARATE DIHYDRATE 160; 4.5 UG/1; UG/1
2 AEROSOL RESPIRATORY (INHALATION)
COMMUNITY
End: 2020-03-19 | Stop reason: SDUPTHER

## 2019-04-30 RX ORDER — NEOMYCIN/POLYMYXIN B/PRAMOXINE 3.5-10K-1
1 CREAM (GRAM) TOPICAL DAILY
COMMUNITY
End: 2020-06-12 | Stop reason: ALTCHOICE

## 2019-04-30 RX ORDER — METOPROLOL SUCCINATE 100 MG/1
100 TABLET, EXTENDED RELEASE ORAL DAILY
Qty: 90 TAB | Refills: 3 | Status: SHIPPED | OUTPATIENT
Start: 2019-04-30 | End: 2020-03-19 | Stop reason: SDUPTHER

## 2019-04-30 RX ORDER — PREDNISONE 10 MG/1
TABLET ORAL
Qty: 50 TAB | Refills: 1 | Status: SHIPPED | OUTPATIENT
Start: 2019-04-30 | End: 2019-12-11

## 2019-04-30 RX ORDER — PANTOPRAZOLE SODIUM 40 MG/1
40 TABLET, DELAYED RELEASE ORAL DAILY
Qty: 90 TAB | Refills: 3 | Status: SHIPPED | OUTPATIENT
Start: 2019-04-30 | End: 2020-03-19 | Stop reason: SDUPTHER

## 2019-04-30 NOTE — PROGRESS NOTES
HISTORY OF PRESENT ILLNESS Ursula Palacio is a 80 y.o. female. HPI Follow up on chronic medical problems. Overall feeling well. Has no complaints noted today. Cardiovascular Review: 
The patient has hypertension, hyperlipidemia and obesity. H/o PAF and SVT. S/p ablation by cardiology on last month. Diet and Lifestyle: generally follows a low fat low cholesterol diet, generally follows a low sodium diet, follows a diabetic diet regularly, exercises regularly, attended class at the [facility] which was helpful. Home BP Monitoring: is not measured at home. Pertinent ROS: taking medications as instructed, no medication side effects noted, no TIA's, no chest pain on exertion, no dyspnea on exertion, no swelling of ankles. Diabetes Mellitus: 
She has glucose intolerance. Diabetic ROS - diabetic diet compliance: compliant most of the time, further diabetic ROS: no polyuria or polydipsia, no chest pain, dyspnea or TIA's, no numbness, tingling or pain in extremities, no unusual visual symptoms. Lab review: orders written for new lab studies as appropriate; see orders. Depression Review: 
Patient is seen for followup of depression. Treatment includes Wellbutrin, Lexapro. Ongoing symptoms include none, feeling great. She denies depressed mood, insomnia and fatigue. She experiences the following side effects from the treatment: none. Patient Active Problem List  
Diagnosis Code  Depression F32.9  Anxiety F41.9  Hypercholesterolemia E78.00  Renal artery stenosis (HCC) I70.1  History of hepatitis B Z86.19  
 Glucose intolerance (impaired glucose tolerance) R73.02  
 Essential hypertension, benign I10  
 Encounter for medication monitoring Z51.81  
 H/O atrial fibrillation without current medication Z86.79  
 Asthmatic bronchitis J45.909  Environmental and seasonal allergies J30.89  Severe obesity (BMI 35.0-39. 9) with comorbidity ([facility] [address] E66.01  
 
 
 Current Outpatient Medications Medication Sig Dispense Refill  pantoprazole (PROTONIX) 40 mg tablet Take 1 Tab by mouth daily. 90 Tab 3  
 losartan (COZAAR) 50 mg tablet TAKE 1 TABLET DAILY 90 Tab 3  
 escitalopram oxalate (LEXAPRO) 20 mg tablet TAKE 1 TABLET DAILY 90 Tab 3  
 buPROPion XL (WELLBUTRIN XL) 300 mg XL tablet TAKE 1 TABLET EVERY MORNING 90 Tab 3  predniSONE (STERAPRED DS) 10 mg dose pack See administration instruction per 10mg dose pack 21 Tab 0  chlorpheniramine-HYDROcodone (TUSSIONEX) 10-8 mg/5 mL suspension Take 5 mL by mouth every twelve (12) hours as needed for Cough. Max Daily Amount: 10 mL. 180 mL 0  
 montelukast (SINGULAIR) 10 mg tablet Take 10 mg by mouth daily.  metoprolol succinate (TOPROL-XL) 100 mg tablet Take 1 Tab by mouth daily. 90 Tab 3  
 albuterol (PROVENTIL VENTOLIN) 2.5 mg /3 mL (0.083 %) nebulizer solution 3 mL by Nebulization route every four (4) hours as needed for Wheezing. 24 Each 11  
 albuterol (PROVENTIL HFA, VENTOLIN HFA, PROAIR HFA) 90 mcg/actuation inhaler Take 1-2 Puffs by inhalation every four (4) hours as needed for Wheezing. 1 Inhaler 11  
 desonide (DESOWEN) 0.05 % topical ointment daily as needed  2  
 fluorouracil (EFUDEX) 5 % chemo cream daily as needed. 0  
 nystatin (MYCOSTATIN) topical cream Apply  to affected area daily as needed. 11  
 triamcinolone acetonide (KENALOG) 0.1 % ointment Apply  to affected area daily as needed. 11  
 fluticasone (FLONASE) 50 mcg/actuation nasal spray 2 Sprays by Both Nostrils route daily as needed for Rhinitis. 3 Bottle 3  
 cinnamon bark (CINNAMON) 500 mg cap Take 500 mg by mouth two (2) times a day.  KRILL OIL PO Take 300 mg by mouth daily.  Cholecalciferol, Vitamin D3, (VITAMIN D) 2,000 unit Cap Take 1 Tab by mouth daily.  MULTIVITAMIN PO Take 1 Tab by mouth daily. Allergies Allergen Reactions  Iodinated Contrast- Oral And Iv Dye Swelling  Clindamycin Nausea and Vomiting  Codeine Other (comments) Headaches  Morphine Other (comments)  
  headache Past Medical History:  
Diagnosis Date  Anxiety  Arrhythmia   
 hx A-Fib  Arthritis   
 knees  Asthma  Depression  History of hepatitis B   
 Hypercholesterolemia  Hypertension  Renal artery stenosis ([address] 2006 Past Surgical History:  
Procedure Laterality Date  CARDIAC SURG PROCEDURE UNLIST  4/28/2015  
 cardiac ablation  HX BLEPHAROPLASTY  04/26/2016  HX COLONOSCOPY  05/20/2016  
 \" [address]  HX KNEE REPLACEMENT  05/22/2018  
 right knee  [place] COLONOSCOPY FLX DX W/COLLJ SPEC WHEN PFRMD  12/01/2010  
 dr Leanne Stafford Family History Problem Relation Age of Onset  Cancer Mother   
     multiple sites  Cancer Father   
     lung  Heart Disease Father  Heart Disease Brother  Heart Attack Brother  Lung Disease Brother COPD  No Known Problems Sister  Hypertension Brother Social History Tobacco Use  Smoking status: Never Smoker  Smokeless tobacco: Never Used Substance Use Topics  Alcohol use: Yes Alcohol/week: 0.0 oz  
  Comment: socially Lab Results Component Value Date/Time WBC 6.5 10/23/2018 11:31 AM  
 HGB 14.0 10/23/2018 11:31 AM  
 HCT 42.1 10/23/2018 11:31 AM  
 PLATELET 229 35/89/7153 11:31 AM  
 MCV 94 10/23/2018 11:31 AM  
 
 
Lab Results Component Value Date/Time Cholesterol, total 241 (H) 10/23/2018 11:31 AM  
 HDL Cholesterol 47 10/23/2018 11:31 AM  
 LDL, calculated 167 (H) 10/23/2018 11:31 AM  
 Triglyceride 135 10/23/2018 11:31 AM  
 
 
Lab Results Component Value Date/Time ALT (SGPT) 22 10/23/2018 11:31 AM  
 AST (SGOT) 19 10/23/2018 11:31 AM  
 Alk. phosphatase 106 10/23/2018 11:31 AM  
 Bilirubin, direct 0.1 03/26/2012 07:30 AM  
 Bilirubin, total 0.4 10/23/2018 11:31 AM  
 
 
Lab Results Component Value Date/Time GFR est AA 83 10/23/2018 11:31 AM  
 GFR est non-AA 72 10/23/2018 11:31 AM  
 Creatinine 0.84 10/23/2018 11:31 AM  
 BUN 10 10/23/2018 11:31 AM  
 Sodium 142 10/23/2018 11:31 AM  
 Potassium 4.1 10/23/2018 11:31 AM  
 Chloride 101 10/23/2018 11:31 AM  
 CO2 25 10/23/2018 11:31 AM  
  
Lab Results Component Value Date/Time TSH 3.880 10/26/2016 07:50 AM  
 T4, Free 1.14 01/28/2015 02:43 PM  
  
Lab Results Component Value Date/Time Hemoglobin A1c 5.7 (H) 03/27/2013 01:20 PM  
 Hemoglobin A1c (POC) 5.4 10/23/2018 11:31 AM  
 
  
 
Review of Systems Constitutional: Negative for malaise/fatigue. HENT: Negative for congestion. Eyes: Negative for blurred vision. Respiratory: Negative for cough and shortness of breath. Cardiovascular: Negative for chest pain, palpitations and leg swelling. Gastrointestinal: Negative for abdominal pain, constipation and heartburn. Genitourinary: Negative for dysuria, frequency and urgency. Musculoskeletal: Negative for back pain and joint pain. Neurological: Negative for dizziness, tingling and headaches. Endo/Heme/Allergies: Positive for environmental allergies (has had some intermittent wheezing but overall has been stable. ). Psychiatric/Behavioral: Negative for depression. The patient does not have insomnia. Physical Exam  
Constitutional: She appears well-developed and well-nourished. /82   Pulse 71   Temp 98.6 °F (37 °C) (Oral)   Resp 21   Ht 5' 8\" (1.727 m)   Wt 238 lb (108 kg)   SpO2 98%   BMI 36.19 kg/m² HENT:  
Right Ear: Tympanic membrane and ear canal normal.  
Left Ear: Tympanic membrane and ear canal normal.  
Nose: No mucosal edema or rhinorrhea. Mouth/Throat: Oropharynx is clear and moist and mucous membranes are normal.  
Neck: Normal range of motion. Neck supple. No thyromegaly present. Cardiovascular: Normal rate and regular rhythm. No murmur heard. Pulmonary/Chest: Effort normal and breath sounds normal.  
Abdominal: Soft. Bowel sounds are normal. There is no tenderness. Musculoskeletal: Normal range of motion. She exhibits no edema. Lymphadenopathy:  
  She has no cervical adenopathy. Skin: Skin is warm and dry. Psychiatric: She has a normal mood and affect. Nursing note and vitals reviewed. ASSESSMENT and PLAN Diagnoses and all orders for this visit: 1. Essential hypertension, benign Discussed sodium restriction, high k rich diet, maintaining ideal body weight and regular exercise program such as daily walking 30 min perday 4-5 times per week, as physiologic means to achieve blood pressure control.  Medication compliance advised. 2. Hypercholesterolemia -     LIPID PANEL 3. Glucose intolerance (impaired glucose tolerance) a1c level is stable at 5.9% 
-     AMB POC HEMOGLOBIN A1C 
-     AMB POC GLUCOSE, QUANTITATIVE, BLOOD 4. Paroxysmal SVT (supraventricular tachycardia) (HCC) -     Refill metoprolol succinate (TOPROL-XL) 100 mg tablet; Take 1 Tab by mouth daily. 5. Depression, unspecified depression type Stable 6. Encounter for medication monitoring -     METABOLIC PANEL, COMPREHENSIVE 7. Environmental and seasonal allergies -     predniSONE (DELTASONE) 10 mg tablet; Take twice a day as directed. rx given to take with her on vacation in June in case of increased sx while out of the country. 8. Gastroesophageal reflux disease without esophagitis -    Refill pantoprazole (PROTONIX) 40 mg tablet; Take 1 Tab by mouth daily. Follow-up and Dispositions · Return in about 6 months (around 10/30/2019) for medicare wellness exam. 
  
 
current treatment plan is effective, no change in therapy 
reviewed diet, exercise and weight control 
cardiovascular risk and specific lipid/LDL goals reviewed 
reviewed medications and side effects in detail specific diabetic recommendations: low cholesterol diet, weight control and daily exercise discussed and glycohemoglobin and other lab monitoring discussed I have discussed diagnosis listed in this note with pt and/or family. I have discussed treatment plans and options and the risk/benefit analysis of those options, including safe use of medications and possible medication side effects. Through the use of shared decision making we have agreed to the above plan. The patient has received an after-visit summary and questions were answered concerning future plans and follow up. Advise pt of any urgent changes then to proceed to the ER.

## 2019-05-01 LAB
ALBUMIN SERPL-MCNC: 4.1 G/DL (ref 3.6–4.8)
ALBUMIN/GLOB SERPL: 1.6 {RATIO} (ref 1.2–2.2)
ALP SERPL-CCNC: 113 IU/L (ref 39–117)
ALT SERPL-CCNC: 22 IU/L (ref 0–32)
AST SERPL-CCNC: 21 IU/L (ref 0–40)
BILIRUB SERPL-MCNC: 0.3 MG/DL (ref 0–1.2)
BUN SERPL-MCNC: 12 MG/DL (ref 8–27)
BUN/CREAT SERPL: 13 (ref 12–28)
CALCIUM SERPL-MCNC: 9.6 MG/DL (ref 8.7–10.3)
CHLORIDE SERPL-SCNC: 101 MMOL/L (ref 96–106)
CHOLEST SERPL-MCNC: 261 MG/DL (ref 100–199)
CO2 SERPL-SCNC: 25 MMOL/L (ref 20–29)
CREAT SERPL-MCNC: 0.91 MG/DL (ref 0.57–1)
GLOBULIN SER CALC-MCNC: 2.6 G/DL (ref 1.5–4.5)
GLUCOSE SERPL-MCNC: 124 MG/DL (ref 65–99)
HDLC SERPL-MCNC: 56 MG/DL
INTERPRETATION, 910389: NORMAL
LDLC SERPL CALC-MCNC: 185 MG/DL (ref 0–99)
POTASSIUM SERPL-SCNC: 4 MMOL/L (ref 3.5–5.2)
PROT SERPL-MCNC: 6.7 G/DL (ref 6–8.5)
SODIUM SERPL-SCNC: 142 MMOL/L (ref 134–144)
TRIGL SERPL-MCNC: 101 MG/DL (ref 0–149)
VLDLC SERPL CALC-MCNC: 20 MG/DL (ref 5–40)

## 2019-05-02 ENCOUNTER — TELEPHONE (OUTPATIENT)
Dept: FAMILY MEDICINE CLINIC | Age: 69
End: 2019-05-02

## 2019-05-02 RX ORDER — ROSUVASTATIN CALCIUM 10 MG/1
10 TABLET, COATED ORAL
Qty: 30 TAB | Refills: 3 | Status: SHIPPED | OUTPATIENT
Start: 2019-05-02 | End: 2019-08-12 | Stop reason: SDUPTHER

## 2019-05-02 RX ORDER — ROSUVASTATIN CALCIUM 10 MG/1
10 TABLET, COATED ORAL
Qty: 30 TAB | Refills: 3 | Status: SHIPPED | OUTPATIENT
Start: 2019-05-02 | End: 2019-05-02 | Stop reason: SDUPTHER

## 2019-05-02 RX ORDER — SCOLOPAMINE TRANSDERMAL SYSTEM 1 MG/1
1 PATCH, EXTENDED RELEASE TRANSDERMAL
Qty: 4 PATCH | Refills: 0 | Status: SHIPPED | OUTPATIENT
Start: 2019-05-02 | End: 2019-08-07

## 2019-05-03 ENCOUNTER — DOCUMENTATION ONLY (OUTPATIENT)
Dept: FAMILY MEDICINE CLINIC | Age: 69
End: 2019-05-03

## 2019-05-03 NOTE — PROGRESS NOTES
Prior auth for Transderm Scop 1.5 mg approved thru insurance at [phone] thru the end of the year. Patient notified.

## 2019-08-07 ENCOUNTER — OFFICE VISIT (OUTPATIENT)
Dept: FAMILY MEDICINE CLINIC | Age: 69
End: 2019-08-07

## 2019-08-07 ENCOUNTER — HOSPITAL ENCOUNTER (OUTPATIENT)
Dept: LAB | Age: 69
Discharge: HOME | End: 2019-08-07
Payer: MEDICARE

## 2019-08-07 VITALS
OXYGEN SATURATION: 97 % | TEMPERATURE: 97.4 F | HEIGHT: 68 IN | RESPIRATION RATE: 16 BRPM | DIASTOLIC BLOOD PRESSURE: 70 MMHG | BODY MASS INDEX: 35.92 KG/M2 | SYSTOLIC BLOOD PRESSURE: 129 MMHG | WEIGHT: 237 LBS | HEART RATE: 65 BPM

## 2019-08-07 DIAGNOSIS — E78.00 HYPERCHOLESTEROLEMIA: Primary | ICD-10-CM

## 2019-08-07 DIAGNOSIS — Z79.899 ENCOUNTER FOR LONG-TERM (CURRENT) USE OF MEDICATIONS: ICD-10-CM

## 2019-08-07 PROCEDURE — 36415 COLL VENOUS BLD VENIPUNCTURE: CPT

## 2019-08-07 PROCEDURE — 80076 HEPATIC FUNCTION PANEL: CPT

## 2019-08-07 PROCEDURE — 80061 LIPID PANEL: CPT

## 2019-08-07 PROCEDURE — 82550 ASSAY OF CK (CPK): CPT

## 2019-08-07 NOTE — PROGRESS NOTES
Chief Complaint   Patient presents with    Cholesterol Problem     lab only           1. Have you been to the ER, urgent care clinic since your last visit? Hospitalized since your last visit? No    2. Have you seen or consulted any other health care providers outside of the [address] [place] [address] since your last visit? Include any pap smears or colon screening.  No

## 2019-08-08 ENCOUNTER — TELEPHONE (OUTPATIENT)
Dept: FAMILY MEDICINE CLINIC | Age: 69
End: 2019-08-08

## 2019-08-08 LAB
ALBUMIN SERPL-MCNC: 4.1 G/DL (ref 3.6–4.8)
ALP SERPL-CCNC: 110 IU/L (ref 39–117)
ALT SERPL-CCNC: 25 IU/L (ref 0–32)
AST SERPL-CCNC: 22 IU/L (ref 0–40)
BILIRUB DIRECT SERPL-MCNC: 0.08 MG/DL (ref 0–0.4)
BILIRUB SERPL-MCNC: 0.3 MG/DL (ref 0–1.2)
CHOLEST SERPL-MCNC: 186 MG/DL (ref 100–199)
CK SERPL-CCNC: 57 U/L (ref 24–173)
HDLC SERPL-MCNC: 55 MG/DL
INTERPRETATION, 910389: NORMAL
LDLC SERPL CALC-MCNC: 98 MG/DL (ref 0–99)
PROT SERPL-MCNC: 6.9 G/DL (ref 6–8.5)
TRIGL SERPL-MCNC: 165 MG/DL (ref 0–149)
VLDLC SERPL CALC-MCNC: 33 MG/DL (ref 5–40)

## 2019-08-12 RX ORDER — ROSUVASTATIN CALCIUM 10 MG/1
10 TABLET, COATED ORAL
Qty: 90 TAB | Refills: 3 | Status: SHIPPED | OUTPATIENT
Start: 2019-08-12 | End: 2020-06-12 | Stop reason: ALTCHOICE

## 2019-08-12 NOTE — TELEPHONE ENCOUNTER
Patient is requesting a RX refill she would like to get a 90 day supply rosuvastatin (CRESTOR) 10 mg tablet she can be reached @ [phone]

## 2019-12-06 DIAGNOSIS — J98.9 REACTIVE AIRWAY DISEASE THAT IS NOT ASTHMA: Primary | ICD-10-CM

## 2019-12-06 RX ORDER — HYDROCODONE POLISTIREX AND CHLORPHENIRAMINE POLISTIREX 10; 8 MG/5ML; MG/5ML
5 SUSPENSION, EXTENDED RELEASE ORAL
Qty: 180 ML | Refills: 0 | Status: SHIPPED | OUTPATIENT
Start: 2019-12-06 | End: 2019-12-11

## 2019-12-06 RX ORDER — PREDNISONE 10 MG/1
TABLET ORAL
Qty: 50 TAB | Refills: 0 | Status: SHIPPED | OUTPATIENT
Start: 2019-12-06 | End: 2020-06-12 | Stop reason: ALTCHOICE

## 2019-12-06 NOTE — TELEPHONE ENCOUNTER
----- Message from Ailintommie Markman sent at 12/6/2019 10:21 AM EST -----  Regarding: Dr. Ordoñez Best: [phone]    Caller's first and last name: n/a  Reason for call: Pt says she would like a call back from [address], Dr. Echo Lock nurse, because \"chest crud\" is happening again and would like to know if she can come to office for prescriptions for \"tussionex\" and \"prednisone\"  Callback required yes/no and why: Yes, patient wants to speak directly to nurse  [facility] contact number(s):phone: [phone]  Details to clarify the request: N/A

## 2019-12-10 NOTE — PROGRESS NOTES
HISTORY OF PRESENT ILLNESS  Dhiraj Cortés is a 77 y.o. female. HPI   Pre-op for cataract surgery. Started with cough and congestion over this past weekend. She started on the prednisone and is feeling better. Wheezing has improved and cough is less. No fever or chills noted. No chest pain or SOB. Had GYN exam on last month with breast and rectal exams. Cardiovascular Review:  The patient has hypertension, hyperlipidemia and obesity. H/o PAF and SVT. S/p ablation by cardiology. Diet and Lifestyle: generally follows a low fat low cholesterol diet, generally follows a low sodium diet, follows a diabetic diet regularly, exercises sporadically but has started back with more regular walking. Pertinent ROS: taking medications as instructed, no medication side effects noted, no TIA's, no chest pain on exertion, no dyspnea on exertion, no swelling of ankles. Diabetes Mellitus:  She has glucose intolerance. Diabetic ROS - diabetic diet compliance: compliant most of the time, further diabetic ROS: no polyuria or polydipsia, no chest pain, dyspnea or TIA's, no numbness, tingling or pain in extremities, no unusual visual symptoms. Lab review: orders written for new lab studies as appropriate; see orders. Depression Review:  Patient is seen for followup of depression. Treatment includes Wellbutrin, Lexapro. Ongoing symptoms include none, feeling great. She denies depressed mood, insomnia and fatigue. She experiences the following side effects from the treatment: none.     Patient Active Problem List   Diagnosis Code    Depression F32.9    Anxiety F41.9    Hypercholesterolemia E78.00    Renal artery stenosis (HCC) I70.1    History of hepatitis B Z86.19    Glucose intolerance (impaired glucose tolerance) R73.02    Essential hypertension, benign I5    Encounter for medication monitoring Z51.81    H/O atrial fibrillation without current medication Z86.79    Asthmatic bronchitis J45.909  Environmental and seasonal allergies J30.89    Severe obesity (BMI 35.0-39. 9) with comorbidity ([facility]) E66.01       Current Outpatient Medications   Medication Sig Dispense Refill    chlorpheniramine-HYDROcodone (TUSSIONEX) 10-8 mg/5 mL suspension Take 5 mL by mouth every twelve (12) hours as needed for Cough for up to 30 days. Max Daily Amount: 10 mL. 180 mL 0    predniSONE (DELTASONE) 10 mg tablet Taper as directed 50 Tab 0    rosuvastatin (CRESTOR) 10 mg tablet Take 1 Tab by mouth nightly. 90 Tab 3    flaxseed oil-omega 3,6,9 1,300 mg-845 mg -117 mg-117 mg cap Take 1 Tab by mouth daily.  budesonide-formoterol (SYMBICORT) 160-4.5 mcg/actuation HFAA Take 2 Puffs by inhalation daily as needed.  pantoprazole (PROTONIX) 40 mg tablet Take 1 Tab by mouth daily. 90 Tab 3    predniSONE (DELTASONE) 10 mg tablet Take twice a day as directed. 50 Tab 1    metoprolol succinate (TOPROL-XL) 100 mg tablet Take 1 Tab by mouth daily. 90 Tab 3    losartan (COZAAR) 50 mg tablet TAKE 1 TABLET DAILY 90 Tab 3    escitalopram oxalate (LEXAPRO) 20 mg tablet TAKE 1 TABLET DAILY 90 Tab 3    buPROPion XL (WELLBUTRIN XL) 300 mg XL tablet TAKE 1 TABLET EVERY MORNING 90 Tab 3    montelukast (SINGULAIR) 10 mg tablet Take 10 mg by mouth daily.  albuterol (PROVENTIL VENTOLIN) 2.5 mg /3 mL (0.083 %) nebulizer solution 3 mL by Nebulization route every four (4) hours as needed for Wheezing. 24 Each 11    albuterol (PROVENTIL HFA, VENTOLIN HFA, PROAIR HFA) 90 mcg/actuation inhaler Take 1-2 Puffs by inhalation every four (4) hours as needed for Wheezing. 1 Inhaler 11    cinnamon bark (CINNAMON) 500 mg cap Take 500 mg by mouth two (2) times a day.  KRILL OIL PO Take 300 mg by mouth daily.  Cholecalciferol, Vitamin D3, (VITAMIN D) 2,000 unit Cap Take 1 Tab by mouth daily.  MULTIVITAMIN PO Take 1 Tab by mouth daily.          Allergies   Allergen Reactions    Iodinated Contrast Media Swelling    Clindamycin Nausea and Vomiting    Codeine Other (comments)     Headaches      Morphine Other (comments)     headache       Past Medical History:   Diagnosis Date    Anxiety     Arrhythmia     hx A-Fib    Arthritis     knees    Asthma     Depression     History of hepatitis B     Hypercholesterolemia     Hypertension     Renal artery stenosis ([facility] [address] 2006       Past Surgical History:   Procedure Laterality Date    CARDIAC SURG PROCEDURE UNLIST  4/28/2015    cardiac ablation     HX BLEPHAROPLASTY  04/26/2016    HX COLONOSCOPY  05/20/2016    \"    HX HYSTERECTOMY  1988    HX KNEE REPLACEMENT  05/22/2018    right knee    [place] COLONOSCOPY FLX DX W/COLLJ SPEC WHEN PFRMD  12/01/2010    dr Araceli Mayer       Family History   Problem Relation Age of Onset    Cancer Mother         multiple sites    Cancer Father         lung    Heart Disease Father     Heart Disease Brother     Heart Attack Brother     Lung Disease Brother         COPD    No Known Problems Sister     Hypertension Brother        Social History     Tobacco Use    Smoking status: Never Smoker    Smokeless tobacco: Never Used   Substance Use Topics    Alcohol use:  Yes     Alcohol/week: 0.0 standard drinks     Comment: socially        Lab Results   Component Value Date/Time    WBC 6.5 10/23/2018 11:31 AM    HGB 14.0 10/23/2018 11:31 AM    HCT 42.1 10/23/2018 11:31 AM    PLATELET 563 90/61/8768 11:31 AM    MCV 94 10/23/2018 11:31 AM     Lab Results   Component Value Date/Time    Cholesterol, total 186 08/07/2019 08:05 AM    HDL Cholesterol 55 08/07/2019 08:05 AM    LDL, calculated 98 08/07/2019 08:05 AM    Triglyceride 165 (H) 08/07/2019 08:05 AM     Lab Results   Component Value Date/Time    TSH 3.880 10/26/2016 07:50 AM    T4, Free 1.14 01/28/2015 02:43 PM      Lab Results   Component Value Date/Time    Sodium 142 04/30/2019 10:22 AM    Potassium 4.0 04/30/2019 10:22 AM    Chloride 101 04/30/2019 10:22 AM    CO2 25 04/30/2019 10:22 AM    Glucose 124 (H) 04/30/2019 10:22 AM    BUN 12 04/30/2019 10:22 AM    Creatinine 0.91 04/30/2019 10:22 AM    BUN/Creatinine ratio 13 04/30/2019 10:22 AM    GFR est AA 76 04/30/2019 10:22 AM    GFR est non-AA 65 04/30/2019 10:22 AM    Calcium 9.6 04/30/2019 10:22 AM    Bilirubin, total 0.3 08/07/2019 08:05 AM    ALT (SGPT) 25 08/07/2019 08:05 AM    AST (SGOT) 22 08/07/2019 08:05 AM    Alk. phosphatase 110 08/07/2019 08:05 AM    Protein, total 6.9 08/07/2019 08:05 AM    Albumin 4.1 08/07/2019 08:05 AM    A-G Ratio 1.6 04/30/2019 10:22 AM      Lab Results   Component Value Date/Time    Hemoglobin A1c 5.7 (H) 03/27/2013 01:20 PM    Hemoglobin A1c (POC) 5.8 04/30/2019 10:22 AM         Review of Systems   Constitutional: Negative for malaise/fatigue. HENT: Positive for congestion. Eyes: Negative for blurred vision. Respiratory: Positive for cough. Negative for shortness of breath. Cardiovascular: Negative for chest pain, palpitations and leg swelling. Gastrointestinal: Negative for abdominal pain, constipation and heartburn. Genitourinary: Negative for dysuria, frequency and urgency. Musculoskeletal: Negative for back pain and joint pain. Neurological: Negative for dizziness, tingling and headaches. Endo/Heme/Allergies: Negative for environmental allergies. Psychiatric/Behavioral: Negative for depression. The patient does not have insomnia. Physical Exam  Vitals signs and nursing note reviewed. Constitutional:       Appearance: Normal appearance. She is well-developed. Comments: /84   Pulse 63   Temp 97.3 °F (36.3 °C) (Oral)   Resp 16   Ht 5' 8\" (1.727 m)   Wt 253 lb 3.2 oz (114.9 kg)   SpO2 96%    L/min   BMI 38.50 kg/m²      HENT:      Right Ear: Tympanic membrane and ear canal normal.      Left Ear: Tympanic membrane and ear canal normal.      Nose: No mucosal edema or rhinorrhea. Right Turbinates: Swollen. Left Turbinates: Swollen.       Right Sinus: No maxillary sinus tenderness or frontal sinus tenderness. Left Sinus: No maxillary sinus tenderness or frontal sinus tenderness. Neck:      Musculoskeletal: Normal range of motion and neck supple. Thyroid: No thyromegaly. Vascular: No carotid bruit. Cardiovascular:      Rate and Rhythm: Normal rate and regular rhythm. Heart sounds: Normal heart sounds. Pulmonary:      Effort: Pulmonary effort is normal.      Breath sounds: Normal breath sounds. No wheezing or rhonchi. Abdominal:      General: Abdomen is flat. Bowel sounds are normal.      Palpations: Abdomen is soft. There is no mass. Tenderness: There is no tenderness. Hernia: No hernia is present. Musculoskeletal: Normal range of motion. General: No swelling. Lymphadenopathy:      Cervical: No cervical adenopathy. Skin:     General: Skin is warm and dry. ASSESSMENT and PLAN  Diagnoses and all orders for this visit:    1. Medicare annual wellness visit, subsequent    2. Essential hypertension, benign  Discussed sodium restriction, high k rich diet, maintaining ideal body weight and regular exercise program such as daily walking 30 min perday 4-5 times per week, as physiologic means to achieve blood pressure control.  Medication compliance advised. 3. Glucose intolerance (impaired glucose tolerance)  -     AMB POC HEMOGLOBIN A1C  -     AMB POC GLUCOSE, QUANTITATIVE, BLOOD    4. Hypercholesterolemia  -     LIPID PANEL    5. Reactive airway disease that is not asthma//  6. Environmental and seasonal allergies  Continue with taper on prednisone. 7. Mild depression ([address]    8. Gastroesophageal reflux disease without esophagitis  Stable on protonix. 9. Encounter for medication monitoring  -     CBC W/O DIFF  -     METABOLIC PANEL, COMPREHENSIVE    10. Pre-op exam//  11. Senile cataract of right eye, unspecified age-related cataract type  -     AMB POC URINALYSIS DIP STICK AUTO W/ MICRO  Medically stable for surgery. 12. Non morbid obesity  I have reviewed/discussed the above normal BMI with the patient. I have recommended the following interventions: dietary management education, guidance, and counseling . Follow-up and Dispositions    · Return in about 6 months (around 6/11/2020). reviewed diet, exercise and weight control  cardiovascular risk and specific lipid/LDL goals reviewed  reviewed medications and side effects in detail  specific diabetic recommendations: low cholesterol diet, weight control and daily exercise discussed and glycohemoglobin and other lab monitoring discussed     I have discussed diagnosis listed in this note with pt and/or family. I have discussed treatment plans and options and the risk/benefit analysis of those options, including safe use of medications and possible medication side effects. Through the use of shared decision making we have agreed to the above plan. The patient has received an after-visit summary and questions were answered concerning future plans and follow up. Advise pt of any urgent changes then to proceed to the ER.

## 2019-12-11 ENCOUNTER — OFFICE VISIT (OUTPATIENT)
Dept: FAMILY MEDICINE CLINIC | Age: 69
End: 2019-12-11

## 2019-12-11 ENCOUNTER — HOSPITAL ENCOUNTER (OUTPATIENT)
Dept: LAB | Age: 69
Discharge: HOME | End: 2019-12-11
Payer: MEDICARE

## 2019-12-11 VITALS
HEIGHT: 68 IN | SYSTOLIC BLOOD PRESSURE: 138 MMHG | HEART RATE: 63 BPM | BODY MASS INDEX: 38.37 KG/M2 | OXYGEN SATURATION: 96 % | TEMPERATURE: 97.3 F | WEIGHT: 253.2 LBS | RESPIRATION RATE: 16 BRPM | DIASTOLIC BLOOD PRESSURE: 84 MMHG

## 2019-12-11 DIAGNOSIS — I10 ESSENTIAL HYPERTENSION, BENIGN: ICD-10-CM

## 2019-12-11 DIAGNOSIS — J30.89 ENVIRONMENTAL AND SEASONAL ALLERGIES: ICD-10-CM

## 2019-12-11 DIAGNOSIS — Z51.81 ENCOUNTER FOR MEDICATION MONITORING: ICD-10-CM

## 2019-12-11 DIAGNOSIS — E66.9 NON MORBID OBESITY: ICD-10-CM

## 2019-12-11 DIAGNOSIS — H25.9 SENILE CATARACT OF RIGHT EYE, UNSPECIFIED AGE-RELATED CATARACT TYPE: ICD-10-CM

## 2019-12-11 DIAGNOSIS — F32.A MILD DEPRESSION: ICD-10-CM

## 2019-12-11 DIAGNOSIS — E78.00 HYPERCHOLESTEROLEMIA: ICD-10-CM

## 2019-12-11 DIAGNOSIS — J98.9 REACTIVE AIRWAY DISEASE THAT IS NOT ASTHMA: ICD-10-CM

## 2019-12-11 DIAGNOSIS — Z00.00 MEDICARE ANNUAL WELLNESS VISIT, SUBSEQUENT: Primary | ICD-10-CM

## 2019-12-11 DIAGNOSIS — R73.02 GLUCOSE INTOLERANCE (IMPAIRED GLUCOSE TOLERANCE): ICD-10-CM

## 2019-12-11 DIAGNOSIS — Z01.818 PRE-OP EXAM: ICD-10-CM

## 2019-12-11 DIAGNOSIS — K21.9 GASTROESOPHAGEAL REFLUX DISEASE WITHOUT ESOPHAGITIS: ICD-10-CM

## 2019-12-11 LAB
BILIRUB UR QL STRIP: NEGATIVE
GLUCOSE POC: 94 MG/DL
GLUCOSE UR-MCNC: NEGATIVE MG/DL
HBA1C MFR BLD HPLC: 5.9 %
KETONES P FAST UR STRIP-MCNC: NEGATIVE MG/DL
PH UR STRIP: 6 [PH] (ref 4.6–8)
PROT UR QL STRIP: NEGATIVE
SP GR UR STRIP: 1.02 (ref 1–1.03)
UA UROBILINOGEN AMB POC: NORMAL (ref 0.2–1)
URINALYSIS CLARITY POC: CLEAR
URINALYSIS COLOR POC: YELLOW
URINE BLOOD POC: NEGATIVE
URINE LEUKOCYTES POC: NORMAL
URINE NITRITES POC: NEGATIVE

## 2019-12-11 PROCEDURE — 85027 COMPLETE CBC AUTOMATED: CPT

## 2019-12-11 PROCEDURE — 80053 COMPREHEN METABOLIC PANEL: CPT

## 2019-12-11 PROCEDURE — 80061 LIPID PANEL: CPT

## 2019-12-11 PROCEDURE — 36415 COLL VENOUS BLD VENIPUNCTURE: CPT

## 2019-12-11 NOTE — PROGRESS NOTES
Chief Complaint   Patient presents with    Pre-op Exam     right cataract surgery by Dr. Fani Moreland       Patient states she had a mammogram 11/5/2019 and bone density at [facility]. Patient signed medical release. 1. Have you been to the ER, urgent care clinic since your last visit? Hospitalized since your last visit? No    2. Have you seen or consulted any other health care providers outside of the [address] [place] [address] since your last visit? Include any pap smears or colon screening.  No

## 2019-12-12 LAB
ALBUMIN SERPL-MCNC: 3.9 G/DL (ref 3.6–4.8)
ALBUMIN/GLOB SERPL: 1.9 {RATIO} (ref 1.2–2.2)
ALP SERPL-CCNC: 110 IU/L (ref 39–117)
ALT SERPL-CCNC: 23 IU/L (ref 0–32)
AST SERPL-CCNC: 13 IU/L (ref 0–40)
BILIRUB SERPL-MCNC: 0.3 MG/DL (ref 0–1.2)
BUN SERPL-MCNC: 18 MG/DL (ref 8–27)
BUN/CREAT SERPL: 18 (ref 12–28)
CALCIUM SERPL-MCNC: 9.2 MG/DL (ref 8.7–10.3)
CHLORIDE SERPL-SCNC: 103 MMOL/L (ref 96–106)
CHOLEST SERPL-MCNC: 188 MG/DL (ref 100–199)
CO2 SERPL-SCNC: 26 MMOL/L (ref 20–29)
CREAT SERPL-MCNC: 1 MG/DL (ref 0.57–1)
ERYTHROCYTE [DISTWIDTH] IN BLOOD BY AUTOMATED COUNT: 12.9 % (ref 12.3–15.4)
GLOBULIN SER CALC-MCNC: 2.1 G/DL (ref 1.5–4.5)
GLUCOSE SERPL-MCNC: 85 MG/DL (ref 65–99)
HCT VFR BLD AUTO: 40.7 % (ref 34–46.6)
HDLC SERPL-MCNC: 65 MG/DL
HGB BLD-MCNC: 13.7 G/DL (ref 11.1–15.9)
INTERPRETATION, 910389: NORMAL
INTERPRETATION: NORMAL
LDLC SERPL CALC-MCNC: 99 MG/DL (ref 0–99)
MCH RBC QN AUTO: 31.9 PG (ref 26.6–33)
MCHC RBC AUTO-ENTMCNC: 33.7 G/DL (ref 31.5–35.7)
MCV RBC AUTO: 95 FL (ref 79–97)
PDF IMAGE, 910387: NORMAL
PLATELET # BLD AUTO: 256 X10E3/UL (ref 150–450)
POTASSIUM SERPL-SCNC: 3.7 MMOL/L (ref 3.5–5.2)
PROT SERPL-MCNC: 6 G/DL (ref 6–8.5)
RBC # BLD AUTO: 4.3 X10E6/UL (ref 3.77–5.28)
SODIUM SERPL-SCNC: 145 MMOL/L (ref 134–144)
TRIGL SERPL-MCNC: 119 MG/DL (ref 0–149)
VLDLC SERPL CALC-MCNC: 24 MG/DL (ref 5–40)
WBC # BLD AUTO: 10 X10E3/UL (ref 3.4–10.8)

## 2020-03-10 ENCOUNTER — TELEPHONE (OUTPATIENT)
Dept: FAMILY MEDICINE CLINIC | Age: 70
End: 2020-03-10

## 2020-03-19 DIAGNOSIS — F32.A DEPRESSION, UNSPECIFIED DEPRESSION TYPE: ICD-10-CM

## 2020-03-19 DIAGNOSIS — K21.9 GASTROESOPHAGEAL REFLUX DISEASE WITHOUT ESOPHAGITIS: ICD-10-CM

## 2020-03-19 DIAGNOSIS — I47.1 PAROXYSMAL SVT (SUPRAVENTRICULAR TACHYCARDIA) (HCC): ICD-10-CM

## 2020-03-19 NOTE — TELEPHONE ENCOUNTER
Patient called and left message stating that she needs all of her medications renewed. Last visit:12/11/19  Next visit:6/12/20  Previous refill symbicort, protonix and metoporolol- 4/30/19     Losartan, lexapro and wellbutrin XL- 3/24/19    Requested Prescriptions     Pending Prescriptions Disp Refills    budesonide-formoteroL (Symbicort) 160-4.5 mcg/actuation HFAA 3 Inhaler 3     Sig: Take 2 Puffs by inhalation daily as needed.  buPROPion XL (WELLBUTRIN XL) 300 mg XL tablet 90 Tab 3     Sig: TAKE 1 TABLET EVERY MORNING    escitalopram oxalate (LEXAPRO) 20 mg tablet 90 Tab 3     Sig: TAKE 1 TABLET DAILY    losartan (COZAAR) 50 mg tablet 90 Tab 3     Sig: TAKE 1 TABLET DAILY    metoprolol succinate (TOPROL-XL) 100 mg tablet 90 Tab 3     Sig: Take 1 Tab by mouth daily.  pantoprazole (PROTONIX) 40 mg tablet 90 Tab 3     Sig: Take 1 Tab by mouth daily.

## 2020-03-20 RX ORDER — BUDESONIDE AND FORMOTEROL FUMARATE DIHYDRATE 160; 4.5 UG/1; UG/1
2 AEROSOL RESPIRATORY (INHALATION)
Qty: 3 INHALER | Refills: 3 | Status: SHIPPED | OUTPATIENT
Start: 2020-03-20 | End: 2020-12-17

## 2020-03-20 RX ORDER — ESCITALOPRAM OXALATE 20 MG/1
TABLET ORAL
Qty: 90 TAB | Refills: 3 | Status: SHIPPED | OUTPATIENT
Start: 2020-03-20 | End: 2020-03-27 | Stop reason: SDUPTHER

## 2020-03-20 RX ORDER — METOPROLOL SUCCINATE 100 MG/1
100 TABLET, EXTENDED RELEASE ORAL DAILY
Qty: 90 TAB | Refills: 3 | Status: SHIPPED | OUTPATIENT
Start: 2020-03-20 | End: 2021-04-28 | Stop reason: SDUPTHER

## 2020-03-20 RX ORDER — PANTOPRAZOLE SODIUM 40 MG/1
40 TABLET, DELAYED RELEASE ORAL DAILY
Qty: 90 TAB | Refills: 3 | Status: SHIPPED | OUTPATIENT
Start: 2020-03-20 | End: 2020-03-27 | Stop reason: SDUPTHER

## 2020-03-20 RX ORDER — LOSARTAN POTASSIUM 50 MG/1
TABLET ORAL
Qty: 90 TAB | Refills: 3 | Status: SHIPPED | OUTPATIENT
Start: 2020-03-20 | End: 2020-03-27 | Stop reason: SDUPTHER

## 2020-03-20 RX ORDER — BUPROPION HYDROCHLORIDE 300 MG/1
TABLET ORAL
Qty: 90 TAB | Refills: 3 | Status: SHIPPED | OUTPATIENT
Start: 2020-03-20 | End: 2020-03-27 | Stop reason: SDUPTHER

## 2020-03-27 DIAGNOSIS — F32.A DEPRESSION, UNSPECIFIED DEPRESSION TYPE: ICD-10-CM

## 2020-03-27 DIAGNOSIS — K21.9 GASTROESOPHAGEAL REFLUX DISEASE WITHOUT ESOPHAGITIS: ICD-10-CM

## 2020-03-27 RX ORDER — BUPROPION HYDROCHLORIDE 300 MG/1
TABLET ORAL
Qty: 90 TAB | Refills: 3 | Status: SHIPPED | OUTPATIENT
Start: 2020-03-27 | End: 2021-04-28 | Stop reason: SDUPTHER

## 2020-03-27 RX ORDER — PANTOPRAZOLE SODIUM 40 MG/1
40 TABLET, DELAYED RELEASE ORAL DAILY
Qty: 90 TAB | Refills: 3 | Status: SHIPPED | OUTPATIENT
Start: 2020-03-27 | End: 2021-04-28 | Stop reason: SDUPTHER

## 2020-03-27 RX ORDER — LOSARTAN POTASSIUM 50 MG/1
TABLET ORAL
Qty: 90 TAB | Refills: 3 | Status: SHIPPED | OUTPATIENT
Start: 2020-03-27 | End: 2021-04-28 | Stop reason: SDUPTHER

## 2020-03-27 RX ORDER — ESCITALOPRAM OXALATE 20 MG/1
TABLET ORAL
Qty: 90 TAB | Refills: 3 | Status: SHIPPED | OUTPATIENT
Start: 2020-03-27 | End: 2021-04-28 | Stop reason: SDUPTHER

## 2020-03-27 NOTE — TELEPHONE ENCOUNTER
----- Message from Alexandrea Erickson sent at 3/27/2020 12:27 PM EDT -----  Regarding: Dr. Tee Alegria: [phone]  Pt is requesting a Rx refill on \"Buprotion 300 SR\", \"Escitalopram 20 mg\", \"Losartan 50 mg\" & \"Pantoprazole 40 mg\" to be called into [facility] [email or web address] [facility] listed on file.    [facility]:[phone]

## 2020-06-12 ENCOUNTER — VIRTUAL VISIT (OUTPATIENT)
Dept: FAMILY MEDICINE CLINIC | Age: 70
End: 2020-06-12

## 2020-06-12 DIAGNOSIS — E78.00 HYPERCHOLESTEROLEMIA: ICD-10-CM

## 2020-06-12 DIAGNOSIS — I10 ESSENTIAL HYPERTENSION, BENIGN: Primary | ICD-10-CM

## 2020-06-12 DIAGNOSIS — R73.02 GLUCOSE INTOLERANCE (IMPAIRED GLUCOSE TOLERANCE): ICD-10-CM

## 2020-06-12 DIAGNOSIS — I47.1 PAROXYSMAL SVT (SUPRAVENTRICULAR TACHYCARDIA) (HCC): ICD-10-CM

## 2020-06-12 DIAGNOSIS — F32.A MILD DEPRESSION: ICD-10-CM

## 2020-06-12 DIAGNOSIS — K21.9 GASTROESOPHAGEAL REFLUX DISEASE WITHOUT ESOPHAGITIS: ICD-10-CM

## 2020-06-12 DIAGNOSIS — J30.89 ENVIRONMENTAL AND SEASONAL ALLERGIES: ICD-10-CM

## 2020-06-12 DIAGNOSIS — Z51.81 ENCOUNTER FOR MEDICATION MONITORING: ICD-10-CM

## 2020-06-12 NOTE — PROGRESS NOTES
HISTORY OF PRESENT ILLNESS  Alexei Crespo is a 77 y.o. female. HPI   Patient encounter by synchronous (real-time) audio-video technology which is patient-initiated. Patient is aware that this encounter is a billable service with coverage as determined by her insurance carrier, all discussed at the time of check-in. Patient has given verbal consent to proceed. Follow up on chronic medical problems. Overall feeling well. She has not been working since [address]. Doing the precautionary measures at home to reduce risks of exposure COVID19. Also wearing mask when she is going out. No known sick contacts or known exposure to [address]. Cardiovascular Review:  The patient has hypertension, hyperlipidemia and obesity. H/o PAF and SVT. S/p ablation by cardiology. Stopped the crestor d/t muscle cramps. Her calculated ASCVD risk based on her cholesterol prior to taking the crestor is 9.0%. She has had muscle pain and cramping with multiple statins. Diet and Lifestyle: generally follows a low fat low cholesterol diet, generally follows a low sodium diet, follows a diabetic diet regularly, exercises sporadically but has started back with more regular walking. Pertinent ROS: taking medications as instructed, no medication side effects noted, no TIA's, no chest pain on exertion, no dyspnea on exertion, no swelling of ankles. Diabetes Mellitus:  She has glucose intolerance. Last a1c level was 5.9% in December. Diabetic ROS - diabetic diet compliance: compliant most of the time, further diabetic ROS: no polyuria or polydipsia, no chest pain, dyspnea or TIA's, no numbness, tingling or pain in extremities, no unusual visual symptoms. Lab review: orders written for new lab studies as appropriate; see orders. Depression Review:  Patient is seen for followup of depression. Treatment includes Wellbutrin, Lexapro. Ongoing symptoms include none, feeling great.   She denies depressed mood, insomnia and fatigue. She experiences the following side effects from the treatment: none. HM:  Mammogram 11/15/2019  Bone density 11/15/2019  A1C 12/11/2019  Colonoscopy 5/20/2016 by Dr. Vinicius rodriguez in 5 years. Patient states she had an eye exam 3/2020 by Dr. Wilmer Campos. Patient Active Problem List   Diagnosis Code    Depression F32.9    Anxiety F41.9    Hypercholesterolemia E78.00    Renal artery stenosis (HCC) I70.1    History of hepatitis B Z86.19    Glucose intolerance (impaired glucose tolerance) R73.02    Essential hypertension, benign I10    Encounter for medication monitoring Z51.81    H/O atrial fibrillation without current medication Z86.79    Asthmatic bronchitis J45.909    Environmental and seasonal allergies J30.89    Severe obesity (BMI 35.0-39. 9) with comorbidity (HCC) E66.01       Current Outpatient Medications   Medication Sig Dispense Refill    buPROPion XL (WELLBUTRIN XL) 300 mg XL tablet TAKE 1 TABLET EVERY MORNING 90 Tab 3    escitalopram oxalate (LEXAPRO) 20 mg tablet TAKE 1 TABLET DAILY 90 Tab 3    losartan (COZAAR) 50 mg tablet TAKE 1 TABLET DAILY 90 Tab 3    pantoprazole (PROTONIX) 40 mg tablet Take 1 Tab by mouth daily. 90 Tab 3    budesonide-formoteroL (Symbicort) 160-4.5 mcg/actuation HFAA Take 2 Puffs by inhalation daily as needed (wheezing). 3 Inhaler 3    metoprolol succinate (TOPROL-XL) 100 mg tablet Take 1 Tab by mouth daily. 90 Tab 3    montelukast (SINGULAIR) 10 mg tablet Take 10 mg by mouth daily.  albuterol (PROVENTIL VENTOLIN) 2.5 mg /3 mL (0.083 %) nebulizer solution 3 mL by Nebulization route every four (4) hours as needed for Wheezing. 24 Each 11    albuterol (PROVENTIL HFA, VENTOLIN HFA, PROAIR HFA) 90 mcg/actuation inhaler Take 1-2 Puffs by inhalation every four (4) hours as needed for Wheezing. 1 Inhaler 11    cinnamon bark (CINNAMON) 500 mg cap Take 500 mg by mouth two (2) times a day.       Cholecalciferol, Vitamin D3, (VITAMIN D) 2,000 unit Cap Take 1 Tab by mouth daily.  MULTIVITAMIN PO Take 1 Tab by mouth daily. Allergies   Allergen Reactions    Iodinated Contrast Media Swelling    Clindamycin Nausea and Vomiting    Codeine Other (comments)     Headaches      Morphine Other (comments)     headache       Past Medical History:   Diagnosis Date    Anxiety     Arrhythmia     hx A-Fib    Arthritis     knees    Asthma     Depression     History of hepatitis B     Hypercholesterolemia     Hypertension     Renal artery stenosis ([facility] [address] 2006       Past Surgical History:   Procedure Laterality Date    CARDIAC SURG PROCEDURE UNLIST  4/28/2015    cardiac ablation     HX BLEPHAROPLASTY  04/26/2016    HX CATARACT REMOVAL  12/2019    right    HX CATARACT REMOVAL  01/2020    left     HX COLONOSCOPY  05/20/2016    \"    HX HYSTERECTOMY  1988    HX KNEE REPLACEMENT  05/22/2018    right knee    HX ORTHOPAEDIC  05/21/2020    right thumb    AR COLONOSCOPY FLX DX W/COLLJ SPEC WHEN PFRMD  12/01/2010    dr Annetta Ceballos       Family History   Problem Relation Age of Onset    Cancer Mother         multiple sites    Cancer Father         lung    Heart Disease Father     Heart Disease Brother     Heart Attack Brother     Lung Disease Brother         COPD    Other Sister         gastroparesis    Hypertension Brother     Elevated Lipids Brother        Social History     Tobacco Use    Smoking status: Never Smoker    Smokeless tobacco: Never Used   Substance Use Topics    Alcohol use:  Yes     Alcohol/week: 0.0 standard drinks     Comment: socially        Lab Results   Component Value Date/Time    WBC 10.0 12/11/2019 08:42 AM    HGB 13.7 12/11/2019 08:42 AM    HCT 40.7 12/11/2019 08:42 AM    PLATELET 669 24/41/4954 08:42 AM    MCV 95 12/11/2019 08:42 AM     Lab Results   Component Value Date/Time    Cholesterol, total 188 12/11/2019 08:42 AM    HDL Cholesterol 65 12/11/2019 08:42 AM    LDL, calculated 99 12/11/2019 08:42 AM    Triglyceride 119 12/11/2019 08:42 AM     Lab Results   Component Value Date/Time    TSH 3.880 10/26/2016 07:50 AM    T4, Free 1.14 01/28/2015 02:43 PM      Lab Results   Component Value Date/Time    Sodium 145 (H) 12/11/2019 08:42 AM    Potassium 3.7 12/11/2019 08:42 AM    Chloride 103 12/11/2019 08:42 AM    CO2 26 12/11/2019 08:42 AM    Glucose 85 12/11/2019 08:42 AM    BUN 18 12/11/2019 08:42 AM    Creatinine 1.00 12/11/2019 08:42 AM    BUN/Creatinine ratio 18 12/11/2019 08:42 AM    GFR est AA 67 12/11/2019 08:42 AM    GFR est non-AA 58 (L) 12/11/2019 08:42 AM    Calcium 9.2 12/11/2019 08:42 AM    Bilirubin, total 0.3 12/11/2019 08:42 AM    ALT (SGPT) 23 12/11/2019 08:42 AM    Alk. phosphatase 110 12/11/2019 08:42 AM    Protein, total 6.0 12/11/2019 08:42 AM    Albumin 3.9 12/11/2019 08:42 AM    A-G Ratio 1.9 12/11/2019 08:42 AM      Lab Results   Component Value Date/Time    Hemoglobin A1c 5.7 (H) 03/27/2013 01:20 PM    Hemoglobin A1c (POC) 5.9 12/11/2019 08:42 AM         Review of Systems   Constitutional: Negative for malaise/fatigue. HENT: Negative for congestion. Eyes: Negative for blurred vision. Respiratory: Negative for cough and shortness of breath. Cardiovascular: Negative for chest pain, palpitations and leg swelling. Gastrointestinal: Negative for abdominal pain, constipation and heartburn. Genitourinary: Negative for dysuria, frequency and urgency. Musculoskeletal: Negative for back pain and joint pain. Neurological: Negative for dizziness, tingling and headaches. Endo/Heme/Allergies: Negative for environmental allergies. Psychiatric/Behavioral: Negative for depression. The patient does not have insomnia. Physical Exam  Constitutional:       Appearance: Normal appearance. Pulmonary:      Effort: Pulmonary effort is normal.   Neurological:      Mental Status: She is alert. Psychiatric:         Mood and Affect: Mood normal.         Thought Content:  Thought content normal. ASSESSMENT and PLAN  Diagnoses and all orders for this visit:    1. Essential hypertension, benign  Discussed sodium restriction, high k rich diet, maintaining ideal body weight and regular exercise program such as daily walking 30 min perday 4-5 times per week, as physiologic means to achieve blood pressure control.  Medication compliance advised. 2. Hypercholesterolemia  Continue to monitor. Work on diet and exercise. 3. Glucose intolerance (impaired glucose tolerance)  Continue to monitor. Work on diet and exercise. 4. Mild depression (HCC)  Stable     5. Paroxysmal SVT (supraventricular tachycardia) (HCC)  Stable     6. Gastroesophageal reflux disease without esophagitis  Stable     7. Environmental and seasonal allergies  Stable     8. Encounter for medication monitoring  Labs with her next visit. current treatment plan is effective, no change in therapy  reviewed diet, exercise and weight control  cardiovascular risk and specific lipid/LDL goals reviewed  reviewed medications and side effects in detail  specific diabetic recommendations: low cholesterol diet, weight control and daily exercise discussed and glycohemoglobin and other lab monitoring discussed     I have discussed diagnosis listed in this note with pt and/or family. I have discussed treatment plans and options and the risk/benefit analysis of those options, including safe use of medications and possible medication side effects. Through the use of shared decision making we have agreed to the above plan. The patient has received an after-visit summary and questions were answered concerning future plans and follow up. Advise pt of any urgent changes then to proceed to the ER. Take everyday precautions to keep space between yourself and others when you go out in public. Avoid crowds as much as possible.   Avoid non-essential travel going only out to get groceries, seek medical care or to the pharmacy to pick prescriptions. Wear a facemask when going out. During a COVID-19 outbreak in your community or work, stay home as much as possible to further reduce your risk of being exposed. Pt was told that currently there is no antiviral medication which is recommended to treat COVID-19. Current treatment is aimed to relieve sxs such as pain relievers and to avoid ibuprofen but to use acetaminophen, anti-cough medication, get plenty of rest and a lot of oral hydration. Due to this being a TeleHealth encounter (During [address] emergency), evaluation of the following organ systems was limited: Vital/Constitutional/EENT/Resp/CV/GI//MS/Neuro/Skin/Heme-Lymph-Imm. Pursuant to the emergency declaration under the [employer] and [employer], 1135 waiver authority and the [employer] Act, this Virtual Visit was conducted, with patient's consent, to reduce the patient's risk of exposure to COVID-19 and provide continuity of care for an established patient. Services were provided through a video synchronous discussion virtually to substitute for in-person appointment. This visit was completed using doxy. me    Time in virtual visit: 12  minutes

## 2020-06-12 NOTE — PROGRESS NOTES
Chief Complaint   Patient presents with    Hypertension     follow up    Cholesterol Problem     follow up    Blood sugar problem     follow up IGT     Patient states she stopped taking Crestor due to causing muscle cramps in arms and legs. Mammogram 11/15/2019    Bone density 11/15/2019    A1C 12/11/2019    Colonoscopy 5/20/2016 by Dr. Radu Hood repeat in 5 years. Patient states she had an eye exam 3/2020 by Dr. Kristen Paulino. 1. Have you been to the ER, urgent care clinic since your last visit? Hospitalized since your last visit? No    2. Have you seen or consulted any other health care providers outside of the [address] [place] [address] since your last visit? Include any pap smears or colon screening.  No

## 2020-12-01 ENCOUNTER — OFFICE VISIT (OUTPATIENT)
Dept: FAMILY MEDICINE CLINIC | Age: 70
End: 2020-12-01
Payer: MEDICARE

## 2020-12-01 VITALS
RESPIRATION RATE: 18 BRPM | HEIGHT: 68 IN | HEART RATE: 66 BPM | SYSTOLIC BLOOD PRESSURE: 136 MMHG | DIASTOLIC BLOOD PRESSURE: 73 MMHG | WEIGHT: 253.8 LBS | TEMPERATURE: 96.9 F | OXYGEN SATURATION: 96 % | BODY MASS INDEX: 38.46 KG/M2

## 2020-12-01 DIAGNOSIS — E66.9 NON MORBID OBESITY: ICD-10-CM

## 2020-12-01 DIAGNOSIS — I10 ESSENTIAL HYPERTENSION, BENIGN: ICD-10-CM

## 2020-12-01 DIAGNOSIS — I47.1 PAROXYSMAL SVT (SUPRAVENTRICULAR TACHYCARDIA) (HCC): ICD-10-CM

## 2020-12-01 DIAGNOSIS — E78.00 HYPERCHOLESTEROLEMIA: ICD-10-CM

## 2020-12-01 DIAGNOSIS — F32.A MILD DEPRESSION: ICD-10-CM

## 2020-12-01 DIAGNOSIS — K21.9 GASTROESOPHAGEAL REFLUX DISEASE WITHOUT ESOPHAGITIS: ICD-10-CM

## 2020-12-01 DIAGNOSIS — Z51.81 ENCOUNTER FOR MEDICATION MONITORING: ICD-10-CM

## 2020-12-01 DIAGNOSIS — R73.02 GLUCOSE INTOLERANCE (IMPAIRED GLUCOSE TOLERANCE): ICD-10-CM

## 2020-12-01 DIAGNOSIS — Z00.00 MEDICARE ANNUAL WELLNESS VISIT, SUBSEQUENT: Primary | ICD-10-CM

## 2020-12-01 PROCEDURE — G0439 PPPS, SUBSEQ VISIT: HCPCS | Performed by: FAMILY MEDICINE

## 2020-12-01 PROCEDURE — G8399 PT W/DXA RESULTS DOCUMENT: HCPCS | Performed by: FAMILY MEDICINE

## 2020-12-01 PROCEDURE — G8752 SYS BP LESS 140: HCPCS | Performed by: FAMILY MEDICINE

## 2020-12-01 PROCEDURE — G8754 DIAS BP LESS 90: HCPCS | Performed by: FAMILY MEDICINE

## 2020-12-01 PROCEDURE — 1090F PRES/ABSN URINE INCON ASSESS: CPT | Performed by: FAMILY MEDICINE

## 2020-12-01 PROCEDURE — 99214 OFFICE O/P EST MOD 30 MIN: CPT | Performed by: FAMILY MEDICINE

## 2020-12-01 PROCEDURE — G8536 NO DOC ELDER MAL SCRN: HCPCS | Performed by: FAMILY MEDICINE

## 2020-12-01 PROCEDURE — G8417 CALC BMI ABV UP PARAM F/U: HCPCS | Performed by: FAMILY MEDICINE

## 2020-12-01 PROCEDURE — G9899 SCRN MAM PERF RSLTS DOC: HCPCS | Performed by: FAMILY MEDICINE

## 2020-12-01 PROCEDURE — 93010 ELECTROCARDIOGRAM REPORT: CPT | Performed by: FAMILY MEDICINE

## 2020-12-01 PROCEDURE — 3017F COLORECTAL CA SCREEN DOC REV: CPT | Performed by: FAMILY MEDICINE

## 2020-12-01 PROCEDURE — 3288F FALL RISK ASSESSMENT DOCD: CPT | Performed by: FAMILY MEDICINE

## 2020-12-01 PROCEDURE — 1100F PTFALLS ASSESS-DOCD GE2>/YR: CPT | Performed by: FAMILY MEDICINE

## 2020-12-01 PROCEDURE — G0463 HOSPITAL OUTPT CLINIC VISIT: HCPCS | Performed by: FAMILY MEDICINE

## 2020-12-01 PROCEDURE — G9717 DOC PT DX DEP/BP F/U NT REQ: HCPCS | Performed by: FAMILY MEDICINE

## 2020-12-01 PROCEDURE — 93005 ELECTROCARDIOGRAM TRACING: CPT | Performed by: FAMILY MEDICINE

## 2020-12-01 PROCEDURE — G8427 DOCREV CUR MEDS BY ELIG CLIN: HCPCS | Performed by: FAMILY MEDICINE

## 2020-12-01 NOTE — PROGRESS NOTES
Chief Complaint   Patient presents with   [facility] Wellness Visit     Medicare Wellness         Mammogram 11/15/2019    Bone density 11/15/2019    A1C 12/11/2019     Colonoscopy 5/20/2016 by Dr. Becca Flanagan repeat in 5 years. Eye exam 3/2020 by Dr. Clifford Browne. 1. Have you been to the ER, urgent care clinic since your last visit? Hospitalized since your last visit? No    2. Have you seen or consulted any other health care providers outside of the [address] [place] [address] since your last visit? Include any pap smears or colon screening.  No

## 2020-12-02 ENCOUNTER — HOSPITAL ENCOUNTER (OUTPATIENT)
Dept: LAB | Age: 70
Discharge: HOME | End: 2020-12-02
Payer: MEDICARE

## 2020-12-02 PROCEDURE — 80053 COMPREHEN METABOLIC PANEL: CPT

## 2020-12-02 PROCEDURE — 36415 COLL VENOUS BLD VENIPUNCTURE: CPT

## 2020-12-02 PROCEDURE — 83036 HEMOGLOBIN GLYCOSYLATED A1C: CPT

## 2020-12-02 PROCEDURE — 85027 COMPLETE CBC AUTOMATED: CPT

## 2020-12-02 PROCEDURE — 80061 LIPID PANEL: CPT

## 2020-12-03 ENCOUNTER — TELEPHONE (OUTPATIENT)
Dept: FAMILY MEDICINE CLINIC | Age: 70
End: 2020-12-03

## 2020-12-03 LAB
ALBUMIN SERPL-MCNC: 4.1 G/DL (ref 3.8–4.8)
ALBUMIN/GLOB SERPL: 1.5 {RATIO} (ref 1.2–2.2)
ALP SERPL-CCNC: 114 IU/L (ref 39–117)
ALT SERPL-CCNC: 39 IU/L (ref 0–32)
AST SERPL-CCNC: 25 IU/L (ref 0–40)
BILIRUB SERPL-MCNC: 0.6 MG/DL (ref 0–1.2)
BUN SERPL-MCNC: 13 MG/DL (ref 8–27)
BUN/CREAT SERPL: 13 (ref 12–28)
CALCIUM SERPL-MCNC: 9.7 MG/DL (ref 8.7–10.3)
CHLORIDE SERPL-SCNC: 101 MMOL/L (ref 96–106)
CHOLEST SERPL-MCNC: 277 MG/DL (ref 100–199)
CO2 SERPL-SCNC: 26 MMOL/L (ref 20–29)
CREAT SERPL-MCNC: 1.04 MG/DL (ref 0.57–1)
ERYTHROCYTE [DISTWIDTH] IN BLOOD BY AUTOMATED COUNT: 12.8 % (ref 11.7–15.4)
EST. AVERAGE GLUCOSE BLD GHB EST-MCNC: 140 MG/DL
GLOBULIN SER CALC-MCNC: 2.7 G/DL (ref 1.5–4.5)
GLUCOSE SERPL-MCNC: 167 MG/DL (ref 65–99)
HBA1C MFR BLD: 6.5 % (ref 4.8–5.6)
HCT VFR BLD AUTO: 44.9 % (ref 34–46.6)
HDLC SERPL-MCNC: 51 MG/DL
HGB BLD-MCNC: 14.8 G/DL (ref 11.1–15.9)
INTERPRETATION, 910389: NORMAL
INTERPRETATION: NORMAL
LDLC SERPL CALC-MCNC: 189 MG/DL (ref 0–99)
Lab: NORMAL
MCH RBC QN AUTO: 32 PG (ref 26.6–33)
MCHC RBC AUTO-ENTMCNC: 33 G/DL (ref 31.5–35.7)
MCV RBC AUTO: 97 FL (ref 79–97)
PDF IMAGE, 910387: NORMAL
PLATELET # BLD AUTO: 226 X10E3/UL (ref 150–450)
POTASSIUM SERPL-SCNC: 4.5 MMOL/L (ref 3.5–5.2)
PROT SERPL-MCNC: 6.8 G/DL (ref 6–8.5)
RBC # BLD AUTO: 4.63 X10E6/UL (ref 3.77–5.28)
SODIUM SERPL-SCNC: 140 MMOL/L (ref 134–144)
TRIGL SERPL-MCNC: 195 MG/DL (ref 0–149)
VLDLC SERPL CALC-MCNC: 37 MG/DL (ref 5–40)
WBC # BLD AUTO: 5.6 X10E3/UL (ref 3.4–10.8)

## 2020-12-04 ENCOUNTER — TELEPHONE (OUTPATIENT)
Dept: FAMILY MEDICINE CLINIC | Age: 70
End: 2020-12-04

## 2020-12-04 NOTE — TELEPHONE ENCOUNTER
Patient was returning call to [address] [place] [address] she can be reached @  [phone]

## 2020-12-07 RX ORDER — METFORMIN HYDROCHLORIDE 500 MG/1
500 TABLET, EXTENDED RELEASE ORAL
Qty: 60 TAB | Refills: 5 | Status: SHIPPED | OUTPATIENT
Start: 2020-12-07 | End: 2021-02-24 | Stop reason: DRUGHIGH

## 2020-12-07 RX ORDER — ATORVASTATIN CALCIUM 20 MG/1
20 TABLET, FILM COATED ORAL
Qty: 30 TAB | Refills: 5 | Status: SHIPPED | OUTPATIENT
Start: 2020-12-07 | End: 2021-04-28 | Stop reason: SDUPTHER

## 2020-12-07 NOTE — TELEPHONE ENCOUNTER
Called. a1c level 6.5%. Start metformin. Start Lipitor for LDl 189. Follow up in 3 months.   Discussed SE.

## 2020-12-10 RX ORDER — INSULIN PUMP SYRINGE, 3 ML
EACH MISCELLANEOUS
Qty: 1 KIT | Refills: 0 | Status: SHIPPED | OUTPATIENT
Start: 2020-12-10 | End: 2020-12-15 | Stop reason: SDUPTHER

## 2020-12-10 RX ORDER — IBUPROFEN 200 MG
CAPSULE ORAL
Qty: 100 STRIP | Refills: 3 | Status: SHIPPED | OUTPATIENT
Start: 2020-12-10 | End: 2020-12-15 | Stop reason: SDUPTHER

## 2020-12-10 RX ORDER — LANCETS
EACH MISCELLANEOUS
Qty: 100 EACH | Refills: 3 | Status: SHIPPED | OUTPATIENT
Start: 2020-12-10 | End: 2020-12-15 | Stop reason: SDUPTHER

## 2020-12-15 RX ORDER — IBUPROFEN 200 MG
CAPSULE ORAL
Qty: 100 STRIP | Refills: 3 | Status: SHIPPED | OUTPATIENT
Start: 2020-12-15

## 2020-12-15 RX ORDER — LANCETS
EACH MISCELLANEOUS
Qty: 100 EACH | Refills: 3 | Status: SHIPPED | OUTPATIENT
Start: 2020-12-15

## 2020-12-15 RX ORDER — INSULIN PUMP SYRINGE, 3 ML
EACH MISCELLANEOUS
Qty: 1 KIT | Refills: 0 | Status: SHIPPED | OUTPATIENT
Start: 2020-12-15

## 2020-12-15 NOTE — TELEPHONE ENCOUNTER
----- Message from Fredrick Fatima sent at 12/15/2020 10:12 AM EST -----  Regarding: Dr. Anirudh Blas first and last name:  Rayashley Muñoz, Maria Eugenia N Derek Raya      Reason for call: callback       Callback required yes/no and why:      Best contact number(s): [phone]      Details to clarify the request: Pharmacy called for a new rx for the patients  Lancets and Glucometer, patient needs a new script with Dx Code, cant' be called in must be faxed       Fredrick Fatima

## 2020-12-17 ENCOUNTER — OFFICE VISIT (OUTPATIENT)
Dept: FAMILY MEDICINE CLINIC | Age: 70
End: 2020-12-17

## 2020-12-17 DIAGNOSIS — R73.02 GLUCOSE INTOLERANCE (IMPAIRED GLUCOSE TOLERANCE): Primary | ICD-10-CM

## 2021-01-08 ENCOUNTER — TRANSCRIBE ORDER (OUTPATIENT)
Dept: REGISTRATION | Age: 71
End: 2021-01-08

## 2021-01-08 ENCOUNTER — HOSPITAL ENCOUNTER (OUTPATIENT)
Dept: GENERAL RADIOLOGY | Age: 71
Discharge: HOME | End: 2021-01-08
Payer: MEDICARE

## 2021-01-08 DIAGNOSIS — J45.909 ASTHMATIC BRONCHITIS: ICD-10-CM

## 2021-01-08 DIAGNOSIS — J45.909 ASTHMATIC BRONCHITIS: Primary | ICD-10-CM

## 2021-01-08 PROCEDURE — 71046 X-RAY EXAM CHEST 2 VIEWS: CPT

## 2021-02-24 ENCOUNTER — OFFICE VISIT (OUTPATIENT)
Dept: FAMILY MEDICINE CLINIC | Age: 71
End: 2021-02-24
Payer: MEDICARE

## 2021-02-24 VITALS
HEIGHT: 68 IN | HEART RATE: 66 BPM | TEMPERATURE: 98 F | RESPIRATION RATE: 16 BRPM | WEIGHT: 242.4 LBS | DIASTOLIC BLOOD PRESSURE: 72 MMHG | SYSTOLIC BLOOD PRESSURE: 130 MMHG | OXYGEN SATURATION: 97 % | BODY MASS INDEX: 36.74 KG/M2

## 2021-02-24 DIAGNOSIS — F32.A MILD DEPRESSION: ICD-10-CM

## 2021-02-24 DIAGNOSIS — E11.9 TYPE 2 DIABETES MELLITUS WITHOUT COMPLICATION, WITHOUT LONG-TERM CURRENT USE OF INSULIN (HCC): ICD-10-CM

## 2021-02-24 DIAGNOSIS — E66.9 NON MORBID OBESITY: ICD-10-CM

## 2021-02-24 DIAGNOSIS — K21.9 GASTROESOPHAGEAL REFLUX DISEASE WITHOUT ESOPHAGITIS: ICD-10-CM

## 2021-02-24 DIAGNOSIS — E78.00 HYPERCHOLESTEROLEMIA: ICD-10-CM

## 2021-02-24 DIAGNOSIS — I10 ESSENTIAL HYPERTENSION, BENIGN: Primary | ICD-10-CM

## 2021-02-24 DIAGNOSIS — Z51.81 ENCOUNTER FOR MEDICATION MONITORING: ICD-10-CM

## 2021-02-24 LAB
GLUCOSE POC: 133 MG/DL
HBA1C MFR BLD HPLC: 5.6 %

## 2021-02-24 PROCEDURE — 1090F PRES/ABSN URINE INCON ASSESS: CPT | Performed by: FAMILY MEDICINE

## 2021-02-24 PROCEDURE — 82947 ASSAY GLUCOSE BLOOD QUANT: CPT | Performed by: FAMILY MEDICINE

## 2021-02-24 PROCEDURE — G8427 DOCREV CUR MEDS BY ELIG CLIN: HCPCS | Performed by: FAMILY MEDICINE

## 2021-02-24 PROCEDURE — 3046F HEMOGLOBIN A1C LEVEL >9.0%: CPT | Performed by: FAMILY MEDICINE

## 2021-02-24 PROCEDURE — G8754 DIAS BP LESS 90: HCPCS | Performed by: FAMILY MEDICINE

## 2021-02-24 PROCEDURE — G8417 CALC BMI ABV UP PARAM F/U: HCPCS | Performed by: FAMILY MEDICINE

## 2021-02-24 PROCEDURE — 3017F COLORECTAL CA SCREEN DOC REV: CPT | Performed by: FAMILY MEDICINE

## 2021-02-24 PROCEDURE — 3288F FALL RISK ASSESSMENT DOCD: CPT | Performed by: FAMILY MEDICINE

## 2021-02-24 PROCEDURE — G9717 DOC PT DX DEP/BP F/U NT REQ: HCPCS | Performed by: FAMILY MEDICINE

## 2021-02-24 PROCEDURE — G0463 HOSPITAL OUTPT CLINIC VISIT: HCPCS | Performed by: FAMILY MEDICINE

## 2021-02-24 PROCEDURE — 1100F PTFALLS ASSESS-DOCD GE2>/YR: CPT | Performed by: FAMILY MEDICINE

## 2021-02-24 PROCEDURE — G8536 NO DOC ELDER MAL SCRN: HCPCS | Performed by: FAMILY MEDICINE

## 2021-02-24 PROCEDURE — 2022F DILAT RTA XM EVC RTNOPTHY: CPT | Performed by: FAMILY MEDICINE

## 2021-02-24 PROCEDURE — 83036 HEMOGLOBIN GLYCOSYLATED A1C: CPT | Performed by: FAMILY MEDICINE

## 2021-02-24 PROCEDURE — G8399 PT W/DXA RESULTS DOCUMENT: HCPCS | Performed by: FAMILY MEDICINE

## 2021-02-24 PROCEDURE — 99213 OFFICE O/P EST LOW 20 MIN: CPT | Performed by: FAMILY MEDICINE

## 2021-02-24 PROCEDURE — G9899 SCRN MAM PERF RSLTS DOC: HCPCS | Performed by: FAMILY MEDICINE

## 2021-02-24 PROCEDURE — G8752 SYS BP LESS 140: HCPCS | Performed by: FAMILY MEDICINE

## 2021-02-24 RX ORDER — TRIAMCINOLONE ACETONIDE 1 MG/G
CREAM TOPICAL
COMMUNITY
Start: 2020-12-18

## 2021-02-24 RX ORDER — METFORMIN HYDROCHLORIDE 500 MG/1
500 TABLET, EXTENDED RELEASE ORAL 2 TIMES DAILY
COMMUNITY
End: 2021-04-28 | Stop reason: SDUPTHER

## 2021-02-24 NOTE — PROGRESS NOTES
Chief Complaint   Patient presents with    Cholesterol Problem     follow up    Hypertension     follow up           Mammogram 11/15/2019. Patient states she has an appt 3/2021. Bone density 11/15/2019    A1C 12/2/2020    Colonoscopy 5/20/2016 by Dr. Toribio Pablo repeat in 5 years. Eye exam 3/9/2020 by Dr. Jerrold Alpers. Patient states barrywill call and schedule an appt. 1. Have you been to the ER, urgent care clinic since your last visit? Hospitalized since your last visit? No    2. Have you seen or consulted any other health care providers outside of the [address] [place] [address] since your last visit? Include any pap smears or colon screening.  No

## 2021-02-24 NOTE — PROGRESS NOTES
HISTORY OF PRESENT ILLNESS  [place] is a 72 y.o. female. HPI   Follow up on chronic medical problems. Feeling well. Doing the precautionary measures at home to reduce risks of exposure COVID19. Also wearing mask when she is going out. No known sick contacts or known exposure to [address]. 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 Cardiovascular Review:  The patient has hypertension, hyperlipidemia and obesity. H/o PAF and SVT. S/p ablation by cardiology. Diet and Lifestyle: generally follows a low fat low cholesterol diet, generally follows a low sodium diet, follows a diabetic diet regularly, exercises sporadically but has started back with more regular walking. Pertinent ROS: taking medications as instructed, no medication side effects noted, no TIA's, no chest pain on exertion, no dyspnea on exertion, no swelling of ankles. She has been intolerant to the statins for her cholesterol. Diabetes Mellitus:  ompliant w/ meds, diabetic diet, and exercise. Obtains home glucose monitoring averaging 100-140. Checks BS daily. Pt does have BS log at visit today and was reviewed. No Rf needed for today. Denies any tingling sensation, polyuria and polydipsia. No blurred vision. Weight is down by 11 pounds. diabetic ROS - diabetic diet compliance: compliant most of the time, further diabetic ROS: no polyuria or polydipsia, no chest pain, dyspnea or TIA's, no numbness, tingling or pain in extremities, no unusual visual symptoms. Lab review: orders written for new lab studies as appropriate; see orders. Depression Review:  Patient is seen for followup of depression. Treatment includes Wellbutrin, Lexapro. Ongoing symptoms include none, feeling great. She denies depressed mood, insomnia and fatigue. She experiences the following side effects from the treatment: none.     Patient Active Problem List   Diagnosis Code    Depression F32.9    Anxiety F41.9    Hypercholesterolemia E78.00    Renal artery stenosis (HCC) I70.1    History of hepatitis B Z86.19    Glucose intolerance (impaired glucose tolerance) R73.02    Essential hypertension, benign I5    Encounter for medication monitoring Z51.81    H/O atrial fibrillation without current medication Z86.79    Asthmatic bronchitis J45.909    Environmental and seasonal allergies J30.89    Severe obesity (BMI 35.0-39. 9) with comorbidity ([facility] [address] E66.01       Current Outpatient Medications   Medication Sig Dispense Refill    Blood-Glucose Meter monitoring kit Please provide meter that is covered by insurance. E11.9 1 Kit 0    glucose blood VI test strips (blood glucose test) strip Please provide test strips that insurance will cover to test BS daily, E11.9 100 Strip 3    lancets misc Test Blood Sugar daily, E11.9 100 Each 3    metFORMIN ER (GLUCOPHAGE XR) 500 mg tablet Take 1 Tab by mouth daily (with dinner). Take one tablet daily with dinner for 1 week then increase to 1 tablet twice a day with breakfast and dinner. 60 Tab 5    atorvastatin (LIPITOR) 20 mg tablet Take 1 Tab by mouth nightly. 30 Tab 5    buPROPion XL (WELLBUTRIN XL) 300 mg XL tablet TAKE 1 TABLET EVERY MORNING 90 Tab 3    escitalopram oxalate (LEXAPRO) 20 mg tablet TAKE 1 TABLET DAILY 90 Tab 3    losartan (COZAAR) 50 mg tablet TAKE 1 TABLET DAILY 90 Tab 3    pantoprazole (PROTONIX) 40 mg tablet Take 1 Tab by mouth daily. 90 Tab 3    metoprolol succinate (TOPROL-XL) 100 mg tablet Take 1 Tab by mouth daily. 90 Tab 3    montelukast (SINGULAIR) 10 mg tablet Take 10 mg by mouth daily.  albuterol (PROVENTIL VENTOLIN) 2.5 mg /3 mL (0.083 %) nebulizer solution 3 mL by Nebulization route every four (4) hours as needed for Wheezing. 24 Each 11    albuterol (PROVENTIL HFA, VENTOLIN HFA, PROAIR HFA) 90 mcg/actuation inhaler Take 1-2 Puffs by inhalation every four (4) hours as needed for Wheezing.  1 Inhaler 11    cinnamon bark (CINNAMON) 500 mg cap Take 500 mg by mouth two (2) times a day.  Cholecalciferol, Vitamin D3, (VITAMIN D) 2,000 unit Cap Take 1 Tab by mouth daily.  MULTIVITAMIN PO Take 1 Tab by mouth daily. Allergies   Allergen Reactions    Iodinated Contrast Media Swelling    Clindamycin Nausea and Vomiting    Codeine Other (comments)     Headaches      Morphine Other (comments)     headache       Past Medical History:   Diagnosis Date    Anxiety     Arrhythmia     hx A-Fib    Arthritis     knees    Asthma     Depression     History of hepatitis B     Hypercholesterolemia     Hypertension     Renal artery stenosis ([address] 2006       Past Surgical History:   Procedure Laterality Date    HX BLEPHAROPLASTY  04/26/2016    HX CATARACT REMOVAL  12/2019    right    HX CATARACT REMOVAL  01/2020    left     HX COLONOSCOPY  05/20/2016    \"    HX HYSTERECTOMY  1988    HX KNEE REPLACEMENT  05/22/2018    right knee    HX ORTHOPAEDIC  05/21/2020    right thumb    [place] CARDIAC SURG PROCEDURE UNLIST  4/28/2015    cardiac ablation     [place] COLONOSCOPY FLX DX W/COLLJ SPEC WHEN PFRMD  12/01/2010    dr Nora Monroy       Family History   Problem Relation Age of Onset   Fuentes Cancer Mother         multiple sites    Cancer Father         lung    Heart Disease Father     Heart Disease Brother     Heart Attack Brother     Lung Disease Brother         COPD    Other Sister         gastroparesis    Hypertension Brother     Elevated Lipids Brother        Social History     Tobacco Use    Smoking status: Never Smoker    Smokeless tobacco: Never Used   Substance Use Topics    Alcohol use:  Yes     Alcohol/week: 0.0 standard drinks     Comment: socially        Lab Results   Component Value Date/Time    WBC 5.6 12/02/2020 09:36 AM    HGB 14.8 12/02/2020 09:36 AM    HCT 44.9 12/02/2020 09:36 AM    PLATELET 349 96/78/9590 09:36 AM    MCV 97 12/02/2020 09:36 AM     Lab Results   Component Value Date/Time    Cholesterol, total 277 (H) 12/02/2020 09:36 AM HDL Cholesterol 51 12/02/2020 09:36 AM    LDL, calculated 189 (H) 12/02/2020 09:36 AM    LDL, calculated 99 12/11/2019 08:42 AM    Triglyceride 195 (H) 12/02/2020 09:36 AM     Lab Results   Component Value Date/Time    TSH 3.880 10/26/2016 07:50 AM    T4, Free 1.14 01/28/2015 02:43 PM      Lab Results   Component Value Date/Time    Sodium 140 12/02/2020 09:36 AM    Potassium 4.5 12/02/2020 09:36 AM    Chloride 101 12/02/2020 09:36 AM    CO2 26 12/02/2020 09:36 AM    Glucose 167 (H) 12/02/2020 09:36 AM    BUN 13 12/02/2020 09:36 AM    Creatinine 1.04 (H) 12/02/2020 09:36 AM    BUN/Creatinine ratio 13 12/02/2020 09:36 AM    GFR est AA 63 12/02/2020 09:36 AM    GFR est non-AA 55 (L) 12/02/2020 09:36 AM    Calcium 9.7 12/02/2020 09:36 AM    Bilirubin, total 0.6 12/02/2020 09:36 AM    ALT (SGPT) 39 (H) 12/02/2020 09:36 AM    Alk. phosphatase 114 12/02/2020 09:36 AM    Protein, total 6.8 12/02/2020 09:36 AM    Albumin 4.1 12/02/2020 09:36 AM    A-G Ratio 1.5 12/02/2020 09:36 AM      Lab Results   Component Value Date/Time    Hemoglobin A1c 6.5 (H) 12/02/2020 09:36 AM    Hemoglobin A1c (POC) 5.9 12/11/2019 08:42 AM         Review of Systems   Constitutional: Negative for malaise/fatigue. HENT: Negative for congestion. Eyes: Negative for blurred vision. Respiratory: Negative for cough and shortness of breath. Cardiovascular: Negative for chest pain, palpitations and leg swelling. Gastrointestinal: Negative for abdominal pain, constipation and heartburn. Genitourinary: Negative for dysuria, frequency and urgency. Musculoskeletal: Negative for back pain and joint pain. Neurological: Negative for dizziness, tingling and headaches. Endo/Heme/Allergies: Negative for environmental allergies. Psychiatric/Behavioral: Negative for depression. The patient does not have insomnia. Physical Exam  Vitals signs and nursing note reviewed. Constitutional:       Appearance: Normal appearance.  She is well-developed. Comments: /72 (BP 1 Location: Left arm, BP Patient Position: Sitting)   Pulse 66   Temp 98 °F (36.7 °C) (Oral)   Resp 16   Ht 5' 8\" (1.727 m)   Wt 242 lb 6.4 oz (110 kg)   SpO2 97%   BMI 36.86 kg/m²    HENT:      Right Ear: Tympanic membrane and ear canal normal.      Left Ear: Tympanic membrane and ear canal normal.      Nose: No mucosal edema or rhinorrhea. Neck:      Musculoskeletal: Normal range of motion and neck supple. Thyroid: No thyromegaly. Cardiovascular:      Rate and Rhythm: Normal rate and regular rhythm. Heart sounds: Normal heart sounds. No gallop. Pulmonary:      Effort: Pulmonary effort is normal.      Breath sounds: Normal breath sounds. Abdominal:      General: Bowel sounds are normal.      Palpations: Abdomen is soft. There is no mass. Tenderness: There is no abdominal tenderness. Musculoskeletal: Normal range of motion. Right lower leg: No edema. Left lower leg: No edema. Lymphadenopathy:      Cervical: No cervical adenopathy. Skin:     General: Skin is warm and dry. Neurological:      General: No focal deficit present. Mental Status: She is alert and oriented to person, place, and time. Psychiatric:         Mood and Affect: Mood normal.         ASSESSMENT and PLAN  Diagnoses and all orders for this visit:    1. Essential hypertension, benign  Stable at goal.    Discussed sodium restriction, high k rich diet, maintaining ideal body weight and regular exercise program such as daily walking 30 min perday 4-5 times per week, as physiologic means to achieve blood pressure control. Medication compliance advised. 2. Type 2 diabetes mellitus without complication, without long-term current use of insulin ([address]  Excellent progress with her weight and change with her diet. Continue to monitor. Work on diet and exercise. -     AMB POC HEMOGLOBIN A1C  -     AMB POC GLUCOSE, QUANTITATIVE, BLOOD    3. Hypercholesterolemia  Tolerating the lipitor. Continue to monitor. Work on diet and exercise.  -     LIPID PANEL    4. Mild depression ([address]  Stale on current regimen    5. Gastroesophageal reflux disease without esophagitis  Stable on protonin    6. Encounter for medication monitoring  -     METABOLIC PANEL, COMPREHENSIVE  -     CK    7. Non morbid obesity  I have reviewed/discussed the above normal BMI with the patient. I have recommended the following interventions: dietary management education, guidance, and counseling . Follow-up and Dispositions    · Return in about 5 months (around 7/24/2021). reviewed diet, exercise and weight control  cardiovascular risk and specific lipid/LDL goals reviewed  reviewed medications and side effects in detail  specific diabetic recommendations: low cholesterol diet, weight control and daily exercise discussed, home glucose monitoring emphasized, all medications, side effects and compliance discussed carefully, foot care discussed and Podiatry visits discussed, annual eye examinations at Ophthalmology discussed and glycohemoglobin and other lab monitoring discussed     I have discussed diagnosis listed in this note with pt and/or family. I have discussed treatment plans and options and the risk/benefit analysis of those options, including safe use of medications and possible medication side effects. Through the use of shared decision making we have agreed to the above plan. The patient has received an after-visit summary and questions were answered concerning future plans and follow up. Advise pt of any urgent changes then to proceed to the ER.

## 2021-02-25 ENCOUNTER — TELEPHONE (OUTPATIENT)
Dept: FAMILY MEDICINE CLINIC | Age: 71
End: 2021-02-25

## 2021-02-25 LAB
ALBUMIN SERPL-MCNC: 3.7 G/DL (ref 3.8–4.8)
ALBUMIN/GLOB SERPL: 1.4 {RATIO} (ref 1.2–2.2)
ALP SERPL-CCNC: 101 IU/L (ref 39–117)
ALT SERPL-CCNC: 32 IU/L (ref 0–32)
AST SERPL-CCNC: 25 IU/L (ref 0–40)
BILIRUB SERPL-MCNC: 0.3 MG/DL (ref 0–1.2)
BUN SERPL-MCNC: 14 MG/DL (ref 8–27)
BUN/CREAT SERPL: 14 (ref 12–28)
CALCIUM SERPL-MCNC: 9 MG/DL (ref 8.7–10.3)
CHLORIDE SERPL-SCNC: 103 MMOL/L (ref 96–106)
CHOLEST SERPL-MCNC: 143 MG/DL (ref 100–199)
CK SERPL-CCNC: 69 U/L (ref 32–182)
CO2 SERPL-SCNC: 26 MMOL/L (ref 20–29)
CREAT SERPL-MCNC: 1 MG/DL (ref 0.57–1)
GLOBULIN SER CALC-MCNC: 2.7 G/DL (ref 1.5–4.5)
GLUCOSE SERPL-MCNC: 131 MG/DL (ref 65–99)
HDLC SERPL-MCNC: 48 MG/DL
INTERPRETATION, 910389: NORMAL
INTERPRETATION: NORMAL
LDLC SERPL CALC-MCNC: 75 MG/DL (ref 0–99)
PDF IMAGE, 910387: NORMAL
POTASSIUM SERPL-SCNC: 3.6 MMOL/L (ref 3.5–5.2)
PROT SERPL-MCNC: 6.4 G/DL (ref 6–8.5)
SODIUM SERPL-SCNC: 145 MMOL/L (ref 134–144)
TRIGL SERPL-MCNC: 112 MG/DL (ref 0–149)
VLDLC SERPL CALC-MCNC: 20 MG/DL (ref 5–40)

## 2021-03-23 ENCOUNTER — TELEPHONE (OUTPATIENT)
Dept: FAMILY MEDICINE CLINIC | Age: 71
End: 2021-03-23

## 2021-03-23 NOTE — TELEPHONE ENCOUNTER
Festus Hamiltno, from [facility] called regarding patient. They are doing surgery on the patient 4/15. They need a copy of the EKG, and last office notes. Please call @[phone]. Opt 1  Fax [phone].

## 2021-03-23 NOTE — TELEPHONE ENCOUNTER
Called Xochitl from [employer] concerning Pre Op information, faxed last EKG and last office notes to [phone]

## 2021-04-28 DIAGNOSIS — F32.A DEPRESSION, UNSPECIFIED DEPRESSION TYPE: ICD-10-CM

## 2021-04-28 DIAGNOSIS — I47.1 PAROXYSMAL SVT (SUPRAVENTRICULAR TACHYCARDIA) (HCC): ICD-10-CM

## 2021-04-28 DIAGNOSIS — J45.20 MILD INTERMITTENT ASTHMA WITHOUT COMPLICATION: ICD-10-CM

## 2021-04-28 DIAGNOSIS — E11.9 TYPE 2 DIABETES MELLITUS WITHOUT COMPLICATION, WITHOUT LONG-TERM CURRENT USE OF INSULIN (HCC): ICD-10-CM

## 2021-04-28 DIAGNOSIS — K21.9 GASTROESOPHAGEAL REFLUX DISEASE WITHOUT ESOPHAGITIS: ICD-10-CM

## 2021-04-28 RX ORDER — METOPROLOL SUCCINATE 100 MG/1
100 TABLET, EXTENDED RELEASE ORAL DAILY
Qty: 90 TAB | Refills: 3 | Status: SHIPPED | OUTPATIENT
Start: 2021-04-28 | End: 2022-02-15 | Stop reason: SDUPTHER

## 2021-04-28 RX ORDER — ATORVASTATIN CALCIUM 20 MG/1
20 TABLET, FILM COATED ORAL
Qty: 90 TAB | Refills: 3 | Status: SHIPPED | OUTPATIENT
Start: 2021-04-28 | End: 2021-12-27 | Stop reason: SDDI

## 2021-04-28 RX ORDER — MONTELUKAST SODIUM 10 MG/1
10 TABLET ORAL DAILY
Qty: 90 TAB | Refills: 3 | Status: SHIPPED | OUTPATIENT
Start: 2021-04-28 | End: 2021-12-27 | Stop reason: ALTCHOICE

## 2021-04-28 RX ORDER — PANTOPRAZOLE SODIUM 40 MG/1
40 TABLET, DELAYED RELEASE ORAL DAILY
Qty: 90 TAB | Refills: 3 | Status: SHIPPED | OUTPATIENT
Start: 2021-04-28 | End: 2022-02-15 | Stop reason: SDUPTHER

## 2021-04-28 RX ORDER — BUPROPION HYDROCHLORIDE 300 MG/1
TABLET ORAL
Qty: 90 TAB | Refills: 3 | Status: SHIPPED | OUTPATIENT
Start: 2021-04-28 | End: 2022-02-15 | Stop reason: SDUPTHER

## 2021-04-28 RX ORDER — ESCITALOPRAM OXALATE 20 MG/1
TABLET ORAL
Qty: 90 TAB | Refills: 3 | Status: SHIPPED | OUTPATIENT
Start: 2021-04-28 | End: 2022-02-15 | Stop reason: SDUPTHER

## 2021-04-28 RX ORDER — LOSARTAN POTASSIUM 50 MG/1
TABLET ORAL
Qty: 90 TAB | Refills: 3 | Status: SHIPPED | OUTPATIENT
Start: 2021-04-28 | End: 2022-02-15 | Stop reason: SDUPTHER

## 2021-04-28 RX ORDER — METFORMIN HYDROCHLORIDE 500 MG/1
500 TABLET, EXTENDED RELEASE ORAL 2 TIMES DAILY
Qty: 180 TAB | Refills: 3 | Status: SHIPPED | OUTPATIENT
Start: 2021-04-28 | End: 2022-02-15 | Stop reason: SDUPTHER

## 2021-06-01 ENCOUNTER — OFFICE VISIT (OUTPATIENT)
Dept: FAMILY MEDICINE CLINIC | Age: 71
End: 2021-06-01
Payer: MEDICARE

## 2021-06-01 VITALS
RESPIRATION RATE: 16 BRPM | TEMPERATURE: 97.8 F | DIASTOLIC BLOOD PRESSURE: 61 MMHG | BODY MASS INDEX: 35.13 KG/M2 | HEART RATE: 66 BPM | SYSTOLIC BLOOD PRESSURE: 116 MMHG | OXYGEN SATURATION: 96 % | HEIGHT: 68 IN | WEIGHT: 231.8 LBS

## 2021-06-01 DIAGNOSIS — E11.9 TYPE 2 DIABETES MELLITUS WITHOUT COMPLICATION, WITHOUT LONG-TERM CURRENT USE OF INSULIN (HCC): ICD-10-CM

## 2021-06-01 DIAGNOSIS — K21.9 GASTROESOPHAGEAL REFLUX DISEASE WITHOUT ESOPHAGITIS: ICD-10-CM

## 2021-06-01 DIAGNOSIS — I10 ESSENTIAL HYPERTENSION, BENIGN: Primary | ICD-10-CM

## 2021-06-01 DIAGNOSIS — I47.1 PAROXYSMAL SVT (SUPRAVENTRICULAR TACHYCARDIA) (HCC): ICD-10-CM

## 2021-06-01 DIAGNOSIS — Z51.81 ENCOUNTER FOR MEDICATION MONITORING: ICD-10-CM

## 2021-06-01 DIAGNOSIS — E66.9 NON MORBID OBESITY: ICD-10-CM

## 2021-06-01 DIAGNOSIS — F32.A MILD DEPRESSION: ICD-10-CM

## 2021-06-01 DIAGNOSIS — E78.00 HYPERCHOLESTEROLEMIA: ICD-10-CM

## 2021-06-01 PROCEDURE — 1100F PTFALLS ASSESS-DOCD GE2>/YR: CPT | Performed by: FAMILY MEDICINE

## 2021-06-01 PROCEDURE — 2022F DILAT RTA XM EVC RTNOPTHY: CPT | Performed by: FAMILY MEDICINE

## 2021-06-01 PROCEDURE — G0463 HOSPITAL OUTPT CLINIC VISIT: HCPCS | Performed by: FAMILY MEDICINE

## 2021-06-01 PROCEDURE — 1090F PRES/ABSN URINE INCON ASSESS: CPT | Performed by: FAMILY MEDICINE

## 2021-06-01 PROCEDURE — G8536 NO DOC ELDER MAL SCRN: HCPCS | Performed by: FAMILY MEDICINE

## 2021-06-01 PROCEDURE — G8399 PT W/DXA RESULTS DOCUMENT: HCPCS | Performed by: FAMILY MEDICINE

## 2021-06-01 PROCEDURE — G8754 DIAS BP LESS 90: HCPCS | Performed by: FAMILY MEDICINE

## 2021-06-01 PROCEDURE — G8417 CALC BMI ABV UP PARAM F/U: HCPCS | Performed by: FAMILY MEDICINE

## 2021-06-01 PROCEDURE — G8752 SYS BP LESS 140: HCPCS | Performed by: FAMILY MEDICINE

## 2021-06-01 PROCEDURE — 3044F HG A1C LEVEL LT 7.0%: CPT | Performed by: FAMILY MEDICINE

## 2021-06-01 PROCEDURE — 99214 OFFICE O/P EST MOD 30 MIN: CPT | Performed by: FAMILY MEDICINE

## 2021-06-01 PROCEDURE — G8427 DOCREV CUR MEDS BY ELIG CLIN: HCPCS | Performed by: FAMILY MEDICINE

## 2021-06-01 PROCEDURE — 3017F COLORECTAL CA SCREEN DOC REV: CPT | Performed by: FAMILY MEDICINE

## 2021-06-01 PROCEDURE — G9899 SCRN MAM PERF RSLTS DOC: HCPCS | Performed by: FAMILY MEDICINE

## 2021-06-01 PROCEDURE — G9717 DOC PT DX DEP/BP F/U NT REQ: HCPCS | Performed by: FAMILY MEDICINE

## 2021-06-01 PROCEDURE — 3288F FALL RISK ASSESSMENT DOCD: CPT | Performed by: FAMILY MEDICINE

## 2021-06-01 NOTE — PROGRESS NOTES
Chief Complaint   Patient presents with    Hypertension     follow up    Cholesterol Problem     1. Have you been to the ER, urgent care clinic since your last visit? Hospitalized since your last visit? no    2. Have you seen or consulted any other health care providers outside of the [address] [place] [address] since your last visit? Include any pap smears or colon screening.   Dr Hoyt Malcolm surgery on left wrist    Mammogram 3/15/21    GYN  3/15/21    Bone density 11/5/19    Colonoscopy  5/20/16  Repeat in 5 years Jose Raul Alt

## 2021-06-01 NOTE — PROGRESS NOTES
HISTORY OF PRESENT ILLNESS  Felicia Wiggins is a 72 y.o. female. HPI   Follow up on chronic medical problems. Feeling well. Doing the precautionary measures at home to reduce risks of exposure COVID19. Also wearing mask when she is going out. No known sick contacts or known exposure to [address]. 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 Cardiovascular Review:  The patient has hypertension, hyperlipidemia and obesity. H/o PAF and SVT. S/p ablation by cardiology. Diet and Lifestyle: generally follows a low fat low cholesterol diet, generally follows a low sodium diet, follows a diabetic diet regularly, exercises sporadically but has started back with more regular walking. Pertinent ROS: taking medications as instructed, no medication side effects noted, no TIA's, no chest pain on exertion, no dyspnea on exertion, no swelling of ankles. She has been intolerant to the statins for her cholesterol. Diabetes Mellitus:  Compliant w/ meds, diabetic diet, and exercise. Denies any tingling sensation, polyuria and polydipsia. No blurred vision. Weight is down by  32 pounds in the past year. Diabetic ROS - diabetic diet compliance: compliant most of the time, further diabetic ROS: no polyuria or polydipsia, no chest pain, dyspnea or TIA's, no numbness, tingling or pain in extremities, no unusual visual symptoms. Lab review: orders written for new lab studies as appropriate; see orders. Depression Review:  Patient is seen for followup of depression. Treatment includes Wellbutrin, Lexapro. Ongoing symptoms include none, feeling great. She denies depressed mood, insomnia and fatigue. She experiences the following side effects from the treatment: none.     Patient Active Problem List   Diagnosis Code    Depression F32.9    Anxiety F41.9    Hypercholesterolemia E78.00    Renal artery stenosis (HCC) I70.1    History of hepatitis B Z86.19    Glucose intolerance (impaired glucose tolerance) R73.02    Essential hypertension, benign I5    Encounter for medication monitoring Z51.81    H/O atrial fibrillation without current medication Z86.79    Asthmatic bronchitis J45.909    Environmental and seasonal allergies J30.89    Severe obesity (BMI 35.0-39. 9) with comorbidity (HCC) E66.01       Current Outpatient Medications   Medication Sig Dispense Refill    metFORMIN ER (glucophage XR) 500 mg tablet Take 1 Tab by mouth two (2) times a day. 180 Tab 3    atorvastatin (LIPITOR) 20 mg tablet Take 1 Tab by mouth nightly. 90 Tab 3    buPROPion XL (WELLBUTRIN XL) 300 mg XL tablet TAKE 1 TABLET EVERY MORNING 90 Tab 3    escitalopram oxalate (LEXAPRO) 20 mg tablet TAKE 1 TABLET DAILY 90 Tab 3    losartan (COZAAR) 50 mg tablet TAKE 1 TABLET DAILY 90 Tab 3    pantoprazole (PROTONIX) 40 mg tablet Take 1 Tab by mouth daily. 90 Tab 3    metoprolol succinate (TOPROL-XL) 100 mg tablet Take 1 Tab by mouth daily. 90 Tab 3    montelukast (Singulair) 10 mg tablet Take 1 Tab by mouth daily. 90 Tab 3    triamcinolone acetonide (KENALOG) 0.1 % topical cream APPLY CREAM EXTERNALLY TWICE DAILY AS NEEDED TO RASH FOR FLARE UP      Blood-Glucose Meter monitoring kit Please provide meter that is covered by insurance. E11.9 1 Kit 0    glucose blood VI test strips (blood glucose test) strip Please provide test strips that insurance will cover to test BS daily, E11.9 100 Strip 3    lancets misc Test Blood Sugar daily, E11.9 100 Each 3    albuterol (PROVENTIL VENTOLIN) 2.5 mg /3 mL (0.083 %) nebulizer solution 3 mL by Nebulization route every four (4) hours as needed for Wheezing. 24 Each 11    albuterol (PROVENTIL HFA, VENTOLIN HFA, PROAIR HFA) 90 mcg/actuation inhaler Take 1-2 Puffs by inhalation every four (4) hours as needed for Wheezing. 1 Inhaler 11    cinnamon bark (CINNAMON) 500 mg cap Take 500 mg by mouth two (2) times a day.       Cholecalciferol, Vitamin D3, (VITAMIN D) 2,000 unit Cap Take 1 Tab by mouth daily.  MULTIVITAMIN PO Take 1 Tab by mouth daily. Allergies   Allergen Reactions    Iodinated Contrast Media Swelling    Clindamycin Nausea and Vomiting    Codeine Other (comments)     Headaches      Morphine Other (comments)     headache         Past Medical History:   Diagnosis Date    Anxiety     Arrhythmia     hx A-Fib    Arthritis     knees    Asthma     Depression     History of hepatitis B     Hypercholesterolemia     Hypertension     Renal artery stenosis ([address] 2006         Past Surgical History:   Procedure Laterality Date    HX BLEPHAROPLASTY  04/26/2016    HX CATARACT REMOVAL  12/2019    right    HX CATARACT REMOVAL  01/2020    left     HX COLONOSCOPY  05/20/2016    \"    HX HYSTERECTOMY  1988    HX KNEE REPLACEMENT  05/22/2018    right knee    HX ORTHOPAEDIC  05/21/2020    right thumb    [place] CARDIAC SURG PROCEDURE UNLIST  4/28/2015    cardiac ablation     [place] COLONOSCOPY FLX DX W/COLLJ SPEC WHEN PFRMD  12/01/2010    dr Catalino Thacker         Family History   Problem Relation Age of Onset   Colt Dave Cancer Mother         multiple sites    Cancer Father         lung    Heart Disease Father     Heart Disease Brother     Heart Attack Brother     Lung Disease Brother         COPD    Other Sister         gastroparesis    Hypertension Brother     Elevated Lipids Brother        Social History     Tobacco Use    Smoking status: Never Smoker    Smokeless tobacco: Never Used   Substance Use Topics    Alcohol use:  Yes     Alcohol/week: 0.0 standard drinks     Comment: socially        Lab Results   Component Value Date/Time    WBC 5.6 12/02/2020 09:36 AM    HGB 14.8 12/02/2020 09:36 AM    HCT 44.9 12/02/2020 09:36 AM    PLATELET 048 46/88/1910 09:36 AM    MCV 97 12/02/2020 09:36 AM     Lab Results   Component Value Date/Time    Cholesterol, total 143 02/24/2021 08:50 AM    HDL Cholesterol 48 02/24/2021 08:50 AM    LDL, calculated 75 02/24/2021 08:50 AM    LDL, calculated 99 12/11/2019 08:42 AM    Triglyceride 112 02/24/2021 08:50 AM     Lab Results   Component Value Date/Time    TSH 3.880 10/26/2016 07:50 AM    T4, Free 1.14 01/28/2015 02:43 PM      Lab Results   Component Value Date/Time    Sodium 145 (H) 02/24/2021 08:50 AM    Potassium 3.6 02/24/2021 08:50 AM    Chloride 103 02/24/2021 08:50 AM    CO2 26 02/24/2021 08:50 AM    Glucose 131 (H) 02/24/2021 08:50 AM    BUN 14 02/24/2021 08:50 AM    Creatinine 1.00 02/24/2021 08:50 AM    BUN/Creatinine ratio 14 02/24/2021 08:50 AM    GFR est AA 66 02/24/2021 08:50 AM    GFR est non-AA 58 (L) 02/24/2021 08:50 AM    Calcium 9.0 02/24/2021 08:50 AM    Bilirubin, total 0.3 02/24/2021 08:50 AM    ALT (SGPT) 32 02/24/2021 08:50 AM    Alk. phosphatase 101 02/24/2021 08:50 AM    Protein, total 6.4 02/24/2021 08:50 AM    Albumin 3.7 (L) 02/24/2021 08:50 AM    A-G Ratio 1.4 02/24/2021 08:50 AM      Lab Results   Component Value Date/Time    Hemoglobin A1c 6.5 (H) 12/02/2020 09:36 AM    Hemoglobin A1c (POC) 5.6 02/24/2021 08:50 AM         Review of Systems   Constitutional: Negative for malaise/fatigue. HENT: Negative for congestion. Eyes: Negative for blurred vision. Respiratory: Negative for cough and shortness of breath. Cardiovascular: Negative for chest pain, palpitations and leg swelling. Gastrointestinal: Negative for abdominal pain, constipation and heartburn. Genitourinary: Negative for dysuria, frequency and urgency. Musculoskeletal: Negative for back pain and joint pain. Neurological: Negative for dizziness, tingling and headaches. Endo/Heme/Allergies: Negative for environmental allergies. Psychiatric/Behavioral: Negative for depression. The patient does not have insomnia. Physical Exam  Vitals and nursing note reviewed. Constitutional:       Appearance: Normal appearance. She is well-developed.       Comments: /61   Pulse 66   Temp 97.8 °F (36.6 °C) (Oral)   Resp 16   Ht 5' 8\" (1.727 m)   Wt 231 lb 12.8 oz (105.1 kg)   SpO2 96%   BMI 35.25 kg/m²      HENT:      Right Ear: Tympanic membrane and ear canal normal.      Left Ear: Tympanic membrane and ear canal normal.      Nose: No mucosal edema. Neck:      Thyroid: No thyromegaly. Cardiovascular:      Rate and Rhythm: Normal rate and regular rhythm. Heart sounds: Normal heart sounds. No gallop. Pulmonary:      Effort: Pulmonary effort is normal.      Breath sounds: Normal breath sounds. Abdominal:      General: Bowel sounds are normal.      Palpations: Abdomen is soft. There is no mass. Tenderness: There is no abdominal tenderness. Musculoskeletal:         General: Normal range of motion. Cervical back: Normal range of motion and neck supple. Right lower leg: No edema. Left lower leg: No edema. Lymphadenopathy:      Cervical: No cervical adenopathy. Skin:     General: Skin is warm and dry. Neurological:      General: No focal deficit present. Mental Status: She is alert and oriented to person, place, and time. Psychiatric:         Mood and Affect: Mood normal.         ASSESSMENT and PLAN  Diagnoses and all orders for this visit:    1. Essential hypertension, benign  Stable     2. Type 2 diabetes mellitus without complication, without long-term current use of insulin (HCC)  Continue to monitor. Work on diet and exercise.  -     HEMOGLOBIN A1C WITH EAG; Future    3. Hypercholesterolemia  Continue to monitor. Work on diet and exercise.  -     LIPID PANEL; Future    4. Paroxysmal SVT (supraventricular tachycardia) (HCC)  Stable     5. Gastroesophageal reflux disease without esophagitis  Stable on protonix    6. Mild depression (HCC)  Stable     7. Encounter for medication monitoring  -     METABOLIC PANEL, COMPREHENSIVE; Future  -     CK; Future    8. Non morbid obesity  Congratulated on her weight loss progress.         Follow-up and Dispositions    · Return in about 6 months (around 12/1/2021) for medicare wellness exam.       current treatment plan is effective, no change in therapy  reviewed diet, exercise and weight control  cardiovascular risk and specific lipid/LDL goals reviewed  reviewed medications and side effects in detail  specific diabetic recommendations: low cholesterol diet, weight control and daily exercise discussed and glycohemoglobin and other lab monitoring discussed

## 2021-06-02 ENCOUNTER — TELEPHONE (OUTPATIENT)
Dept: FAMILY MEDICINE CLINIC | Age: 71
End: 2021-06-02

## 2021-06-02 LAB
ALBUMIN SERPL-MCNC: 4.4 G/DL (ref 3.8–4.8)
ALBUMIN/GLOB SERPL: 2 {RATIO} (ref 1.2–2.2)
ALP SERPL-CCNC: 122 IU/L (ref 48–121)
ALT SERPL-CCNC: 22 IU/L (ref 0–32)
AST SERPL-CCNC: 20 IU/L (ref 0–40)
BILIRUB SERPL-MCNC: 0.5 MG/DL (ref 0–1.2)
BUN SERPL-MCNC: 15 MG/DL (ref 8–27)
BUN/CREAT SERPL: 16 (ref 12–28)
CALCIUM SERPL-MCNC: 9.7 MG/DL (ref 8.7–10.3)
CHLORIDE SERPL-SCNC: 103 MMOL/L (ref 96–106)
CHOLEST SERPL-MCNC: 181 MG/DL (ref 100–199)
CK SERPL-CCNC: 75 U/L (ref 32–182)
CO2 SERPL-SCNC: 28 MMOL/L (ref 20–29)
CREAT SERPL-MCNC: 0.95 MG/DL (ref 0.57–1)
EST. AVERAGE GLUCOSE BLD GHB EST-MCNC: 128 MG/DL
GLOBULIN SER CALC-MCNC: 2.2 G/DL (ref 1.5–4.5)
GLUCOSE SERPL-MCNC: 122 MG/DL (ref 65–99)
HBA1C MFR BLD: 6.1 % (ref 4.8–5.6)
HDLC SERPL-MCNC: 60 MG/DL
IMP & REVIEW OF LAB RESULTS: NORMAL
LDLC SERPL CALC-MCNC: 96 MG/DL (ref 0–99)
POTASSIUM SERPL-SCNC: 5 MMOL/L (ref 3.5–5.2)
PROT SERPL-MCNC: 6.6 G/DL (ref 6–8.5)
SODIUM SERPL-SCNC: 145 MMOL/L (ref 134–144)
TRIGL SERPL-MCNC: 141 MG/DL (ref 0–149)
VLDLC SERPL CALC-MCNC: 25 MG/DL (ref 5–40)

## 2021-06-02 NOTE — TELEPHONE ENCOUNTER
----- Message from Morelia Salazar MD sent at 6/2/2021  3:07 PM EDT -----  Please call and let her know that her a1c level is stable at 6.1%

## 2021-10-01 ENCOUNTER — HOSPITAL ENCOUNTER (OUTPATIENT)
Dept: PREADMISSION TESTING | Age: 71
Discharge: HOME | End: 2021-10-01
Payer: MEDICARE

## 2021-10-01 ENCOUNTER — TRANSCRIBE ORDER (OUTPATIENT)
Dept: REGISTRATION | Age: 71
End: 2021-10-01

## 2021-10-01 DIAGNOSIS — Z01.812 PRE-PROCEDURE LAB EXAM: Primary | ICD-10-CM

## 2021-10-01 DIAGNOSIS — Z01.812 PRE-PROCEDURE LAB EXAM: ICD-10-CM

## 2021-10-01 PROCEDURE — U0005 INFEC AGEN DETEC AMPLI PROBE: HCPCS

## 2021-10-03 LAB
SARS-COV-2, XPLCVT: NOT DETECTED
SOURCE, COVRS: NORMAL

## 2021-10-05 ENCOUNTER — ANESTHESIA EVENT (OUTPATIENT)
Dept: ENDOSCOPY | Age: 71
End: 2021-10-05
Payer: MEDICARE

## 2021-10-05 ENCOUNTER — ANESTHESIA (OUTPATIENT)
Dept: ENDOSCOPY | Age: 71
End: 2021-10-05
Payer: MEDICARE

## 2021-10-05 ENCOUNTER — HOSPITAL ENCOUNTER (OUTPATIENT)
Age: 71
Setting detail: OUTPATIENT SURGERY
Discharge: HOME | End: 2021-10-05
Attending: SPECIALIST | Admitting: SPECIALIST
Payer: MEDICARE

## 2021-10-05 VITALS
DIASTOLIC BLOOD PRESSURE: 51 MMHG | TEMPERATURE: 98.3 F | WEIGHT: 229 LBS | OXYGEN SATURATION: 96 % | BODY MASS INDEX: 34.71 KG/M2 | HEART RATE: 58 BPM | RESPIRATION RATE: 14 BRPM | HEIGHT: 68 IN | SYSTOLIC BLOOD PRESSURE: 105 MMHG

## 2021-10-05 PROCEDURE — 74011000250 HC RX REV CODE- 250: Performed by: NURSE ANESTHETIST, CERTIFIED REGISTERED

## 2021-10-05 PROCEDURE — 76060000031 HC ANESTHESIA FIRST 0.5 HR: Performed by: SPECIALIST

## 2021-10-05 PROCEDURE — 74011250636 HC RX REV CODE- 250/636: Performed by: NURSE ANESTHETIST, CERTIFIED REGISTERED

## 2021-10-05 PROCEDURE — 76040000019: Performed by: SPECIALIST

## 2021-10-05 PROCEDURE — 77030013992 HC SNR POLYP ENDOSC BSC -B: Performed by: SPECIALIST

## 2021-10-05 PROCEDURE — 2709999900 HC NON-CHARGEABLE SUPPLY: Performed by: SPECIALIST

## 2021-10-05 PROCEDURE — 74011250636 HC RX REV CODE- 250/636: Performed by: SPECIALIST

## 2021-10-05 PROCEDURE — 88305 TISSUE EXAM BY PATHOLOGIST: CPT

## 2021-10-05 RX ORDER — SODIUM CHLORIDE 0.9 % (FLUSH) 0.9 %
5-40 SYRINGE (ML) INJECTION EVERY 8 HOURS
Status: DISCONTINUED | OUTPATIENT
Start: 2021-10-05 | End: 2021-10-05 | Stop reason: HOSPADM

## 2021-10-05 RX ORDER — EPINEPHRINE 0.1 MG/ML
1 INJECTION INTRACARDIAC; INTRAVENOUS
Status: DISCONTINUED | OUTPATIENT
Start: 2021-10-05 | End: 2021-10-05 | Stop reason: HOSPADM

## 2021-10-05 RX ORDER — FLUMAZENIL 0.1 MG/ML
0.2 INJECTION INTRAVENOUS
Status: DISCONTINUED | OUTPATIENT
Start: 2021-10-05 | End: 2021-10-05 | Stop reason: HOSPADM

## 2021-10-05 RX ORDER — SODIUM CHLORIDE 9 MG/ML
INJECTION, SOLUTION INTRAVENOUS
Status: DISCONTINUED | OUTPATIENT
Start: 2021-10-05 | End: 2021-10-05 | Stop reason: HOSPADM

## 2021-10-05 RX ORDER — PROPOFOL 10 MG/ML
INJECTION, EMULSION INTRAVENOUS AS NEEDED
Status: DISCONTINUED | OUTPATIENT
Start: 2021-10-05 | End: 2021-10-05 | Stop reason: HOSPADM

## 2021-10-05 RX ORDER — PHENYLEPHRINE HCL IN 0.9% NACL 0.4MG/10ML
SYRINGE (ML) INTRAVENOUS AS NEEDED
Status: DISCONTINUED | OUTPATIENT
Start: 2021-10-05 | End: 2021-10-05 | Stop reason: HOSPADM

## 2021-10-05 RX ORDER — SODIUM CHLORIDE 9 MG/ML
50 INJECTION, SOLUTION INTRAVENOUS CONTINUOUS
Status: DISCONTINUED | OUTPATIENT
Start: 2021-10-05 | End: 2021-10-05 | Stop reason: HOSPADM

## 2021-10-05 RX ORDER — LIDOCAINE HYDROCHLORIDE 20 MG/ML
INJECTION, SOLUTION EPIDURAL; INFILTRATION; INTRACAUDAL; PERINEURAL AS NEEDED
Status: DISCONTINUED | OUTPATIENT
Start: 2021-10-05 | End: 2021-10-05 | Stop reason: HOSPADM

## 2021-10-05 RX ORDER — SODIUM CHLORIDE 0.9 % (FLUSH) 0.9 %
5-40 SYRINGE (ML) INJECTION AS NEEDED
Status: DISCONTINUED | OUTPATIENT
Start: 2021-10-05 | End: 2021-10-05 | Stop reason: HOSPADM

## 2021-10-05 RX ORDER — ATROPINE SULFATE 0.1 MG/ML
0.5 INJECTION INTRAVENOUS
Status: DISCONTINUED | OUTPATIENT
Start: 2021-10-05 | End: 2021-10-05 | Stop reason: HOSPADM

## 2021-10-05 RX ORDER — NALOXONE HYDROCHLORIDE 0.4 MG/ML
0.4 INJECTION, SOLUTION INTRAMUSCULAR; INTRAVENOUS; SUBCUTANEOUS
Status: DISCONTINUED | OUTPATIENT
Start: 2021-10-05 | End: 2021-10-05 | Stop reason: HOSPADM

## 2021-10-05 RX ORDER — DEXTROMETHORPHAN/PSEUDOEPHED 2.5-7.5/.8
1.2 DROPS ORAL
Status: DISCONTINUED | OUTPATIENT
Start: 2021-10-05 | End: 2021-10-05 | Stop reason: HOSPADM

## 2021-10-05 RX ADMIN — PROPOFOL 50 MG: 10 INJECTION, EMULSION INTRAVENOUS at 09:47

## 2021-10-05 RX ADMIN — PROPOFOL 50 MG: 10 INJECTION, EMULSION INTRAVENOUS at 09:39

## 2021-10-05 RX ADMIN — PROPOFOL 50 MG: 10 INJECTION, EMULSION INTRAVENOUS at 09:42

## 2021-10-05 RX ADMIN — SODIUM CHLORIDE: 900 INJECTION, SOLUTION INTRAVENOUS at 09:17

## 2021-10-05 RX ADMIN — Medication 80 MCG: at 09:43

## 2021-10-05 RX ADMIN — LIDOCAINE HYDROCHLORIDE 40 MG: 20 INJECTION, SOLUTION EPIDURAL; INFILTRATION; INTRACAUDAL; PERINEURAL at 09:34

## 2021-10-05 RX ADMIN — PROPOFOL 25 MG: 10 INJECTION, EMULSION INTRAVENOUS at 09:55

## 2021-10-05 RX ADMIN — PROPOFOL 50 MG: 10 INJECTION, EMULSION INTRAVENOUS at 09:41

## 2021-10-05 RX ADMIN — Medication 80 MCG: at 09:51

## 2021-10-05 RX ADMIN — PROPOFOL 25 MG: 10 INJECTION, EMULSION INTRAVENOUS at 09:37

## 2021-10-05 RX ADMIN — Medication 80 MCG: at 09:40

## 2021-10-05 RX ADMIN — SODIUM CHLORIDE: 900 INJECTION, SOLUTION INTRAVENOUS at 09:28

## 2021-10-05 RX ADMIN — PROPOFOL 50 MG: 10 INJECTION, EMULSION INTRAVENOUS at 09:44

## 2021-10-05 RX ADMIN — PROPOFOL 50 MG: 10 INJECTION, EMULSION INTRAVENOUS at 09:34

## 2021-10-05 RX ADMIN — PROPOFOL 25 MG: 10 INJECTION, EMULSION INTRAVENOUS at 09:52

## 2021-10-05 RX ADMIN — PROPOFOL 25 MG: 10 INJECTION, EMULSION INTRAVENOUS at 09:49

## 2021-10-05 RX ADMIN — PROPOFOL 25 MG: 10 INJECTION, EMULSION INTRAVENOUS at 09:35

## 2021-10-05 RX ADMIN — PROPOFOL 25 MG: 10 INJECTION, EMULSION INTRAVENOUS at 09:36

## 2021-10-05 NOTE — PERIOP NOTES

## 2021-10-05 NOTE — PROCEDURES
[address], [address] [place] [address]                 Colonoscopy Procedure Note    Indications:   See Preoperative Diagnosis above  Referring Physician: Bettye Brown MD  Anesthesia/Sedation: MAC anesthesia Propofol  Endoscopist:  Dr. Gabriel Winn  Assistant:  Endoscopy Technician-1: (Unknown)  Endoscopy RN-1: (Unknown)  Preoperative diagnosis: Personal history of colonic polyps [Z86.010]  Postoperative diagnosis: * No post-op diagnosis entered *    Procedure in Detail:  Informed consent was obtained for the procedure, including sedation. Risks of perforation, hemorrhage, adverse drug reaction, and aspiration were discussed. The patient was placed in the left lateral decubitus position. Based on the pre-procedure assessment, including review of the patient's medical history, medications, allergies, and review of systems, she had been deemed to be an appropriate candidate for moderate sedation; she was therefore sedated with the medications listed above. The patient was monitored continuously with ECG tracing, pulse oximetry, blood pressure monitoring, and direct observations. A rectal examination was performed. The [id number] was inserted into the rectum and advanced under direct vision to the sigmoid colon. The quality of the colonic preparation was poor. A careful inspection was made as the colonoscope was withdrawn, including a retroflexed view of the rectum; findings and interventions are described below. Appropriate photodocumentation was not obtained. Findings:  Rectum: poor prep  Sigmoid: poor prep, full of solid stools, procedure terminated in sigmoid colon    Specimens:    none    EBL: None    Complications: None; patient tolerated the procedure well.     Recommendations:      - Repeat colon with 2 day prep with Mag Citrate and Trilyte      Signed By: Gabriel Winn MD                        October 5, 2021

## 2021-10-05 NOTE — DISCHARGE INSTRUCTIONS
Eunice Cui  [id number]  1950    COLON DISCHARGE INSTRUCTIONS  Discomfort:  Redness at IV site- apply warm compress to area; if redness or soreness persist- contact your physician  There may be a slight amount of blood passed from the rectum  Gaseous discomfort- walking, belching will help relieve any discomfort    DIET:   Regular diet. - however -  remember your colon is empty and a heavy meal will produce gas. Avoid these foods:  vegetables, fried / greasy foods, carbonated drinks for today. You may not drink alcoholic beverages for at least 12 hours    MEDICATIONS:   Regarding Aspirin or Nonsteroidal medications, please see below. ACTIVITY:  You may resume your normal daily activities it is recommended that you spend the remainder of the day resting -  avoid any strenuous activity. You may not operate a vehicle for 12 hours  You may not engage in an occupation involving machinery or appliances for rest of today  Avoid making any critical decisions for at least 24 hour    CALL M.D. ANY SIGN OF:  Increasing pain, nausea, vomiting  Abdominal distension (swelling)  New increased bleeding (oral or rectal)  Fever (chills)  Pain in chest area  Bloody discharge from nose or mouth  Shortness of breath    You may  take any Advil, Aspirin, Ibuprofen, Motrin, Aleve, or Tylenol as needed for pain. Post procedure diagnosis: colon polyps      Follow-up Instructions:   Call Dr. Bhanu Valdivia office in 10 days for biopsy results phone: [phone]      Quillian Confer from Nurse    The following personal items collected during your admission are returned to you:   Dental Appliance:    Vision:    Hearing Aid:    Jewelry:    Clothing:    Other Valuables:    Valuables sent to safe:      Learning About Coronavirus (COVID-19)  Coronavirus (COVID-19): Overview  What is coronavirus ([id number])? The coronavirus disease (COVID-19) is caused by a virus.  It is an illness that was first found in [place], [place], in December 2019. It has since spread worldwide. The virus can cause fever, cough, and trouble breathing. In severe cases, it can cause pneumonia and make it hard to breathe without help. It can cause death. Coronaviruses are a large group of viruses. They cause the common cold. They also cause more serious illnesses like Middle East respiratory syndrome (MERS) and severe acute respiratory syndrome (SARS). COVID-19 is caused by a novel coronavirus. That means it's a new type that has not been seen in people before. This virus spreads person-to-person through droplets from coughing and sneezing. It can also spread when you are close to someone who is infected. And it can spread when you touch something that has the virus on it, such as a doorknob or a tabletop. What can you do to protect yourself from coronavirus (COVID-19)? The best way to protect yourself from getting sick is to:  · Avoid areas where there is an outbreak. · Avoid contact with people who may be infected. · Wash your hands often with soap or alcohol-based hand sanitizers. · Avoid crowds and try to stay at least 6 feet away from other people. · Wash your hands often, especially after you cough or sneeze. Use soap and water, and scrub for at least 20 seconds. If soap and water aren't available, use an alcohol-based hand . · Avoid touching your mouth, nose, and eyes. What can you do to avoid spreading the virus to others? To help avoid spreading the virus to others:  · Cover your mouth with a tissue when you cough or sneeze. Then throw the tissue in the trash. · Use a disinfectant to clean things that you touch often. · Stay home if you are sick or have been exposed to the virus. Don't go to school, work, or public areas. And don't use public transportation. · If you are sick:  ? Leave your home only if you need to get medical care. But call the doctor's office first so they know you're coming.  And wear a face mask, if you have one.  ? If you have a face mask, wear it whenever you're around other people. It can help stop the spread of the virus when you cough or sneeze. ? Clean and disinfect your home every day. Use household  and disinfectant wipes or sprays. Take special care to clean things that you grab with your hands. These include doorknobs, remote controls, phones, and handles on your refrigerator and microwave. And don't forget countertops, tabletops, bathrooms, and computer keyboards. When to call for help  Call 911 anytime you think you may need emergency care. For example, call if:  · You have severe trouble breathing. (You can't talk at all.)  · You have constant chest pain or pressure. · You are severely dizzy or lightheaded. · You are confused or can't think clearly. · Your face and lips have a blue color. · You pass out (lose consciousness) or are very hard to wake up. Call your doctor now if you develop symptoms such as:  · Shortness of breath. · Fever. · Cough. If you need to get care, call ahead to the doctor's office for instructions before you go. Make sure you wear a face mask, if you have one, to prevent exposing other people to the virus. Where can you get the latest information? The following health organizations are tracking and studying this virus. Their websites contain the most up-to-date information. [place] also learn what to do if you think you may have been exposed to the virus. · U.S. Centers for Disease Control and Prevention (CDC): The CDC provides updated news about the disease and travel advice. The website also tells you how to prevent the spread of infection. www.cdc.gov  · World Health Organization [facility]): WHO offers information about the virus outbreaks. WHO also has travel advice. www.who.int  Current as of: April 1, 2020               Content Version: 12.4  © 1876-7556 Healthwise, Incorporated.    Care instructions adapted under license by your healthcare professional. If you have questions about a medical condition or this instruction, always ask your healthcare professional. [address] any warranty or liability for your use of this information. Patient Education   Patient Education        Diverticulosis: Care Instructions  Your Care Instructions  In diverticulosis, pouches called diverticula form in the wall of the large intestine (colon). The pouches do not cause any pain or other symptoms. Most people who have diverticulosis do not know they have it. But the pouches sometimes bleed, and if they become infected, they can cause pain and other symptoms. When this happens, it is called diverticulitis. Diverticula form when pressure pushes the wall of the colon outward at certain weak points. A diet that is too low in fiber can cause diverticula. Follow-up care is a key part of your treatment and safety. Be sure to make and go to all appointments, and call your doctor if you are having problems. It's also a good idea to know your test results and keep a list of the medicines you take. How can you care for yourself at home? · Include fruits, leafy green vegetables, beans, and whole grains in your diet each day. These foods are high in fiber. · Take a fiber supplement, such as Citrucel or Metamucil, every day if needed. Read and follow all instructions on the label. · Drink plenty of fluids. If you have kidney, heart, or liver disease and have to limit fluids, talk with your doctor before you increase the amount of fluids you drink. · Get at least 30 minutes of exercise on most days of the week. Walking is a good choice. You also may want to do other activities, such as running, swimming, cycling, or playing tennis or team sports. · Cut out foods that cause gas, pain, or other symptoms. When should you call for help?    Call your doctor now or seek immediate medical care if:    · You have belly pain.     · You pass maroon or very bloody stools.     · You have a fever.     · You have nausea and vomiting.     · You have unusual changes in your bowel movements or abdominal swelling.     · You have burning pain when you urinate.     · You have abnormal vaginal discharge.     · You have shoulder pain.     · You have cramping pain that does not get better when you have a bowel movement or pass gas.     · You pass gas or stool from your urethra while urinating. Watch closely for changes in your health, and be sure to contact your doctor if you have any problems. Where can you learn more? Go to http://www.gray.com/  Enter [id number] in the search box to learn more about \"Diverticulosis: Care Instructions. \"  Current as of: February 10, 2021               Content Version: 13.0  © 2006-2021 [employer]. Care instructions adapted under license by [employer] (which disclaims liability or warranty for this information). If you have questions about a medical condition or this instruction, always ask your healthcare professional. [place] [address] any warranty or liability for your use of this information. Colon Polyps: Care Instructions  Your Care Instructions     Colon polyps are growths in the colon or the rectum. The cause of most colon polyps is not known, and most people who get them do not have any problems. But a certain kind can turn into cancer. For this reason, regular testing for colon polyps is important for people as they get older. It is also important for anyone who has an increased risk for colon cancer. Polyps are usually found through routine colon cancer screening tests. Although most colon polyps are not cancerous, they are usually removed and then tested for cancer. Screening for colon cancer saves lives because the cancer can usually be cured if it is caught early. If you have a polyp that is the type that can turn into cancer, you may need more tests to examine your entire colon. The doctor will remove any other polyps that he or she finds, and you will be tested more often. Follow-up care is a key part of your treatment and safety. Be sure to make and go to all appointments, and call your doctor if you are having problems. It's also a good idea to know your test results and keep a list of the medicines you take. How can you care for yourself at home? Regular exams to look for colon polyps are the best way to prevent polyps from turning into colon cancer. These can include stool tests, sigmoidoscopy, colonoscopy, and CT colonography. Talk with your doctor about a testing schedule that is right for you. To prevent polyps  There is no home treatment that can prevent colon polyps. But these steps may help lower your risk for cancer. · Stay active. Being active can help you get to and stay at a healthy weight. Try to exercise on most days of the week. Walking is a good choice. · Eat well. Choose a variety of vegetables, fruits, legumes (such as peas and beans), fish, poultry, and whole grains. · Do not smoke. If you need help quitting, talk to your doctor about stop-smoking programs and medicines. These can increase your chances of quitting for good. · If you drink alcohol, limit how much you drink. Limit alcohol to 2 drinks a day for men and 1 drink a day for women. When should you call for help? Call your doctor now or seek immediate medical care if:    · You have severe belly pain.     · Your stools are maroon or very bloody. Watch closely for changes in your health, and be sure to contact your doctor if:    · You have a fever.     · You have nausea or vomiting.     · You have a change in bowel habits (new constipation or diarrhea).     · Your symptoms get worse or are not improving as expected. Where can you learn more? Go to http://www.[employer].com/  Enter C571 in the search box to learn more about \"Colon Polyps: Care Instructions. \"  Current as of: December 17, 2020               Content Version: 13.0  © 2006-2021 Healthwise, [employer]. Care instructions adapted under license by [employer] (which disclaims liability or warranty for this information). If you have questions about a medical condition or this instruction, always ask your healthcare professional. [address] any warranty or liability for your use of this information.

## 2021-10-05 NOTE — ANESTHESIA PREPROCEDURE EVALUATION
Relevant Problems   No relevant active problems       Anesthetic History   No history of anesthetic complications            Review of Systems / Medical History  Patient summary reviewed, nursing notes reviewed and pertinent labs reviewed    Pulmonary            Asthma : well controlled       Neuro/Psych         Psychiatric history     Cardiovascular    Hypertension: well controlled        Dysrhythmias   Hyperlipidemia    Exercise tolerance: >4 METS: History of A fib sp ablation in 2017, one event of SVT, occasional PVCs      GI/Hepatic/Renal       Hepatitis: type B  Renal disease      Comments: Renal artery stenosis Endo/Other        Obesity and arthritis     Other Findings            Physical Exam    Airway  Mallampati: I  TM Distance: > 6 cm  Neck ROM: normal range of motion   Mouth opening: Normal     Cardiovascular    Rhythm: regular  Rate: normal         Dental  No notable dental hx       Pulmonary  Breath sounds clear to auscultation               Abdominal  Abdominal exam normal       Other Findings            Anesthetic Plan    ASA: 3  Anesthesia type: MAC          Induction: Intravenous  Anesthetic plan and risks discussed with: Patient

## 2021-10-05 NOTE — PROCEDURES
[address]  [address]                 Colonoscopy Procedure Note    Indications:   See Preoperative Diagnosis above  Referring Physician: Rose Mary Waters MD  Anesthesia/Sedation: MAC anesthesia Propofol  Endoscopist:  Dr. Dianne Ramos  Assistant:  Endoscopy RN-1: Yumiko Olivier RN  Endoscopy RN-2: Laura Escalera RN  Preoperative diagnosis: Personal history of colonic polyps [Z86.010]  Postoperative diagnosis: 1)Colon Polyps    Procedure in Detail:  Informed consent was obtained for the procedure, including sedation. Risks of perforation, hemorrhage, adverse drug reaction, and aspiration were discussed. The patient was placed in the left lateral decubitus position. Based on the pre-procedure assessment, including review of the patient's medical history, medications, allergies, and review of systems, she had been deemed to be an appropriate candidate for moderate sedation; she was therefore sedated with the medications listed above. The patient was monitored continuously with ECG tracing, pulse oximetry, blood pressure monitoring, and direct observations. A rectal examination was performed. The [id number] was inserted into the rectum and advanced under direct vision to the cecum, which was identified by the ileocecal valve and appendiceal orifice. The quality of the colonic preparation was good. A careful inspection was made as the colonoscope was withdrawn, including a retroflexed view of the rectum; findings and interventions are described below. Appropriate photodocumentation was obtained. Findings:  Rectum: 3 mm polyp removed with cold biopsy  Sigmoid: moderate diverticulosis; Descending Colon: moderate diverticulosis;  Transverse Colon:  Three sessile polyps measuring 4, 8, 10 mm polyps were removed with hot snare  Ascending Colon: normal  Cecum: normal      Specimens:    Colon polyps    EBL: None    Complications: None; patient tolerated the procedure well. Recommendations:     - Await pathology. - If adenoma is present, repeat colonoscopy in 3 years. - High fiber diet.     Signed By: Jeremy Mejias MD                        October 5, 2021

## 2021-10-05 NOTE — H&P
Colonoscopy History and Physical      The patient was seen and examined. Date of last colonoscopy: 2016, Polyps  Yes      The airway was assessed and documented. The problem list, past medical history, and medications were reviewed. Patient Active Problem List   Diagnosis Code    Depression F32. A    Anxiety F41.9    Hypercholesterolemia E78.00    Renal artery stenosis (HCC) I70.1    History of hepatitis B Z86.19    Glucose intolerance (impaired glucose tolerance) R73.02    Essential hypertension, benign I10    Encounter for medication monitoring Z51.81    H/O atrial fibrillation without current medication Z86.79    Asthmatic bronchitis J45.909    Environmental and seasonal allergies J30.89    Severe obesity (BMI 35.0-39. 9) with comorbidity ([facility] [address] E66.01     Social History     Socioeconomic History    Marital status:      Spouse name: Not on file    Number of children: Not on file    Years of education: Not on file    Highest education level: Not on file   Occupational History    Not on file   Tobacco Use    Smoking status: Never Smoker    Smokeless tobacco: Never Used   Vaping Use    Vaping Use: Never used   Substance and Sexual Activity    Alcohol use: Yes     Alcohol/week: 0.0 standard drinks     Comment: socially    Drug use: No    Sexual activity: Yes     Partners: Male   Other Topics Concern    Not on file   Social History Narrative    Not on file     Social Determinants of Health     Financial Resource Strain:     Difficulty of Paying Living Expenses:    Food Insecurity:     Worried About Running Out of Food in the Last Year:     [address] in the Last Year:    Transportation Needs:     Lack of Transportation (Medical):      Lack of Transportation (Non-Medical):    Physical Activity:     Days of Exercise per Week:     Minutes of Exercise per Session:    Stress:     Feeling of Stress :    Social Connections:     Frequency of Communication with Friends and Family:  Frequency of Social Gatherings with Friends and Family:     Attends Taoist Services:     Active Member of Clubs or Organizations:     Attends Club or Organization Meetings:     Marital Status:    Intimate Partner Violence:     Fear of Current or Ex-Partner:     Emotionally Abused:     Physically Abused:     Sexually Abused:      Past Medical History:   Diagnosis Date    Anxiety     Arrhythmia     hx A-Fib    Arthritis     knees    Asthma     Depression     History of hepatitis B     Hypercholesterolemia     Hypertension     Renal artery stenosis ([facility] [address] 2006         Prior to Admission Medications   Prescriptions Last Dose Informant Patient Reported? Taking? Blood-Glucose Meter monitoring kit 10/5/2021 at Unknown time  No Yes   Sig: Please provide meter that is covered by insurance. E11.9   Cholecalciferol, Vitamin D3, (VITAMIN D) 2,000 unit Cap 10/4/2021 at Unknown time  Yes Yes   Sig: Take 1 Tab by mouth daily. MULTIVITAMIN PO 10/4/2021 at Unknown time  Yes Yes   Sig: Take 1 Tab by mouth daily. OTHER 10/4/2021 at Unknown time  Yes Yes   Sig: Flax seed oil daily   albuterol (PROVENTIL HFA, VENTOLIN HFA, PROAIR HFA) 90 mcg/actuation inhaler Unknown at Unknown time  No No   Sig: Take 1-2 Puffs by inhalation every four (4) hours as needed for Wheezing. albuterol (PROVENTIL VENTOLIN) 2.5 mg /3 mL (0.083 %) nebulizer solution Unknown at Unknown time  No No   Sig: 3 mL by Nebulization route every four (4) hours as needed for Wheezing. atorvastatin (LIPITOR) 20 mg tablet 10/4/2021 at Unknown time  No Yes   Sig: Take 1 Tab by mouth nightly. buPROPion XL (WELLBUTRIN XL) 300 mg XL tablet 10/4/2021 at Unknown time  No Yes   Sig: TAKE 1 TABLET EVERY MORNING   cinnamon bark (CINNAMON) 500 mg cap 10/4/2021 at Unknown time  Yes Yes   Sig: Take 500 mg by mouth two (2) times a day.    escitalopram oxalate (LEXAPRO) 20 mg tablet 10/4/2021 at Unknown time  No Yes   Sig: TAKE 1 TABLET DAILY   glucose blood VI test strips (blood glucose test) strip 10/5/2021 at Unknown time  No Yes   Sig: Please provide test strips that insurance will cover to test BS daily, E11.9   lancets misc 10/5/2021 at Unknown time  No Yes   Sig: Test Blood Sugar daily, E11.9   losartan (COZAAR) 50 mg tablet 10/4/2021 at Unknown time  No Yes   Sig: TAKE 1 TABLET DAILY   metFORMIN ER (glucophage XR) 500 mg tablet 10/4/2021 at Unknown time  No Yes   Sig: Take 1 Tab by mouth two (2) times a day. metoprolol succinate (TOPROL-XL) 100 mg tablet 10/4/2021 at Unknown time  No Yes   Sig: Take 1 Tab by mouth daily. montelukast (Singulair) 10 mg tablet Not Taking at Unknown time  No No   Sig: Take 1 Tab by mouth daily. Patient not taking: Reported on 10/5/2021   multivit-min/iron/folic/lutein (CENTRUM SILVER WOMEN PO)   Yes No   Sig: One tab daily   pantoprazole (PROTONIX) 40 mg tablet 10/4/2021 at Unknown time  No Yes   Sig: Take 1 Tab by mouth daily. triamcinolone acetonide (KENALOG) 0.1 % topical cream Unknown at Unknown time  Yes No   Sig: APPLY CREAM EXTERNALLY TWICE DAILY AS NEEDED TO RASH FOR FLARE UP      Facility-Administered Medications: None       The patient was seen and examined in the endoscopy suite. The airway was assessed and documented. The problem list and medications were reviewed. Chief complaint, history of present illness, and review of systems and Past medical History are positive for: colon polyps    The heart, lungs and mental status were satisfactory for the administration of sedation and for the procedure. I discussed with the patient the objectives, risks, consequences and alternatives to the procedure. The patient was counseled at length about the risks of chas Covid-19 in the emery-operative and post-operative states including the recovery window of their procedure.   The patient was made aware that chas Covid-19 after a surgical procedure may worsen their prognosis for recovering from the virus and lend to a higher morbidity and or mortality risk. The patient was given the options of postponing their procedure. All of the risks, benefits, and alternatives were discussed. The patient does  wish to proceed with the procedure.     Plan: Endoscopic procedure with sedation     John Lombardo MD   10/5/2021  9:34 AM

## 2021-10-09 NOTE — ANESTHESIA POSTPROCEDURE EVALUATION
Post-Anesthesia Evaluation and Assessment    Patient: Cinthya Spence MRN: [id number]  SSN: [id number]    YOB: 1950  Age: 80 y.o. Sex: female      I have evaluated the patient and they are stable and ready for discharge from the PACU. Cardiovascular Function/Vital Signs  Visit Vitals  BP (!) 105/51   Pulse (!) 58   Temp 36.8 °C (98.3 °F)   Resp 14   Ht 5' 8\" (1.727 m)   Wt 103.9 kg (229 lb)   SpO2 96%   Breastfeeding No   BMI 34.82 kg/m²       Patient is status post MAC anesthesia for Procedure(s):  COLONOSCOPY   :-  ENDOSCOPIC POLYPECTOMY. Nausea/Vomiting: None    Postoperative hydration reviewed and adequate. Pain:  Pain Scale 1: Numeric (0 - 10) (10/05/21 1022)  Pain Intensity 1: 0 (10/05/21 1022)   Managed    Neurological Status: At baseline    Mental Status, Level of Consciousness: Alert and  oriented to person, place, and time    Pulmonary Status:   O2 Device: None (Room air) (10/05/21 1022)   Adequate oxygenation and airway patent    Complications related to anesthesia: None    Post-anesthesia assessment completed.  No concerns    Signed By: Attila Amos DO     October 5, 2021

## 2021-10-19 ENCOUNTER — TELEPHONE (OUTPATIENT)
Dept: FAMILY MEDICINE CLINIC | Age: 71
End: 2021-10-19

## 2021-10-19 NOTE — TELEPHONE ENCOUNTER
----- Message from Poonam Hightower LPN sent at 66/05/6715  8:04 AM EDT -----  Regarding: FW: Non-Urgent Medical Question  Contact: [phone]  We are closed 12/31/2021 due to being New Year's Hilda. What other day?  ----- Message -----  From: [place]  Sent: 10/6/2021   7:03 PM EDT  To: [facility]  Subject: RE: Non-Urgent Medical Question                  12/31/2021 will work just fine. Thanks and have a great Raji!!!   Zach Paiz

## 2021-10-26 NOTE — PROGRESS NOTES
HISTORY OF PRESENT ILLNESS  Willie Alvarez is a 72 y.o. female. HPI   Follow up on chronic medical problems. Doing the precautionary measures at home to reduce risks of exposure COVID19. Also wearing mask when she is going out. No known sick contacts or known exposure to Stacy Mejía. 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 Cardiovascular Review:  The patient has hypertension, hyperlipidemia and obesity. H/o PAF and SVT. S/p ablation by cardiology. Diet and Lifestyle: generally follows a low fat low cholesterol diet, generally follows a low sodium diet, follows a diabetic diet regularly, exercises sporadically but has started back with more regular walking. Pertinent ROS: taking medications as instructed, no medication side effects noted, no TIA's, no chest pain on exertion, no dyspnea on exertion, no swelling of ankles. She has been intolerant to the statins for her cholesterol. Diabetes Mellitus:  She has glucose intolerance. Last a1c level was 5.9%  Diabetic ROS - diabetic diet compliance: compliant most of the time, further diabetic ROS: no polyuria or polydipsia, no chest pain, dyspnea or TIA's, no numbness, tingling or pain in extremities, no unusual visual symptoms. Lab review: orders written for new lab studies as appropriate; see orders. Depression Review:  Patient is seen for followup of depression. Treatment includes Wellbutrin, Lexapro. Ongoing symptoms include none, feeling great. She denies depressed mood, insomnia and fatigue. She experiences the following side effects from the treatment: none.     Patient Active Problem List   Diagnosis Code    Depression F32.9    Anxiety F41.9    Hypercholesterolemia E78.00    Renal artery stenosis (HCC) I70.1    History of hepatitis B Z86.19    Glucose intolerance (impaired glucose tolerance) R73.02    Essential hypertension, benign I5    Encounter for medication monitoring Z51.81    H/O atrial fibrillation without current medication Z86.79    Asthmatic bronchitis J45.909    Environmental and seasonal allergies J30.89    Severe obesity (BMI 35.0-39. 9) with comorbidity (HCC) E66.01       Current Outpatient Medications   Medication Sig Dispense Refill    buPROPion XL (WELLBUTRIN XL) 300 mg XL tablet TAKE 1 TABLET EVERY MORNING 90 Tab 3    escitalopram oxalate (LEXAPRO) 20 mg tablet TAKE 1 TABLET DAILY 90 Tab 3    losartan (COZAAR) 50 mg tablet TAKE 1 TABLET DAILY 90 Tab 3    pantoprazole (PROTONIX) 40 mg tablet Take 1 Tab by mouth daily. 90 Tab 3    budesonide-formoteroL (Symbicort) 160-4.5 mcg/actuation HFAA Take 2 Puffs by inhalation daily as needed (wheezing). 3 Inhaler 3    metoprolol succinate (TOPROL-XL) 100 mg tablet Take 1 Tab by mouth daily. 90 Tab 3    montelukast (SINGULAIR) 10 mg tablet Take 10 mg by mouth daily.  albuterol (PROVENTIL VENTOLIN) 2.5 mg /3 mL (0.083 %) nebulizer solution 3 mL by Nebulization route every four (4) hours as needed for Wheezing. 24 Each 11    albuterol (PROVENTIL HFA, VENTOLIN HFA, PROAIR HFA) 90 mcg/actuation inhaler Take 1-2 Puffs by inhalation every four (4) hours as needed for Wheezing. 1 Inhaler 11    cinnamon bark (CINNAMON) 500 mg cap Take 500 mg by mouth two (2) times a day.  Cholecalciferol, Vitamin D3, (VITAMIN D) 2,000 unit Cap Take 1 Tab by mouth daily.  MULTIVITAMIN PO Take 1 Tab by mouth daily.          Allergies   Allergen Reactions    Iodinated Contrast Media Swelling    Clindamycin Nausea and Vomiting    Codeine Other (comments)     Headaches      Morphine Other (comments)     headache         Past Medical History:   Diagnosis Date    Anxiety     Arrhythmia     hx A-Fib    Arthritis     knees    Asthma     Depression     History of hepatitis B     Hypercholesterolemia     Hypertension     Renal artery stenosis ([facility] [place] [address] 2006         Past Surgical History:   Procedure Laterality Date    CARDIAC SURG PROCEDURE UNLIST  4/28/2015    cardiac ablation     HX BLEPHAROPLASTY  04/26/2016    HX CATARACT REMOVAL  12/2019    right    HX CATARACT REMOVAL  01/2020    left     HX COLONOSCOPY  05/20/2016    \"    HX HYSTERECTOMY  1988    HX KNEE REPLACEMENT  05/22/2018    right knee    HX ORTHOPAEDIC  05/21/2020    right thumb    [facility] COLONOSCOPY FLX DX W/COLLJ SPEC WHEN PFRMD  12/01/2010    dr Krzysztof Dias         Family History   Problem Relation Age of Onset    Cancer Mother         multiple sites    Cancer Father         lung    Heart Disease Father     Heart Disease Brother     Heart Attack Brother     Lung Disease Brother         COPD    Other Sister         gastroparesis    Hypertension Brother     Elevated Lipids Brother        Social History     Tobacco Use    Smoking status: Never Smoker    Smokeless tobacco: Never Used   Substance Use Topics    Alcohol use:  Yes     Alcohol/week: 0.0 standard drinks     Comment: socially        Lab Results   Component Value Date/Time    WBC 10.0 12/11/2019 08:42 AM    HGB 13.7 12/11/2019 08:42 AM    HCT 40.7 12/11/2019 08:42 AM    PLATELET 235 61/49/5687 08:42 AM    MCV 95 12/11/2019 08:42 AM     Lab Results   Component Value Date/Time    Cholesterol, total 188 12/11/2019 08:42 AM    HDL Cholesterol 65 12/11/2019 08:42 AM    LDL, calculated 99 12/11/2019 08:42 AM    Triglyceride 119 12/11/2019 08:42 AM     Lab Results   Component Value Date/Time    TSH 3.880 10/26/2016 07:50 AM    T4, Free 1.14 01/28/2015 02:43 PM      Lab Results   Component Value Date/Time    Sodium 145 (H) 12/11/2019 08:42 AM    Potassium 3.7 12/11/2019 08:42 AM    Chloride 103 12/11/2019 08:42 AM    CO2 26 12/11/2019 08:42 AM    Glucose 85 12/11/2019 08:42 AM    BUN 18 12/11/2019 08:42 AM    Creatinine 1.00 12/11/2019 08:42 AM    BUN/Creatinine ratio 18 12/11/2019 08:42 AM    GFR est AA 67 12/11/2019 08:42 AM    GFR est non-AA 58 (L) 12/11/2019 08:42 AM    Calcium 9.2 12/11/2019 08:42 AM    Bilirubin, total 0.3 12/11/2019 08:42 AM    ALT (SGPT) 23 12/11/2019 08:42 AM    Alk. phosphatase 110 12/11/2019 08:42 AM    Protein, total 6.0 12/11/2019 08:42 AM    Albumin 3.9 12/11/2019 08:42 AM    A-G Ratio 1.9 12/11/2019 08:42 AM      Lab Results   Component Value Date/Time    Hemoglobin A1c 5.7 (H) 03/27/2013 01:20 PM    Hemoglobin A1c (POC) 5.9 12/11/2019 08:42 AM         Review of Systems   Constitutional: Negative for malaise/fatigue. HENT: Negative for congestion. Eyes: Negative for blurred vision. Respiratory: Negative for cough and shortness of breath. Cardiovascular: Negative for chest pain, palpitations and leg swelling. Gastrointestinal: Negative for abdominal pain, constipation and heartburn. Genitourinary: Negative for dysuria, frequency and urgency. Musculoskeletal: Negative for back pain and joint pain. Neurological: Negative for dizziness, tingling and headaches. Endo/Heme/Allergies: Negative for environmental allergies. Psychiatric/Behavioral: Negative for depression. The patient does not have insomnia. Physical Exam  Vitals signs and nursing note reviewed. Constitutional:       Appearance: Normal appearance. She is well-developed. She is obese. Comments: /73 (BP 1 Location: Left arm, BP Patient Position: Sitting)   Pulse 66   Temp 96.9 °F (36.1 °C) (Temporal)   Resp 18   Ht 5' 8\" (1.727 m)   Wt 253 lb 12.8 oz (115.1 kg)   SpO2 96%   BMI 38.59 kg/m²    HENT:      Right Ear: Tympanic membrane and ear canal normal.      Left Ear: Tympanic membrane and ear canal normal.      Nose: No mucosal edema or rhinorrhea. Neck:      Musculoskeletal: Normal range of motion and neck supple. Thyroid: No thyromegaly. Cardiovascular:      Rate and Rhythm: Normal rate and regular rhythm. Heart sounds: Normal heart sounds. No gallop. Pulmonary:      Effort: Pulmonary effort is normal.      Breath sounds: Normal breath sounds.    Abdominal:      General: Bowel sounds are normal.      Palpations: Abdomen is soft. There is no mass. Tenderness: There is no abdominal tenderness. Musculoskeletal: Normal range of motion. General: No swelling. Lymphadenopathy:      Cervical: No cervical adenopathy. Skin:     General: Skin is warm and dry. Neurological:      General: No focal deficit present. Mental Status: She is alert and oriented to person, place, and time. Psychiatric:         Mood and Affect: Mood normal.         Thought Content: Thought content normal.       ASSESSMENT and PLAN  Diagnoses and all orders for this visit:    1. Medicare annual wellness visit, subsequent    2. Essential hypertension, benign  Discussed sodium restriction, high k rich diet, maintaining ideal body weight and regular exercise program such as daily walking 30 min perday 4-5 times per week, as physiologic means to achieve blood pressure control. Medication compliance advised. 3. Hypercholesterolemia  -     LIPID PANEL; Future    4. Glucose intolerance (impaired glucose tolerance)  -     HEMOGLOBIN A1C WITH EAG; Future    5. Mild depression (HCC)  Stable     6. Paroxysmal SVT (supraventricular tachycardia) (HCC)  -     AMB POC EKG ROUTINE W/ 12 LEADS, INTER & REP  In NSR. 7. Gastroesophageal reflux disease without esophagitis  Stable on protonix    8. Encounter for medication monitoring  -     CBC W/O DIFF; Future  -     METABOLIC PANEL, COMPREHENSIVE; Future    9. Non morbid obesity  I have reviewed/discussed the above normal BMI with the patient. I have recommended the following interventions: dietary management education, guidance, and counseling . Follow-up and Dispositions    · Return in about 6 months (around 6/1/2021).        reviewed diet, exercise and weight control  cardiovascular risk and specific lipid/LDL goals reviewed  reviewed medications and side effects in detail  specific diabetic recommendations: low cholesterol diet, weight control and daily exercise discussed and glycohemoglobin and other lab monitoring discussed     I have discussed diagnosis listed in this note with pt and/or family. I have discussed treatment plans and options and the risk/benefit analysis of those options, including safe use of medications and possible medication side effects. Through the use of shared decision making we have agreed to the above plan. The patient has received an after-visit summary and questions were answered concerning future plans and follow up. Advise pt of any urgent changes then to proceed to the ER. Split-Thickness Skin Graft Text: The defect edges were debeveled with a #15 scalpel blade.  Given the location of the defect, shape of the defect and the proximity to free margins a split thickness skin graft was deemed most appropriate.  Using a sterile surgical marker, the primary defect shape was transferred to the donor site. The split thickness graft was then harvested.  The skin graft was then placed in the primary defect and oriented appropriately.

## 2021-12-27 ENCOUNTER — OFFICE VISIT (OUTPATIENT)
Dept: FAMILY MEDICINE CLINIC | Age: 71
End: 2021-12-27
Payer: MEDICARE

## 2021-12-27 VITALS
OXYGEN SATURATION: 98 % | TEMPERATURE: 97.3 F | HEART RATE: 61 BPM | BODY MASS INDEX: 35.07 KG/M2 | RESPIRATION RATE: 18 BRPM | HEIGHT: 68 IN | SYSTOLIC BLOOD PRESSURE: 128 MMHG | WEIGHT: 231.4 LBS | DIASTOLIC BLOOD PRESSURE: 75 MMHG

## 2021-12-27 DIAGNOSIS — I48.0 PAF (PAROXYSMAL ATRIAL FIBRILLATION) (HCC): ICD-10-CM

## 2021-12-27 DIAGNOSIS — E66.9 NON MORBID OBESITY: ICD-10-CM

## 2021-12-27 DIAGNOSIS — Z82.49 FAMILY HISTORY OF HEART DISEASE: ICD-10-CM

## 2021-12-27 DIAGNOSIS — R25.2 LEG CRAMPS: ICD-10-CM

## 2021-12-27 DIAGNOSIS — F32.A MILD DEPRESSION: ICD-10-CM

## 2021-12-27 DIAGNOSIS — I10 ESSENTIAL HYPERTENSION, BENIGN: ICD-10-CM

## 2021-12-27 DIAGNOSIS — Z51.81 ENCOUNTER FOR MEDICATION MONITORING: ICD-10-CM

## 2021-12-27 DIAGNOSIS — E78.00 HYPERCHOLESTEROLEMIA: ICD-10-CM

## 2021-12-27 DIAGNOSIS — E11.9 TYPE 2 DIABETES MELLITUS WITHOUT COMPLICATION, WITHOUT LONG-TERM CURRENT USE OF INSULIN (HCC): ICD-10-CM

## 2021-12-27 DIAGNOSIS — K21.9 GASTROESOPHAGEAL REFLUX DISEASE WITHOUT ESOPHAGITIS: ICD-10-CM

## 2021-12-27 DIAGNOSIS — R22.2 FULLNESS OF SUPRACLAVICULAR FOSSA: ICD-10-CM

## 2021-12-27 DIAGNOSIS — I47.1 PAROXYSMAL SVT (SUPRAVENTRICULAR TACHYCARDIA) (HCC): ICD-10-CM

## 2021-12-27 DIAGNOSIS — Z00.00 MEDICARE ANNUAL WELLNESS VISIT, SUBSEQUENT: Primary | ICD-10-CM

## 2021-12-27 DIAGNOSIS — E55.9 HYPOVITAMINOSIS D: ICD-10-CM

## 2021-12-27 LAB
25(OH)D3 SERPL-MCNC: 63 NG/ML (ref 30–100)
ALBUMIN SERPL-MCNC: 3.6 G/DL (ref 3.5–5)
ALBUMIN/GLOB SERPL: 1.2 {RATIO} (ref 1.1–2.2)
ALP SERPL-CCNC: 109 U/L (ref 45–117)
ALT SERPL-CCNC: 40 U/L (ref 12–78)
ANION GAP SERPL CALC-SCNC: 6 MMOL/L (ref 5–15)
APPEARANCE UR: CLEAR
AST SERPL-CCNC: 22 U/L (ref 15–37)
BACTERIA URNS QL MICRO: NEGATIVE /HPF
BILIRUB SERPL-MCNC: 0.3 MG/DL (ref 0.2–1)
BILIRUB UR QL: NEGATIVE
BUN SERPL-MCNC: 18 MG/DL (ref 6–20)
BUN/CREAT SERPL: 19 (ref 12–20)
CALCIUM SERPL-MCNC: 9.5 MG/DL (ref 8.5–10.1)
CHLORIDE SERPL-SCNC: 109 MMOL/L (ref 97–108)
CHOLEST SERPL-MCNC: 266 MG/DL
CO2 SERPL-SCNC: 29 MMOL/L (ref 21–32)
COLOR UR: ABNORMAL
CREAT SERPL-MCNC: 0.96 MG/DL (ref 0.55–1.02)
CREAT UR-MCNC: 226 MG/DL
EPITH CASTS URNS QL MICRO: ABNORMAL /LPF
ERYTHROCYTE [DISTWIDTH] IN BLOOD BY AUTOMATED COUNT: 13.2 % (ref 11.5–14.5)
GLOBULIN SER CALC-MCNC: 3 G/DL (ref 2–4)
GLUCOSE POC: 130 MG/DL
GLUCOSE SERPL-MCNC: 124 MG/DL (ref 65–100)
GLUCOSE UR STRIP.AUTO-MCNC: NEGATIVE MG/DL
HBA1C MFR BLD HPLC: 5.6 %
HCT VFR BLD AUTO: 44.1 % (ref 35–47)
HDLC SERPL-MCNC: 61 MG/DL
HDLC SERPL: 4.4 {RATIO} (ref 0–5)
HGB BLD-MCNC: 14.1 G/DL (ref 11.5–16)
HGB UR QL STRIP: NEGATIVE
KETONES UR QL STRIP.AUTO: NEGATIVE MG/DL
LDLC SERPL CALC-MCNC: 178.4 MG/DL (ref 0–100)
LEUKOCYTE ESTERASE UR QL STRIP.AUTO: NEGATIVE
MAGNESIUM SERPL-MCNC: 2.2 MG/DL (ref 1.6–2.4)
MCH RBC QN AUTO: 32.6 PG (ref 26–34)
MCHC RBC AUTO-ENTMCNC: 32 G/DL (ref 30–36.5)
MCV RBC AUTO: 102.1 FL (ref 80–99)
MICROALBUMIN UR-MCNC: 3.82 MG/DL
MICROALBUMIN/CREAT UR-RTO: 17 MG/G (ref 0–30)
MUCOUS THREADS URNS QL MICRO: ABNORMAL /LPF
NITRITE UR QL STRIP.AUTO: NEGATIVE
NRBC # BLD: 0 K/UL (ref 0–0.01)
NRBC BLD-RTO: 0 PER 100 WBC
PH UR STRIP: 5 [PH] (ref 5–8)
PLATELET # BLD AUTO: 225 K/UL (ref 150–400)
PMV BLD AUTO: 11.1 FL (ref 8.9–12.9)
POTASSIUM SERPL-SCNC: 4.2 MMOL/L (ref 3.5–5.1)
PROT SERPL-MCNC: 6.6 G/DL (ref 6.4–8.2)
PROT UR STRIP-MCNC: NEGATIVE MG/DL
RBC # BLD AUTO: 4.32 M/UL (ref 3.8–5.2)
RBC #/AREA URNS HPF: ABNORMAL /HPF (ref 0–5)
SODIUM SERPL-SCNC: 144 MMOL/L (ref 136–145)
SP GR UR REFRACTOMETRY: 1.02 (ref 1–1.03)
TRIGL SERPL-MCNC: 133 MG/DL (ref ?–150)
UA: UC IF INDICATED,UAUC: ABNORMAL
UROBILINOGEN UR QL STRIP.AUTO: 0.2 EU/DL (ref 0.2–1)
VLDLC SERPL CALC-MCNC: 26.6 MG/DL
WBC # BLD AUTO: 5.1 K/UL (ref 3.6–11)
WBC URNS QL MICRO: ABNORMAL /HPF (ref 0–4)

## 2021-12-27 PROCEDURE — 3017F COLORECTAL CA SCREEN DOC REV: CPT | Performed by: FAMILY MEDICINE

## 2021-12-27 PROCEDURE — 82947 ASSAY GLUCOSE BLOOD QUANT: CPT | Performed by: FAMILY MEDICINE

## 2021-12-27 PROCEDURE — G9717 DOC PT DX DEP/BP F/U NT REQ: HCPCS | Performed by: FAMILY MEDICINE

## 2021-12-27 PROCEDURE — 83036 HEMOGLOBIN GLYCOSYLATED A1C: CPT | Performed by: FAMILY MEDICINE

## 2021-12-27 PROCEDURE — G0439 PPPS, SUBSEQ VISIT: HCPCS | Performed by: FAMILY MEDICINE

## 2021-12-27 PROCEDURE — G8752 SYS BP LESS 140: HCPCS | Performed by: FAMILY MEDICINE

## 2021-12-27 PROCEDURE — G8536 NO DOC ELDER MAL SCRN: HCPCS | Performed by: FAMILY MEDICINE

## 2021-12-27 PROCEDURE — 1101F PT FALLS ASSESS-DOCD LE1/YR: CPT | Performed by: FAMILY MEDICINE

## 2021-12-27 PROCEDURE — G9899 SCRN MAM PERF RSLTS DOC: HCPCS | Performed by: FAMILY MEDICINE

## 2021-12-27 PROCEDURE — 3044F HG A1C LEVEL LT 7.0%: CPT | Performed by: FAMILY MEDICINE

## 2021-12-27 PROCEDURE — 1090F PRES/ABSN URINE INCON ASSESS: CPT | Performed by: FAMILY MEDICINE

## 2021-12-27 PROCEDURE — G8399 PT W/DXA RESULTS DOCUMENT: HCPCS | Performed by: FAMILY MEDICINE

## 2021-12-27 PROCEDURE — G8417 CALC BMI ABV UP PARAM F/U: HCPCS | Performed by: FAMILY MEDICINE

## 2021-12-27 PROCEDURE — G0463 HOSPITAL OUTPT CLINIC VISIT: HCPCS | Performed by: FAMILY MEDICINE

## 2021-12-27 PROCEDURE — G8754 DIAS BP LESS 90: HCPCS | Performed by: FAMILY MEDICINE

## 2021-12-27 PROCEDURE — 99214 OFFICE O/P EST MOD 30 MIN: CPT | Performed by: FAMILY MEDICINE

## 2021-12-27 PROCEDURE — 2022F DILAT RTA XM EVC RTNOPTHY: CPT | Performed by: FAMILY MEDICINE

## 2021-12-27 PROCEDURE — G8427 DOCREV CUR MEDS BY ELIG CLIN: HCPCS | Performed by: FAMILY MEDICINE

## 2021-12-27 RX ORDER — EZETIMIBE 10 MG/1
10 TABLET ORAL DAILY
Qty: 30 TABLET | Refills: 6 | Status: SHIPPED | OUTPATIENT
Start: 2021-12-27 | End: 2021-12-27 | Stop reason: CLARIF

## 2021-12-27 NOTE — PROGRESS NOTES
HISTORY OF PRESENT ILLNESS  Eunice Cui is a 80 y.o. female. HPI   Follow up on chronic medical problems. Feeling well. Doing the precautionary measures at home to reduce risks of exposure COVID19. Also wearing mask when she is going out. No known sick contacts or known exposure to [employer]. 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 Cardiovascular Review:  The patient has hypertension, hyperlipidemia and obesity. H/o PAF and SVT. S/p ablation by cardiology. Diet and Lifestyle: generally follows a low fat low cholesterol diet, generally follows a low sodium diet, follows a diabetic diet regularly, exercises sporadically but has started back with more regular walking. Pertinent ROS: taking medications as instructed, no medication side effects noted, no TIA's, no chest pain on exertion, no dyspnea on exertion, no swelling of ankles. She has been intolerant to the statins for her cholesterol. Diabetes Mellitus:  Compliant w/ meds, diabetic diet, and exercise. Denies any tingling sensation, polyuria and polydipsia. No blurred vision. Weight is down by  32 pounds in the past year. Diabetic ROS - diabetic diet compliance: compliant most of the time, further diabetic ROS: no polyuria or polydipsia, no chest pain, dyspnea or TIA's, no numbness, tingling or pain in extremities, no unusual visual symptoms. Lab review: orders written for new lab studies as appropriate; see orders. Depression Review:  Patient is seen for followup of depression. Treatment includes Wellbutrin, Lexapro. Ongoing symptoms include none, feeling great. She denies depressed mood, insomnia and fatigue. She experiences the following side effects from the treatment: none. HM:  Mammogram 3/15/2021  Bone density 11/15/2019  A1C 12/2/2020  Colonoscopy 10/5/2021 by Dr. Vinicius Damon repeat in 3 years. Patient Active Problem List   Diagnosis Code    Depression F32. A    Anxiety F41.9    Hypercholesterolemia E78.00    Renal artery stenosis (HCC) I70.1    History of hepatitis B Z86.19    Glucose intolerance (impaired glucose tolerance) R73.02    Essential hypertension, benign I5    Encounter for medication monitoring Z51.81    H/O atrial fibrillation without current medication Z86.79    Asthmatic bronchitis J45.909    Environmental and seasonal allergies J30.89    Severe obesity (BMI 35.0-39. 9) with comorbidity (HCC) E66.01       Current Outpatient Medications   Medication Sig Dispense Refill    multivit-min/iron/folic/lutein (CENTRUM SILVER WOMEN PO) One tab daily      OTHER Flax seed oil daily      metFORMIN ER (glucophage XR) 500 mg tablet Take 1 Tab by mouth two (2) times a day. 180 Tab 3    atorvastatin (LIPITOR) 20 mg tablet Take 1 Tab by mouth nightly. 90 Tab 3    buPROPion XL (WELLBUTRIN XL) 300 mg XL tablet TAKE 1 TABLET EVERY MORNING 90 Tab 3    escitalopram oxalate (LEXAPRO) 20 mg tablet TAKE 1 TABLET DAILY 90 Tab 3    losartan (COZAAR) 50 mg tablet TAKE 1 TABLET DAILY 90 Tab 3    pantoprazole (PROTONIX) 40 mg tablet Take 1 Tab by mouth daily. 90 Tab 3    metoprolol succinate (TOPROL-XL) 100 mg tablet Take 1 Tab by mouth daily. 90 Tab 3    montelukast (Singulair) 10 mg tablet Take 1 Tab by mouth daily. (Patient not taking: Reported on 10/5/2021) 90 Tab 3    triamcinolone acetonide (KENALOG) 0.1 % topical cream APPLY CREAM EXTERNALLY TWICE DAILY AS NEEDED TO RASH FOR FLARE UP      Blood-Glucose Meter monitoring kit Please provide meter that is covered by insurance. E11.9 1 Kit 0    glucose blood VI test strips (blood glucose test) strip Please provide test strips that insurance will cover to test BS daily, E11.9 100 Strip 3    lancets misc Test Blood Sugar daily, E11.9 100 Each 3    albuterol (PROVENTIL VENTOLIN) 2.5 mg /3 mL (0.083 %) nebulizer solution 3 mL by Nebulization route every four (4) hours as needed for Wheezing.  24 Each 11    albuterol (PROVENTIL HFA, VENTOLIN HFA, PROAIR HFA) 90 mcg/actuation inhaler Take 1-2 Puffs by inhalation every four (4) hours as needed for Wheezing. 1 Inhaler 11    cinnamon bark (CINNAMON) 500 mg cap Take 500 mg by mouth two (2) times a day.  Cholecalciferol, Vitamin D3, (VITAMIN D) 2,000 unit Cap Take 1 Tab by mouth daily.  MULTIVITAMIN PO Take 1 Tab by mouth daily. Allergies   Allergen Reactions    Iodinated Contrast Media Swelling    Clindamycin Nausea and Vomiting    Codeine Other (comments)     Headaches      Morphine Other (comments)     headache       Past Medical History:   Diagnosis Date    Anxiety     Arrhythmia     hx A-Fib    Arthritis     knees    Asthma     Depression     History of hepatitis B     Hypercholesterolemia     Hypertension     Renal artery stenosis ([facility] [address] 2006       Past Surgical History:   Procedure Laterality Date    COLONOSCOPY N/A 10/5/2021    COLONOSCOPY   :- performed by Percy Landau, [facility] at [facility]COPY    HX BLEPHAROPLASTY  04/26/2016    HX CATARACT REMOVAL  12/2019    right    HX CATARACT REMOVAL  01/2020    left     HX CHOLECYSTECTOMY Left 05/13/2021    [facility] orthoplasty / Dr. Chidi Cruz    HX COLONOSCOPY  05/20/2016    \"    HX HYSTERECTOMY  1988    HX KNEE REPLACEMENT  05/22/2018    right knee    HX ORTHOPAEDIC  05/21/2020    right thumb    [place] CARDI[place] SUR[place] PROCEDURE UNLIST  4/28/2015    cardiac ablation     [place] COL[place]OS[place] FLX DX W/COLLJ SPEC WHEN PFRMD  12/01/2010    dr Atul Garrett       Family History   Problem Relation Age of Onset    Cancer Mother         multiple sites    Cancer Father         lung    Heart Disease Father     Heart Disease Brother     Heart Attack Brother     Lung Disease Brother         COPD    Other Sister         gastroparesis    Hypertension Brother     Elevated Lipids Brother        Social History     Tobacco Use    Smoking status: Never Smoker    Smokeless tobacco: Never Used   Substance Use Topics    Alcohol use:  Yes Alcohol/week: 0.0 standard drinks     Comment: socially        Lab Results   Component Value Date/Time    WBC 5.6 12/02/2020 09:36 AM    HGB 14.8 12/02/2020 09:36 AM    HCT 44.9 12/02/2020 09:36 AM    PLATELET 741 36/71/7939 09:36 AM    MCV 97 12/02/2020 09:36 AM     Lab Results   Component Value Date/Time    Cholesterol, total 181 06/01/2021 12:00 AM    HDL Cholesterol 60 06/01/2021 12:00 AM    LDL, calculated 96 06/01/2021 12:00 AM    LDL, calculated 99 12/11/2019 08:42 AM    Triglyceride 141 06/01/2021 12:00 AM     Lab Results   Component Value Date/Time    TSH 3.880 10/26/2016 07:50 AM    T4, Free 1.14 01/28/2015 02:43 PM      Lab Results   Component Value Date/Time    Sodium 145 (H) 06/01/2021 12:00 AM    Potassium 5.0 06/01/2021 12:00 AM    Chloride 103 06/01/2021 12:00 AM    CO2 28 06/01/2021 12:00 AM    Glucose 122 (H) 06/01/2021 12:00 AM    BUN 15 06/01/2021 12:00 AM    Creatinine 0.95 06/01/2021 12:00 AM    BUN/Creatinine ratio 16 06/01/2021 12:00 AM    GFR est AA 71 06/01/2021 12:00 AM    GFR est non-AA 61 06/01/2021 12:00 AM    Calcium 9.7 06/01/2021 12:00 AM    Bilirubin, total 0.5 06/01/2021 12:00 AM    ALT (SGPT) 22 06/01/2021 12:00 AM    Alk. phosphatase 122 (H) 06/01/2021 12:00 AM    Protein, total 6.6 06/01/2021 12:00 AM    Albumin 4.4 06/01/2021 12:00 AM    A-G Ratio 2.0 06/01/2021 12:00 AM      Lab Results   Component Value Date/Time    Hemoglobin A1c 6.1 (H) 06/01/2021 12:00 AM    Hemoglobin A1c (POC) 5.6 02/24/2021 08:50 AM         Review of Systems   Constitutional: Negative for malaise/fatigue. HENT: Negative for congestion. Eyes: Negative for blurred vision. Respiratory: Negative for cough and shortness of breath. Cardiovascular: Negative for chest pain, palpitations and leg swelling. Gastrointestinal: Negative for abdominal pain, constipation and heartburn. Genitourinary: Negative for dysuria, frequency and urgency. Musculoskeletal: Negative for back pain.         Increase left knee pain d/t OA. Looking to get TKR in the new year. Has leg cramps at night a few time in a week. Skin:        Fullness in the left supraclavicular fossa. No pain. Has come up in the past several months she thinks. Neurological: Negative for dizziness, tingling and headaches. Endo/Heme/Allergies: Negative for environmental allergies. Psychiatric/Behavioral: Negative for depression. The patient does not have insomnia. Physical Exam  Vitals and nursing note reviewed. Constitutional:       Appearance: Normal appearance. She is well-developed. Comments: /75 (BP 1 Location: Left arm, BP Patient Position: Sitting)   Pulse 61   Temp 97.3 °F (36.3 °C) (Oral)   Resp 18   Ht 5' 8\" (1.727 m)   Wt 231 lb 6.4 oz (105 kg)   SpO2 98%   BMI 35.18 kg/m²    HENT:      Right Ear: Tympanic membrane and ear canal normal.      Left Ear: Tympanic membrane and ear canal normal.      Nose: No mucosal edema. Neck:      Thyroid: No thyromegaly. Vascular: No carotid bruit. Cardiovascular:      Rate and Rhythm: Normal rate and regular rhythm. Pulses: Normal pulses. Heart sounds: Normal heart sounds. No gallop. Pulmonary:      Effort: Pulmonary effort is normal.      Breath sounds: Normal breath sounds. Chest:   Breasts:      Right: Normal. No axillary adenopathy or supraclavicular adenopathy. Left: Normal. No axillary adenopathy or supraclavicular adenopathy. Abdominal:      General: Bowel sounds are normal.      Palpations: Abdomen is soft. There is no mass. Tenderness: There is no abdominal tenderness. Musculoskeletal:         General: Normal range of motion. Cervical back: Normal range of motion and neck supple. Right lower leg: No edema. Left lower leg: No edema. Comments: Foot exam done . Normal sensation to PP, LT and vibration. Sensation intact to microfilament testing. Pulses intact. No swelling.   No skin lesions or sores noted. No tinea present. Lymphadenopathy:      Cervical: No cervical adenopathy. Upper Body:      Right upper body: No supraclavicular, axillary or pectoral adenopathy. Left upper body: No supraclavicular, axillary or pectoral adenopathy. Skin:     General: Skin is warm and dry. Comments: Fullness in the left supraclavicular fossa. Neurological:      General: No focal deficit present. Mental Status: She is alert and oriented to person, place, and time. Psychiatric:         Mood and Affect: Mood normal.         ASSESSMENT and PLAN  Diagnoses and all orders for this visit:    1. Medicare annual wellness visit, subsequent    2. Essential hypertension, benign  Stable at goal.      3. Type 2 diabetes mellitus without complication, without long-term current use of insulin ([facility])  a1c stable at 5.6%. Continue to monitor. Work on diet and exercise. -     AMB POC HEMOGLOBIN A1C  -     AMB POC GLUCOSE, QUANTITATIVE, BLOOD  -     MICROALBUMIN, UR, RAND W/ MICROALB/CREAT RATIO; Future  -     URINALYSIS W/ REFLEX CULTURE; Future  -     CT HEART W/O CONT WITH CALCIUM; Future    4. Hypercholesterolemia  Intolerant to statins. Continue to monitor. Work on diet and exercise.  -     LIPID PANEL; Future  -     CT HEART W/O CONT WITH CALCIUM; Future    5. Paroxysmal SVT (supraventricular tachycardia) ([facility])  -     CT HEART W/O CONT WITH CALCIUM; Future    6. PAF (paroxysmal atrial fibrillation) ([facility])  -     CT HEART W/O CONT WITH CALCIUM; Future    7. Family history of heart disease  -     CT HEART W/O CONT WITH CALCIUM; Future    8. Gastroesophageal reflux disease without esophagitis  Stable on protonix    9. Mild depression ([facility])  Stable     10. Leg cramps  -     MAGNESIUM; Future    11. Hypovitaminosis D  -     VITAMIN D, 25 HYDROXY; Future    12. Fullness of supraclavicular fossa  -     XR CHEST PA LAT; Future  -     US CHEST; Future    13.  Encounter for medication monitoring  -     METABOLIC PANEL, COMPREHENSIVE; Future  -     CBC W/O DIFF; Future    14. Non morbid obesity  I have reviewed/discussed the above normal BMI with the patient. I have recommended the following interventions: dietary management education, guidance, and counseling . Follow-up and Dispositions    · Return in about 6 months (around 6/27/2022). reviewed diet, exercise and weight control  cardiovascular risk and specific lipid/LDL goals reviewed  reviewed medications and side effects in detail  specific diabetic recommendations: low cholesterol diet, weight control and daily exercise discussed, all medications, side effects and compliance discussed carefully, foot care discussed and Podiatry visits discussed, annual eye examinations at Ophthalmology discussed and glycohemoglobin and other lab monitoring discussed      I have discussed diagnosis listed in this note with pt and/or family. I have discussed treatment plans and options and the risk/benefit analysis of those options, including safe use of medications and possible medication side effects. Through the use of shared decision making we have agreed to the above plan. The patient has received an after-visit summary and questions were answered concerning future plans and follow up. Advise pt of any urgent changes then to proceed to the ER.

## 2021-12-27 NOTE — PROGRESS NOTES
This is a Subsequent Medicare Annual Wellness Exam (AWV) (Performed 12 months after IPPE or effective date of Medicare Part B enrollment)    I have reviewed the patient's medical history in detail and updated the computerized patient record. History     Patient Active Problem List   Diagnosis Code    Depression F32. A    Anxiety F41.9    Hypercholesterolemia E78.00    Renal artery stenosis (HCC) I70.1    History of hepatitis B Z86.19    Glucose intolerance (impaired glucose tolerance) R73.02    Essential hypertension, benign I10    Encounter for medication monitoring Z51.81    H/O atrial fibrillation without current medication Z86.79    Asthmatic bronchitis J45.909    Environmental and seasonal allergies J30.89    Severe obesity (BMI 35.0-39. 9) with comorbidity (HCC) E66.01       Current Outpatient Medications   Medication Sig Dispense Refill    multivit-min/iron/folic/lutein (CENTRUM SILVER WOMEN PO) One tab daily      OTHER Flax seed oil daily      metFORMIN ER (glucophage XR) 500 mg tablet Take 1 Tab by mouth two (2) times a day. 180 Tab 3    atorvastatin (LIPITOR) 20 mg tablet Take 1 Tab by mouth nightly. 90 Tab 3    buPROPion XL (WELLBUTRIN XL) 300 mg XL tablet TAKE 1 TABLET EVERY MORNING 90 Tab 3    escitalopram oxalate (LEXAPRO) 20 mg tablet TAKE 1 TABLET DAILY 90 Tab 3    losartan (COZAAR) 50 mg tablet TAKE 1 TABLET DAILY 90 Tab 3    pantoprazole (PROTONIX) 40 mg tablet Take 1 Tab by mouth daily. 90 Tab 3    metoprolol succinate (TOPROL-XL) 100 mg tablet Take 1 Tab by mouth daily. 90 Tab 3    montelukast (Singulair) 10 mg tablet Take 1 Tab by mouth daily. (Patient not taking: Reported on 10/5/2021) 90 Tab 3    triamcinolone acetonide (KENALOG) 0.1 % topical cream APPLY CREAM EXTERNALLY TWICE DAILY AS NEEDED TO RASH FOR FLARE UP      Blood-Glucose Meter monitoring kit Please provide meter that is covered by insurance.  E11.9 1 Kit 0    glucose blood VI test strips (blood glucose test) strip Please provide test strips that insurance will cover to test BS daily, E11.9 100 Strip 3    lancets misc Test Blood Sugar daily, E11.9 100 Each 3    albuterol (PROVENTIL VENTOLIN) 2.5 mg /3 mL (0.083 %) nebulizer solution 3 mL by Nebulization route every four (4) hours as needed for Wheezing. 24 Each 11    albuterol (PROVENTIL HFA, VENTOLIN HFA, PROAIR HFA) 90 mcg/actuation inhaler Take 1-2 Puffs by inhalation every four (4) hours as needed for Wheezing. 1 Inhaler 11    cinnamon bark (CINNAMON) 500 mg cap Take 500 mg by mouth two (2) times a day.  Cholecalciferol, Vitamin D3, (VITAMIN D) 2,000 unit Cap Take 1 Tab by mouth daily.  MULTIVITAMIN PO Take 1 Tab by mouth daily.          Allergies   Allergen Reactions    Iodinated Contrast Media Swelling    Clindamycin Nausea and Vomiting    Codeine Other (comments)     Headaches      Morphine Other (comments)     headache       Past Medical History:   Diagnosis Date    Anxiety     Arrhythmia     hx A-Fib    Arthritis     knees    Asthma     Depression     History of hepatitis B     Hypercholesterolemia     Hypertension     Renal artery stenosis ([address] 2006       Past Surgical History:   Procedure Laterality Date    COLONOSCOPY N/A 10/5/2021    COLONOSCOPY   :- [facility] Al[facility]COPY    HX BLEPHAROPLASTY  04/26/2016    HX CATARACT REMOVAL  12/2019    right    HX CATARACT REMOVAL  01/2020    left     HX CHOLECYSTECTOMY Left 05/13/2021    basel joint orthoplasty / Dr. Armstrong Manual    HX COLONOSCOPY  05/20/2016    \"    HX HYSTERECTOMY  1988    HX KNEE REPLACEMENT  05/22/2018    right knee    HX ORTHOPAEDIC  05/21/2020    right thumb    [place] CARDI[place] SUR[place] PROCEDURE UNLIST  4/28/2015    cardiac ablation     [place] COLONOSCOPY FLX DX W/COLLJ SPEC WHEN PFRMD  12/01/2010    dr Grisel Robles       Family History   Problem Relation Age of Onset    Cancer Mother         multiple sites   Rhina Norma Cancer Father         lung    Heart Disease Father     Heart Disease Brother     Heart Attack Brother     Lung Disease Brother         COPD    Other Sister         gastroparesis    Hypertension Brother     Elevated Lipids Brother        Social History     Tobacco Use    Smoking status: Never Smoker    Smokeless tobacco: Never Used   Substance Use Topics    Alcohol use: Yes     Alcohol/week: 0.0 standard drinks     Comment: socially         Depression Risk Factor Screening:     PHQ over the last two weeks    Little interest or pleasure in doing things Not at all   Feeling down, depressed or hopeless Not at all   Total Score PHQ 2 0     Alcohol Risk Factor Screening: You do not drink alcohol or very rarely. Functional Ability and Level of Safety:   Hearing Loss  Hearing is good. Activities of Daily Living  The home contains: discussed safety equipment. Patient does total self care    Fall RiskFall Risk Assessment, last 12 mths    Able to walk? Yes   Fall in past 12 months? No     Functional Ability:   Does the patient exhibit a steady gait? yes    How long did it take the patient to get up and walk from a sitting position? seconds    Is the patient self reliant? (ie can do own laundry, meals, household chores)  yes   Does the patient handle his/her own medications? yes   Does the patient handle his/her own money? yes   Is the patients home safe (ie good lighting, handrails on stairs and bath, etc.)? yes   Did you notice or did patient express any hearing difficulties? no   Did you notice or did patient express any vision difficulties? no        Advance Care Planning:   Patient was offered the opportunity to discuss advance care planning:  yes    Does patient have an Advance Directive:  no   If no, did you provide information on [facility] Connections?   yes      Abuse Screen  Patient is not abused    Cognitive Screening   Evaluation of Cognitive Function:  Has your family/caregiver stated any concerns about your memory: no      Patient Care Team   Patient Care Team:  Juan Yepez MD as PCP - General    Assessment/Plan   Education and counseling provided:  Are appropriate based on today's review and evaluation  End-of-Life planning (with patient's consent)    ASSESSMENT and PLAN    Medicare Annual Wellness  Continue current treatment plan. Continue annual follow up. I have discussed diagnosis listed in this note with pt and/or family. I have discussed treatment plans and options and the risk/benefit analysis of those options, including safe use of medications and possible medication side effects. Through the use of shared decision making we have agreed to the above plan. The patient has received an after-visit summary and questions were answered concerning future plans and follow up. Advise pt of any urgent changes then to proceed to the ER.

## 2021-12-27 NOTE — PROGRESS NOTES
Chief Complaint   Patient presents with    Annual Wellness Visit           Mammogram 3/15/2021    Bone density 11/15/2019    A1C 12/2/2020    Colonoscopy 10/5/2021 by Dr. Ankur Ngo repeat in 3 years. 1. Have you been to the ER, urgent care clinic since your last visit? Hospitalized since your last visit? No    2. Have you seen or consulted any other health care providers outside of the [address] [place] [address] since your last visit? Include any pap smears or colon screening.  No

## 2022-01-18 ENCOUNTER — HOSPITAL ENCOUNTER (OUTPATIENT)
Dept: ULTRASOUND IMAGING | Age: 72
Discharge: HOME | End: 2022-01-18
Attending: FAMILY MEDICINE
Payer: MEDICARE

## 2022-01-18 ENCOUNTER — TELEPHONE (OUTPATIENT)
Dept: FAMILY MEDICINE CLINIC | Age: 72
End: 2022-01-18

## 2022-01-18 ENCOUNTER — HOSPITAL ENCOUNTER (OUTPATIENT)
Dept: CT IMAGING | Age: 72
Discharge: HOME | End: 2022-01-18
Attending: FAMILY MEDICINE
Payer: SELF-PAY

## 2022-01-18 DIAGNOSIS — I10 ESSENTIAL HYPERTENSION, BENIGN: ICD-10-CM

## 2022-01-18 DIAGNOSIS — E78.00 HYPERCHOLESTEROLEMIA: ICD-10-CM

## 2022-01-18 DIAGNOSIS — Z82.49 FAMILY HISTORY OF HEART DISEASE: ICD-10-CM

## 2022-01-18 DIAGNOSIS — R22.2 FULLNESS OF SUPRACLAVICULAR FOSSA: ICD-10-CM

## 2022-01-18 DIAGNOSIS — I48.0 PAF (PAROXYSMAL ATRIAL FIBRILLATION) (HCC): ICD-10-CM

## 2022-01-18 DIAGNOSIS — E11.9 TYPE 2 DIABETES MELLITUS WITHOUT COMPLICATION, WITHOUT LONG-TERM CURRENT USE OF INSULIN (HCC): ICD-10-CM

## 2022-01-18 DIAGNOSIS — I47.1 PAROXYSMAL SVT (SUPRAVENTRICULAR TACHYCARDIA) (HCC): ICD-10-CM

## 2022-01-18 PROCEDURE — 75571 CT HRT W/O DYE W/CA TEST: CPT

## 2022-01-18 PROCEDURE — 76604 US EXAM CHEST: CPT

## 2022-02-15 DIAGNOSIS — I47.1 PAROXYSMAL SVT (SUPRAVENTRICULAR TACHYCARDIA) (HCC): ICD-10-CM

## 2022-02-15 DIAGNOSIS — K21.9 GASTROESOPHAGEAL REFLUX DISEASE WITHOUT ESOPHAGITIS: ICD-10-CM

## 2022-02-15 DIAGNOSIS — F32.A DEPRESSION, UNSPECIFIED DEPRESSION TYPE: ICD-10-CM

## 2022-02-15 DIAGNOSIS — E11.9 TYPE 2 DIABETES MELLITUS WITHOUT COMPLICATION, WITHOUT LONG-TERM CURRENT USE OF INSULIN (HCC): ICD-10-CM

## 2022-02-15 RX ORDER — BUPROPION HYDROCHLORIDE 300 MG/1
TABLET ORAL
Qty: 90 TABLET | Refills: 3 | Status: SHIPPED | OUTPATIENT
Start: 2022-02-15

## 2022-02-15 RX ORDER — LOSARTAN POTASSIUM 50 MG/1
TABLET ORAL
Qty: 90 TABLET | Refills: 3 | Status: SHIPPED | OUTPATIENT
Start: 2022-02-15

## 2022-02-15 RX ORDER — ESCITALOPRAM OXALATE 20 MG/1
TABLET ORAL
Qty: 90 TABLET | Refills: 3 | Status: SHIPPED | OUTPATIENT
Start: 2022-02-15

## 2022-02-15 RX ORDER — METOPROLOL SUCCINATE 100 MG/1
100 TABLET, EXTENDED RELEASE ORAL DAILY
Qty: 90 TABLET | Refills: 3 | Status: SHIPPED | OUTPATIENT
Start: 2022-02-15

## 2022-02-15 RX ORDER — PANTOPRAZOLE SODIUM 40 MG/1
40 TABLET, DELAYED RELEASE ORAL DAILY
Qty: 90 TABLET | Refills: 3 | Status: SHIPPED | OUTPATIENT
Start: 2022-02-15

## 2022-02-15 RX ORDER — METFORMIN HYDROCHLORIDE 500 MG/1
500 TABLET, EXTENDED RELEASE ORAL 2 TIMES DAILY
Qty: 180 TABLET | Refills: 3 | Status: SHIPPED | OUTPATIENT
Start: 2022-02-15 | End: 2022-10-05 | Stop reason: DRUGHIGH

## 2022-03-18 PROBLEM — J30.89 ENVIRONMENTAL AND SEASONAL ALLERGIES: Status: ACTIVE | Noted: 2017-06-06

## 2022-03-19 PROBLEM — J45.909 ASTHMATIC BRONCHITIS: Status: ACTIVE | Noted: 2017-06-06

## 2022-03-19 PROBLEM — E66.01 SEVERE OBESITY (BMI 35.0-39.9) WITH COMORBIDITY (HCC): Status: ACTIVE | Noted: 2018-05-15

## 2022-06-20 ENCOUNTER — OFFICE VISIT (OUTPATIENT)
Dept: FAMILY MEDICINE CLINIC | Age: 72
End: 2022-06-20
Payer: MEDICARE

## 2022-06-20 VITALS
TEMPERATURE: 98.4 F | SYSTOLIC BLOOD PRESSURE: 134 MMHG | DIASTOLIC BLOOD PRESSURE: 59 MMHG | HEIGHT: 68 IN | RESPIRATION RATE: 17 BRPM | WEIGHT: 238.4 LBS | BODY MASS INDEX: 36.13 KG/M2 | OXYGEN SATURATION: 97 % | HEART RATE: 76 BPM

## 2022-06-20 DIAGNOSIS — I47.1 PAROXYSMAL SVT (SUPRAVENTRICULAR TACHYCARDIA) (HCC): ICD-10-CM

## 2022-06-20 DIAGNOSIS — I48.0 PAF (PAROXYSMAL ATRIAL FIBRILLATION) (HCC): ICD-10-CM

## 2022-06-20 DIAGNOSIS — I10 ESSENTIAL HYPERTENSION, BENIGN: Primary | ICD-10-CM

## 2022-06-20 DIAGNOSIS — Z51.81 ENCOUNTER FOR MEDICATION MONITORING: ICD-10-CM

## 2022-06-20 DIAGNOSIS — E11.9 TYPE 2 DIABETES MELLITUS WITHOUT COMPLICATION, WITHOUT LONG-TERM CURRENT USE OF INSULIN (HCC): ICD-10-CM

## 2022-06-20 DIAGNOSIS — Z23 ENCOUNTER FOR IMMUNIZATION: ICD-10-CM

## 2022-06-20 DIAGNOSIS — K21.9 GASTROESOPHAGEAL REFLUX DISEASE WITHOUT ESOPHAGITIS: ICD-10-CM

## 2022-06-20 DIAGNOSIS — F32.A MILD DEPRESSION: ICD-10-CM

## 2022-06-20 DIAGNOSIS — E78.00 HYPERCHOLESTEROLEMIA: ICD-10-CM

## 2022-06-20 LAB
GLUCOSE POC: 114 MG/DL
HBA1C MFR BLD HPLC: 5.8 %

## 2022-06-20 PROCEDURE — 83036 HEMOGLOBIN GLYCOSYLATED A1C: CPT | Performed by: FAMILY MEDICINE

## 2022-06-20 PROCEDURE — G8536 NO DOC ELDER MAL SCRN: HCPCS | Performed by: FAMILY MEDICINE

## 2022-06-20 PROCEDURE — 1101F PT FALLS ASSESS-DOCD LE1/YR: CPT | Performed by: FAMILY MEDICINE

## 2022-06-20 PROCEDURE — 3017F COLORECTAL CA SCREEN DOC REV: CPT | Performed by: FAMILY MEDICINE

## 2022-06-20 PROCEDURE — 99214 OFFICE O/P EST MOD 30 MIN: CPT | Performed by: FAMILY MEDICINE

## 2022-06-20 PROCEDURE — G8754 DIAS BP LESS 90: HCPCS | Performed by: FAMILY MEDICINE

## 2022-06-20 PROCEDURE — 1123F ACP DISCUSS/DSCN MKR DOCD: CPT | Performed by: FAMILY MEDICINE

## 2022-06-20 PROCEDURE — G9899 SCRN MAM PERF RSLTS DOC: HCPCS | Performed by: FAMILY MEDICINE

## 2022-06-20 PROCEDURE — 3044F HG A1C LEVEL LT 7.0%: CPT | Performed by: FAMILY MEDICINE

## 2022-06-20 PROCEDURE — G8427 DOCREV CUR MEDS BY ELIG CLIN: HCPCS | Performed by: FAMILY MEDICINE

## 2022-06-20 PROCEDURE — G8417 CALC BMI ABV UP PARAM F/U: HCPCS | Performed by: FAMILY MEDICINE

## 2022-06-20 PROCEDURE — 82947 ASSAY GLUCOSE BLOOD QUANT: CPT | Performed by: FAMILY MEDICINE

## 2022-06-20 PROCEDURE — 1090F PRES/ABSN URINE INCON ASSESS: CPT | Performed by: FAMILY MEDICINE

## 2022-06-20 PROCEDURE — 2022F DILAT RTA XM EVC RTNOPTHY: CPT | Performed by: FAMILY MEDICINE

## 2022-06-20 PROCEDURE — G9717 DOC PT DX DEP/BP F/U NT REQ: HCPCS | Performed by: FAMILY MEDICINE

## 2022-06-20 PROCEDURE — G8752 SYS BP LESS 140: HCPCS | Performed by: FAMILY MEDICINE

## 2022-06-20 PROCEDURE — G8399 PT W/DXA RESULTS DOCUMENT: HCPCS | Performed by: FAMILY MEDICINE

## 2022-06-20 NOTE — PROGRESS NOTES
HISTORY OF PRESENT ILLNESS  Jackelyn Krishnan is a 80 y.o. female. HPI   Follow up on chronic medical problems. Feeling well. Doing the precautionary measures at home to reduce risks of exposure COVID19. Also wearing mask when she is going out. No known sick contacts or known exposure to [address]. Cardiovascular Review:  The patient has hypertension, hyperlipidemia and obesity. H/o PAF and SVT. S/p ablation by cardiology. Diet and Lifestyle: generally follows a low fat low cholesterol diet, generally follows a low sodium diet, follows a diabetic diet regularly, exercises sporadically but has started back with more regular walking. Pertinent ROS: taking medications as instructed, no medication side effects noted, no TIA's, no chest pain on exertion, no dyspnea on exertion, no swelling of ankles. She has been intolerant to the statins for her cholesterol. Diabetes Mellitus:  Compliant w/ meds, diabetic diet, and exercise. Metformin is causing some loose stools. Denies any tingling sensation, polyuria and polydipsia. No blurred vision. Weight is stable from her last visit. Diabetic ROS - diabetic diet compliance: compliant most of the time, further diabetic ROS: no polyuria or polydipsia, no chest pain, dyspnea or TIA's, no numbness, tingling or pain in extremities, no unusual visual symptoms. Lab review: orders written for new lab studies as appropriate; see orders. Depression Review:  Patient is seen for followup of depression. Treatment includes Wellbutrin, Lexapro. Ongoing symptoms include none, feeling great. She denies depressed mood, insomnia and fatigue. She experiences the following side effects from the treatment: none. HM:  Mammogram 3/15/2021  Bone density 11/15/2019  A1C 12/2/2020  Colonoscopy 10/5/2021 by Dr. Geovanny Zaldivar repeat in 3 years. Patient Active Problem List   Diagnosis Code    Depression F32. A    Anxiety F41.9    Hypercholesterolemia E78.00    History of hepatitis B Z86.19    Glucose intolerance (impaired glucose tolerance) R73.02    Essential hypertension, benign I5    Encounter for medication monitoring Z51.81    H/O atrial fibrillation without current medication Z86.79    Asthmatic bronchitis J45.909    Environmental and seasonal allergies J30.89    Severe obesity (BMI 35.0-39. 9) with comorbidity (HCC) E66.01       Current Outpatient Medications   Medication Sig Dispense Refill    metFORMIN ER (glucophage XR) 500 mg tablet Take 1 Tablet by mouth two (2) times a day. 180 Tablet 3    buPROPion XL (WELLBUTRIN XL) 300 mg XL tablet TAKE 1 TABLET EVERY MORNING 90 Tablet 3    escitalopram oxalate (LEXAPRO) 20 mg tablet TAKE 1 TABLET DAILY 90 Tablet 3    losartan (COZAAR) 50 mg tablet TAKE 1 TABLET DAILY 90 Tablet 3    pantoprazole (PROTONIX) 40 mg tablet Take 1 Tablet by mouth daily. 90 Tablet 3    metoprolol succinate (TOPROL-XL) 100 mg tablet Take 1 Tablet by mouth daily. 90 Tablet 3    OTHER Flax seed oil daily      triamcinolone acetonide (KENALOG) 0.1 % topical cream APPLY CREAM EXTERNALLY TWICE DAILY AS NEEDED TO RASH FOR FLARE UP      Blood-Glucose Meter monitoring kit Please provide meter that is covered by insurance. E11.9 1 Kit 0    glucose blood VI test strips (blood glucose test) strip Please provide test strips that insurance will cover to test BS daily, E11.9 100 Strip 3    lancets misc Test Blood Sugar daily, E11.9 100 Each 3    albuterol (PROVENTIL VENTOLIN) 2.5 mg /3 mL (0.083 %) nebulizer solution 3 mL by Nebulization route every four (4) hours as needed for Wheezing. 24 Each 11    albuterol (PROVENTIL HFA, VENTOLIN HFA, PROAIR HFA) 90 mcg/actuation inhaler Take 1-2 Puffs by inhalation every four (4) hours as needed for Wheezing. 1 Inhaler 11    cinnamon bark (CINNAMON) 500 mg cap Take 500 mg by mouth two (2) times a day.  Cholecalciferol, Vitamin D3, (VITAMIN D) 2,000 unit Cap Take 1 Tab by mouth daily.       MULTIVITAMIN PO Take 1 Tab by mouth daily. Allergies   Allergen Reactions    Iodinated Contrast Media Swelling    Clindamycin Nausea and Vomiting    Codeine Other (comments)     Headaches      Morphine Other (comments)     headache         Past Medical History:   Diagnosis Date    Anxiety     Arrhythmia     hx A-Fib    Arthritis     knees    Asthma     Depression     History of hepatitis B     Hypercholesterolemia     Hypertension     Renal artery stenosis ([address] 2006         Past Surgical History:   Procedure Laterality Date    COLONOSCOPY N/A 10/5/2021    COLONOSCOPY   :- performed by Quique Bermeo [facility]    HX BLEPHAROPLASTY  04/26/2016    HX CATARACT REMOVAL  12/2019    right    HX CATARACT REMOVAL  01/2020    left     HX CHOLECYSTECTOMY Left 05/13/2021    [facility] joint orthoplasty / Dr. Andrea Corley    HX COLONOSCOPY  05/20/2016    \"    HX HYSTERECTOMY  1988    HX KNEE REPLACEMENT  05/22/2018    right knee    HX ORTHOPAEDIC  05/21/2020    right thumb    AK CARDIAC SURG PROCEDURE UNLIST  4/28/2015    cardiac ablation     AK COLONOSCOPY FLX DX W/COLLJ SPEC WHEN PFRMD  12/01/2010    dr Iván Armendariz         Family History   Problem Relation Age of Onset    Cancer Mother         multiple sites    Cancer Father         lung    Heart Disease Father     Heart Disease Brother     Heart Attack Brother     Lung Disease Brother         COPD    Other Sister         gastroparesis    Hypertension Brother     Elevated Lipids Brother        Social History     Tobacco Use    Smoking status: Never Smoker    Smokeless tobacco: Never Used   Substance Use Topics    Alcohol use:  Yes     Alcohol/week: 0.0 standard drinks     Comment: socially        Lab Results   Component Value Date/Time    WBC 5.1 12/27/2021 09:08 AM    HGB 14.1 12/27/2021 09:08 AM    HCT 44.1 12/27/2021 09:08 AM    PLATELET 546 39/10/3503 09:08 AM    .1 (H) 12/27/2021 09:08 AM     Lab Results   Component Value Date/Time    Cholesterol, total 266 (H) 12/27/2021 09:08 AM    HDL Cholesterol 61 12/27/2021 09:08 AM    LDL, calculated 178.4 (H) 12/27/2021 09:08 AM    Triglyceride 133 12/27/2021 09:08 AM    CHOL/HDL Ratio 4.4 12/27/2021 09:08 AM     Lab Results   Component Value Date/Time    TSH 3.880 10/26/2016 07:50 AM    T4, Free 1.14 01/28/2015 02:43 PM      Lab Results   Component Value Date/Time    Sodium 144 12/27/2021 09:08 AM    Potassium 4.2 12/27/2021 09:08 AM    Chloride 109 (H) 12/27/2021 09:08 AM    CO2 29 12/27/2021 09:08 AM    Anion gap 6 12/27/2021 09:08 AM    Glucose 124 (H) 12/27/2021 09:08 AM    BUN 18 12/27/2021 09:08 AM    Creatinine 0.96 12/27/2021 09:08 AM    BUN/Creatinine ratio 19 12/27/2021 09:08 AM    GFR est AA >60 12/27/2021 09:08 AM    GFR est non-AA 57 (L) 12/27/2021 09:08 AM    Calcium 9.5 12/27/2021 09:08 AM    Bilirubin, total 0.3 12/27/2021 09:08 AM    ALT (SGPT) 40 12/27/2021 09:08 AM    Alk. phosphatase 109 12/27/2021 09:08 AM    Protein, total 6.6 12/27/2021 09:08 AM    Albumin 3.6 12/27/2021 09:08 AM    Globulin 3.0 12/27/2021 09:08 AM    A-G Ratio 1.2 12/27/2021 09:08 AM      Lab Results   Component Value Date/Time    Hemoglobin A1c 6.1 (H) 06/01/2021 12:00 AM    Hemoglobin A1c (POC) 5.6 12/27/2021 08:21 AM         Review of Systems   Constitutional: Negative for malaise/fatigue. HENT: Negative for congestion. Eyes: Negative for blurred vision. Respiratory: Negative for cough and shortness of breath. Cardiovascular: Negative for chest pain, palpitations and leg swelling. Gastrointestinal: Negative for abdominal pain, constipation and heartburn. Genitourinary: Negative for dysuria, frequency and urgency. Musculoskeletal: Negative for back pain and joint pain. Neurological: Negative for dizziness, tingling and headaches. Endo/Heme/Allergies: Negative for environmental allergies. Psychiatric/Behavioral: Negative for depression. The patient does not have insomnia. Physical Exam  Vitals and nursing note reviewed. Constitutional:       Appearance: Normal appearance. She is well-developed. Comments: BP (!) 134/59 (BP 1 Location: Right upper arm, BP Patient Position: Sitting, BP Cuff Size: Thigh)   Pulse 76   Temp 98.4 °F (36.9 °C)   Resp 17   Ht 5' 8\" (1.727 m)   Wt 238 lb 6.4 oz (108.1 kg)   SpO2 97%   BMI 36.25 kg/m²    HENT:      Right Ear: Tympanic membrane and ear canal normal.      Left Ear: Tympanic membrane and ear canal normal.   Neck:      Thyroid: No thyromegaly. Cardiovascular:      Rate and Rhythm: Normal rate and regular rhythm. Heart sounds: Normal heart sounds. Pulmonary:      Effort: Pulmonary effort is normal.      Breath sounds: Normal breath sounds. Abdominal:      General: Bowel sounds are normal.      Palpations: Abdomen is soft. There is no mass. Tenderness: There is no abdominal tenderness. Musculoskeletal:         General: Normal range of motion. Cervical back: Normal range of motion and neck supple. Right lower leg: No edema. Left lower leg: No edema. Lymphadenopathy:      Cervical: No cervical adenopathy. Skin:     General: Skin is warm and dry. Neurological:      General: No focal deficit present. Mental Status: She is alert and oriented to person, place, and time. Psychiatric:         Mood and Affect: Mood normal.       ASSESSMENT and PLAN  Diagnoses and all orders for this visit:    1. Essential hypertension, benign  Discussed sodium restriction, high k rich diet, maintaining ideal body weight and regular exercise program such as daily walking 30 min perday 4-5 times per week, as physiologic means to achieve blood pressure control. Medication compliance advised. 2. Type 2 diabetes mellitus without complication, without long-term current use of insulin ([facility])  a1c stable at 5.8%. Advise to take metformin with food to help decrease loose stools.   Increase fiber.    -     AMB POC HEMOGLOBIN A1C  -     AMB POC GLUCOSE, QUANTITATIVE, BLOOD  -     HEMOGLOBIN A1C WITH EAG; Future    3. Hypercholesterolemia  Continue to monitor. Work on diet and exercise.  -     LIPID PANEL; Future    4. Paroxysmal SVT (supraventricular tachycardia) (HCC)//  5. PAF (paroxysmal atrial fibrillation) (HCC)  Stable in NSR. 6. Mild depression  Stable     7. Gastroesophageal reflux disease without esophagitis  Stable     8. Encounter for medication monitoring  -     METABOLIC PANEL, COMPREHENSIVE; Future    9. Encounter for immunization  -     COVID-19, Mahamed COELMAN, DO NOT DILUTE, EDGAR-SUCROSE, (AGE 12 YRS+), PF, 30MCG/0.3 ML      Follow-up and Dispositions    · Return in about 6 months (around 12/20/2022) for medicare wellness exam.       reviewed diet, exercise and weight control  cardiovascular risk and specific lipid/LDL goals reviewed  reviewed medications and side effects in detail  specific diabetic recommendations: low cholesterol diet, weight control and daily exercise discussed, all medications, side effects and compliance discussed carefully, foot care discussed and Podiatry visits discussed, annual eye examinations at Ophthalmology discussed and glycohemoglobin and other lab monitoring discussed     I have discussed diagnosis listed in this note with pt and/or family. I have discussed treatment plans and options and the risk/benefit analysis of those options, including safe use of medications and possible medication side effects. Through the use of shared decision making we have agreed to the above plan. The patient has received an after-visit summary and questions were answered concerning future plans and follow up. Advise pt of any urgent changes then to proceed to the ER.

## 2022-06-20 NOTE — PROGRESS NOTES
Chief Complaint   Patient presents with    Follow-up     6 month check up HTN     After obtaining consent, and per orders of Dr. Tucker, injection of COVID 19 given by Eunice Kitchen. Patient instructed to remain in clinic for 20 minutes afterwards, and to report any adverse reaction to me immediately. 1. \"Have you been to the ER, urgent care clinic since your last visit? Hospitalized since your last visit? \" No    2. \"Have you seen or consulted any other health care providers outside of the [address] [place] [address] since your last visit? \" Yes Cardiology  3. For patients aged 39-70: Has the patient had a colonoscopy / FIT/ Cologuard? Yes - no Care Gap present      If the patient is female:    4. For patients aged 41-77: Has the patient had a mammogram within the past 2 years? Yes - no Care Gap present      5. For patients aged 21-65: Has the patient had a pap smear? NA - based on age or sex    There are no preventive care reminders to display for this patient.

## 2022-06-20 NOTE — PATIENT INSTRUCTIONS
Learning About COVID-19 and Flu Symptoms  How can you tell COVID-19 from the flu? COVID-19 and the flu have similar symptoms. The two can be hard to tell apart. The only way to know for sure which illness you have is to be tested. If you have questions about COVID-19 testing, ask your doctor or go to cdc.gov to use the COVID-19 Viral Testing Tool. Since the symptoms are so alike, it makes sense to act as if you have COVID-19 until your test results come back. This means staying home and limiting contact with people in your home. You'll need to wash your hands often and disinfect surfaces that you touch. And be sure to wear a mask when you're around other people. This is also good advice if you think you have the flu. COVID-19 and the flu have these symptoms in common:  · Fever or chills  · Cough  · Shortness of breath  · Fatigue (tiredness)  · Sore throat  · Runny or stuffy nose  · Muscle and body aches  · Headache  · Vomiting and diarrhea (more common in children than adults)  COVID-19 has another symptom that also may occur:  · New loss of taste or smell  COVID-19 symptoms may appear from 2 to 14 days after infection. Flu symptoms usually appear 1 to 4 days after infection. Why should you get a flu shot during the COVID-19 pandemic? It's important to get your yearly flu vaccine. Both the flu and COVID-19 can be active at the same time. You can get sick with both infections at once. And having both may make you more sick than getting just one. The flu vaccine won't protect you from COVID-19. But it can help prevent the flu or reduce its symptoms. If fewer people get very ill with the flu, this will help free up medical resources that are needed for COVID-19 patients. Where can you learn more? Go to http://www."[employer].com/  Enter C123 in the search box to learn more about \"Learning About COVID-19 and Flu Symptoms. \"  Current as of: March 26, 2021               Content Version: 13.2  © 9316-6865 [employer]. Care instructions adapted under license by [employer] (which disclaims liability or warranty for this information). If you have questions about a medical condition or this instruction, always ask your healthcare professional. [address] any warranty or liability for your use of this information.

## 2022-06-21 LAB
ALBUMIN SERPL-MCNC: 3.6 G/DL (ref 3.5–5)
ALBUMIN/GLOB SERPL: 1.1 {RATIO} (ref 1.1–2.2)
ALP SERPL-CCNC: 118 U/L (ref 45–117)
ALT SERPL-CCNC: 33 U/L (ref 12–78)
ANION GAP SERPL CALC-SCNC: 6 MMOL/L (ref 5–15)
AST SERPL-CCNC: 18 U/L (ref 15–37)
BILIRUB SERPL-MCNC: 0.3 MG/DL (ref 0.2–1)
BUN SERPL-MCNC: 20 MG/DL (ref 6–20)
BUN/CREAT SERPL: 18 (ref 12–20)
CALCIUM SERPL-MCNC: 9 MG/DL (ref 8.5–10.1)
CHLORIDE SERPL-SCNC: 106 MMOL/L (ref 97–108)
CHOLEST SERPL-MCNC: 243 MG/DL
CO2 SERPL-SCNC: 29 MMOL/L (ref 21–32)
CREAT SERPL-MCNC: 1.1 MG/DL (ref 0.55–1.02)
EST. AVERAGE GLUCOSE BLD GHB EST-MCNC: 120 MG/DL
GLOBULIN SER CALC-MCNC: 3.2 G/DL (ref 2–4)
GLUCOSE SERPL-MCNC: 111 MG/DL (ref 65–100)
HBA1C MFR BLD: 5.8 % (ref 4–5.6)
HDLC SERPL-MCNC: 51 MG/DL
HDLC SERPL: 4.8 {RATIO} (ref 0–5)
LDLC SERPL CALC-MCNC: 162.4 MG/DL (ref 0–100)
POTASSIUM SERPL-SCNC: 4.6 MMOL/L (ref 3.5–5.1)
PROT SERPL-MCNC: 6.8 G/DL (ref 6.4–8.2)
SODIUM SERPL-SCNC: 141 MMOL/L (ref 136–145)
TRIGL SERPL-MCNC: 148 MG/DL (ref ?–150)
VLDLC SERPL CALC-MCNC: 29.6 MG/DL

## 2022-10-04 ENCOUNTER — TELEPHONE (OUTPATIENT)
Dept: FAMILY MEDICINE CLINIC | Age: 72
End: 2022-10-04

## 2022-10-04 NOTE — TELEPHONE ENCOUNTER
Left detailed message about switching to either farxiga or ozempic for BS control and CV benefit. Could try the [facility] form of metformin also.

## 2022-10-04 NOTE — TELEPHONE ENCOUNTER
----- Message from Jonh Quintana sent at 10/4/2022 10:11 AM EDT -----  Regarding: FW: metformin Rx    ----- Message -----  From: Ric Hahn  Sent: 10/3/2022   4:10 PM EDT  To: Debbie Mcfadden  Subject: metformin Rx                                     [facility] am [facility] having awful diarrhea with my metformin!!! Yee Azevedo thinks there's an extended release  type that might work for me. What do you think?

## 2022-10-05 ENCOUNTER — TELEPHONE (OUTPATIENT)
Dept: FAMILY MEDICINE CLINIC | Age: 72
End: 2022-10-05

## 2022-10-05 RX ORDER — METFORMIN HYDROCHLORIDE 500 MG/1
1 TABLET, FILM COATED, EXTENDED RELEASE ORAL 2 TIMES DAILY WITH MEALS
Qty: 60 TABLET | Refills: 5 | Status: SHIPPED | OUTPATIENT
Start: 2022-10-05

## 2022-10-05 NOTE — TELEPHONE ENCOUNTER
----- Message from Jenifer Ding sent at 10/5/2022 11:08 AM EDT -----  Regarding: FW: new RX    ----- Message -----  From: Jeffrey Madrigal  Sent: 10/4/2022   6:21 PM EDT  To: [facility] Nurse Pool  Subject: new RX                                           Basim Gaytan, I did get your lengthy voice mail. Thanks for that. I wanted to let you know that this week is my VERY busy week. 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 I am working 10-12 hour days. Therefore, you have my permission to talk with Violeta Aguila and decide what for me to do. If I can't trust the two of you to decide, I may as well check out now!!! Once again, I sincerely appreciate all you do for me!!!   Thanks,  Guillermo Flynn

## 2022-10-11 ENCOUNTER — DOCUMENTATION ONLY (OUTPATIENT)
Dept: FAMILY MEDICINE CLINIC | Age: 72
End: 2022-10-11

## 2022-10-11 NOTE — PROGRESS NOTES
PRIOR AUTH FOR [address] Rosenbaum INITIATED VIA COVER [employer]. AUTH SUBMITTED FOR URGENT REVIEW. AWAITING OUTCOME.

## 2022-10-26 ENCOUNTER — TELEPHONE (OUTPATIENT)
Dept: FAMILY MEDICINE CLINIC | Age: 72
End: 2022-10-26

## 2022-10-26 NOTE — TELEPHONE ENCOUNTER
----- Message from Kaycee Will sent at 10/25/2022  4:06 PM EDT -----  Regarding: FW: metformin    ----- Message -----  From: Tavia Altman  Sent: 10/25/2022   1:50 PM EDT  To: Sivan Mcfadden  Subject: metformin                                        Hi Lulu, let's do the [place] [facility] [place]. I'm certainly willing to try anything at this point. Do you know if insurance covers it? Please call it into [facility], [place]. Thanks for your help.

## 2022-12-06 ENCOUNTER — PATIENT MESSAGE (OUTPATIENT)
Dept: FAMILY MEDICINE CLINIC | Age: 72
End: 2022-12-06

## 2022-12-07 ENCOUNTER — TELEPHONE (OUTPATIENT)
Dept: FAMILY MEDICINE CLINIC | Age: 72
End: 2022-12-07

## 2022-12-07 DIAGNOSIS — J45.20 MILD INTERMITTENT ASTHMA WITHOUT COMPLICATION: Primary | ICD-10-CM

## 2022-12-07 DIAGNOSIS — J20.9 ACUTE BRONCHITIS, UNSPECIFIED ORGANISM: ICD-10-CM

## 2022-12-07 RX ORDER — HYDROCODONE POLISTIREX AND CHLORPHENIRAMINE POLISTIREX 10; 8 MG/5ML; MG/5ML
5 SUSPENSION, EXTENDED RELEASE ORAL
Qty: 120 ML | Refills: 0 | Status: SHIPPED | OUTPATIENT
Start: 2022-12-07 | End: 2022-12-10

## 2022-12-07 RX ORDER — AMOXICILLIN AND CLAVULANATE POTASSIUM 875; 125 MG/1; MG/1
1 TABLET, FILM COATED ORAL 2 TIMES DAILY
Qty: 20 TABLET | Refills: 0 | Status: SHIPPED | OUTPATIENT
Start: 2022-12-07 | End: 2022-12-17

## 2022-12-07 NOTE — TELEPHONE ENCOUNTER
----- Message from Claudette Rosenbaum sent at 12/7/2022  4:28 PM EST -----  Regarding: FW: my chest crud  Pt requesting Tussionex be sent to her [address] in [place]. Gus Lawson, I currently have a chest cold and cough (first time in I think 3 years! !!)  Losing my voice today and coughing quite a bit. Will you please write me a script for tussionex? I think that's the one. I will drive to the office this afternoon to  the script. Maybe one for antibiotic also (just in case)  Please have someone call me when its ready to pickup. Thanks so much!!! We moved in to [facility] last Thursday. Not a good time to be sick. ----- Message -----  From: Joycelyn Gonzalez  Sent: 12/7/2022   3:23 PM EST  To: [facility] Nurse Pool  Subject: my chest crud                                    Thanks so much.   [facility] at [place]

## 2022-12-28 ENCOUNTER — OFFICE VISIT (OUTPATIENT)
Dept: FAMILY MEDICINE CLINIC | Age: 72
End: 2022-12-28
Payer: MEDICARE

## 2022-12-28 VITALS
WEIGHT: 235.4 LBS | HEART RATE: 74 BPM | RESPIRATION RATE: 12 BRPM | OXYGEN SATURATION: 94 % | SYSTOLIC BLOOD PRESSURE: 108 MMHG | DIASTOLIC BLOOD PRESSURE: 57 MMHG | TEMPERATURE: 98.4 F | BODY MASS INDEX: 35.68 KG/M2 | HEIGHT: 68 IN

## 2022-12-28 DIAGNOSIS — E66.9 NON MORBID OBESITY: ICD-10-CM

## 2022-12-28 DIAGNOSIS — I10 ESSENTIAL HYPERTENSION, BENIGN: ICD-10-CM

## 2022-12-28 DIAGNOSIS — K21.9 GASTROESOPHAGEAL REFLUX DISEASE WITHOUT ESOPHAGITIS: ICD-10-CM

## 2022-12-28 DIAGNOSIS — E78.00 HYPERCHOLESTEROLEMIA: ICD-10-CM

## 2022-12-28 DIAGNOSIS — F32.A MILD DEPRESSION: ICD-10-CM

## 2022-12-28 DIAGNOSIS — Z51.81 ENCOUNTER FOR MEDICATION MONITORING: ICD-10-CM

## 2022-12-28 DIAGNOSIS — Z00.00 MEDICARE ANNUAL WELLNESS VISIT, SUBSEQUENT: Primary | ICD-10-CM

## 2022-12-28 DIAGNOSIS — E11.9 TYPE 2 DIABETES MELLITUS WITHOUT COMPLICATION, WITHOUT LONG-TERM CURRENT USE OF INSULIN (HCC): ICD-10-CM

## 2022-12-28 DIAGNOSIS — I48.0 PAF (PAROXYSMAL ATRIAL FIBRILLATION) (HCC): ICD-10-CM

## 2022-12-28 LAB
GLUCOSE POC: 146 MG/DL
HBA1C MFR BLD HPLC: 6 %
UR CULT HOLD, URHOLD: NORMAL

## 2022-12-28 PROCEDURE — G8536 NO DOC ELDER MAL SCRN: HCPCS | Performed by: FAMILY MEDICINE

## 2022-12-28 PROCEDURE — G8399 PT W/DXA RESULTS DOCUMENT: HCPCS | Performed by: FAMILY MEDICINE

## 2022-12-28 PROCEDURE — G9899 SCRN MAM PERF RSLTS DOC: HCPCS | Performed by: FAMILY MEDICINE

## 2022-12-28 PROCEDURE — G8417 CALC BMI ABV UP PARAM F/U: HCPCS | Performed by: FAMILY MEDICINE

## 2022-12-28 PROCEDURE — 82947 ASSAY GLUCOSE BLOOD QUANT: CPT | Performed by: FAMILY MEDICINE

## 2022-12-28 PROCEDURE — G8427 DOCREV CUR MEDS BY ELIG CLIN: HCPCS | Performed by: FAMILY MEDICINE

## 2022-12-28 PROCEDURE — G0439 PPPS, SUBSEQ VISIT: HCPCS | Performed by: FAMILY MEDICINE

## 2022-12-28 PROCEDURE — 2022F DILAT RTA XM EVC RTNOPTHY: CPT | Performed by: FAMILY MEDICINE

## 2022-12-28 PROCEDURE — 1101F PT FALLS ASSESS-DOCD LE1/YR: CPT | Performed by: FAMILY MEDICINE

## 2022-12-28 PROCEDURE — 83036 HEMOGLOBIN GLYCOSYLATED A1C: CPT | Performed by: FAMILY MEDICINE

## 2022-12-28 PROCEDURE — 1123F ACP DISCUSS/DSCN MKR DOCD: CPT | Performed by: FAMILY MEDICINE

## 2022-12-28 PROCEDURE — G0463 HOSPITAL OUTPT CLINIC VISIT: HCPCS | Performed by: FAMILY MEDICINE

## 2022-12-28 PROCEDURE — 99214 OFFICE O/P EST MOD 30 MIN: CPT | Performed by: FAMILY MEDICINE

## 2022-12-28 PROCEDURE — 3044F HG A1C LEVEL LT 7.0%: CPT | Performed by: FAMILY MEDICINE

## 2022-12-28 PROCEDURE — 3017F COLORECTAL CA SCREEN DOC REV: CPT | Performed by: FAMILY MEDICINE

## 2022-12-28 PROCEDURE — G9717 DOC PT DX DEP/BP F/U NT REQ: HCPCS | Performed by: FAMILY MEDICINE

## 2022-12-28 PROCEDURE — 3078F DIAST BP <80 MM HG: CPT | Performed by: FAMILY MEDICINE

## 2022-12-28 PROCEDURE — 1090F PRES/ABSN URINE INCON ASSESS: CPT | Performed by: FAMILY MEDICINE

## 2022-12-28 PROCEDURE — 3074F SYST BP LT 130 MM HG: CPT | Performed by: FAMILY MEDICINE

## 2022-12-28 RX ORDER — RIVAROXABAN 20 MG/1
1 TABLET, FILM COATED ORAL DAILY
COMMUNITY
Start: 2022-11-06

## 2022-12-28 NOTE — PROGRESS NOTES
Patient identified by 2 identifiers. Chief Complaint   Patient presents with    Annual Wellness Visit    Diabetes     1. Have you been to the ER, urgent care clinic since your last visit? Hospitalized since your last visit? No    2. Have you seen or consulted any other health care providers outside of the [address] [place] [address] since your last visit? Include any pap smears or colon screening.  No

## 2022-12-28 NOTE — PROGRESS NOTES
HISTORY OF PRESENT ILLNESS  Shana Sacks is a 71 y.o. female. HPI   Follow up on chronic medical problems. Feeling well. 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 Cardiovascular Review:  The patient has hypertension, hyperlipidemia and obesity. H/o PAF and SVT. S/p ablation by cardiology. Diet and Lifestyle: generally follows a low fat low cholesterol diet, generally follows a low sodium diet, follows a diabetic diet regularly, exercises sporadically but has started back with more regular walking. Pertinent ROS: taking medications as instructed, no medication side effects noted, no TIA's, no chest pain on exertion, no dyspnea on exertion, no swelling of ankles. She has been intolerant to the statins for her cholesterol. Diabetes Mellitus:  Compliant w/ meds, diabetic diet, and exercise. BS overall are doing good with the change to [place] [facility] [place]. Stopped metformin d/t GI side effects of diarrhea. Diabetic ROS - diabetic diet compliance: compliant most of the time, further diabetic ROS: no polyuria or polydipsia, no chest pain, dyspnea or TIA's, no numbness, tingling or pain in extremities, no unusual visual symptoms. Lab review: orders written for new lab studies as appropriate; see orders. Depression Review:  Patient is seen for followup of depression. Treatment includes Wellbutrin, Lexapro. Ongoing symptoms include none, feeling great. She denies depressed mood, insomnia and fatigue. She experiences the following side effects from the treatment: none. HM:  Mammogram 3/15/2021(has schedule with her GYN)  Bone density 11/15/2019    Colonoscopy 10/5/2021 by Dr. Paul Garcia repeat in 3 years. Patient Active Problem List   Diagnosis Code    Depression F32. A    Anxiety F41.9    Hypercholesterolemia E78.00    History of hepatitis B Z86.19    Glucose intolerance (impaired glucose tolerance) R73.02    Essential hypertension, benign I10    Encounter for medication monitoring Z51.81    H/O atrial fibrillation without current medication Z86.79    Asthmatic bronchitis J45.909    Environmental and seasonal allergies J30.89    Severe obesity (BMI 35.0-39. 9) with comorbidity (HCC) E66.01       Current Outpatient Medications   Medication Sig Dispense Refill    SITagliptin (JANUVIA) 50 mg tablet Take 1 Tablet by mouth daily. 30 Tablet 3    Glumetza 500 mg TG24 24 hour tablet Take 500 mg by mouth two (2) times daily (with meals). 60 Tablet 5    buPROPion XL (WELLBUTRIN XL) 300 mg XL tablet TAKE 1 TABLET EVERY MORNING 90 Tablet 3    escitalopram oxalate (LEXAPRO) 20 mg tablet TAKE 1 TABLET DAILY 90 Tablet 3    losartan (COZAAR) 50 mg tablet TAKE 1 TABLET DAILY 90 Tablet 3    pantoprazole (PROTONIX) 40 mg tablet Take 1 Tablet by mouth daily. 90 Tablet 3    metoprolol succinate (TOPROL-XL) 100 mg tablet Take 1 Tablet by mouth daily. 90 Tablet 3    OTHER Flax seed oil daily      triamcinolone acetonide (KENALOG) 0.1 % topical cream APPLY CREAM EXTERNALLY TWICE DAILY AS NEEDED TO RASH FOR FLARE UP      Blood-Glucose Meter monitoring kit Please provide meter that is covered by insurance. E11.9 1 Kit 0    glucose blood VI test strips (blood glucose test) strip Please provide test strips that insurance will cover to test BS daily, E11.9 100 Strip 3    lancets misc Test Blood Sugar daily, E11.9 100 Each 3    albuterol (PROVENTIL VENTOLIN) 2.5 mg /3 mL (0.083 %) nebulizer solution 3 mL by Nebulization route every four (4) hours as needed for Wheezing. 24 Each 11    albuterol (PROVENTIL HFA, VENTOLIN HFA, PROAIR HFA) 90 mcg/actuation inhaler Take 1-2 Puffs by inhalation every four (4) hours as needed for Wheezing. 1 Inhaler 11    cinnamon bark (CINNAMON) 500 mg cap Take 500 mg by mouth two (2) times a day.  Cholecalciferol, Vitamin D3, (VITAMIN D) 2,000 unit Cap Take 1 Tab by mouth daily.  MULTIVITAMIN PO Take 1 Tab by mouth daily.          Allergies   Allergen Reactions    Iodinated Contrast Media Swelling    Clindamycin Nausea and Vomiting    Codeine Other (comments)     Headaches      Morphine Other (comments)     headache           Past Medical History:   Diagnosis Date    Anxiety     Arrhythmia     hx A-Fib    Arthritis     knees    Asthma     Depression     History of hepatitis B     Hypercholesterolemia     Hypertension     Renal artery stenosis ([facility] [address] 2006           Past Surgical History:   Procedure Laterality Date    COLONOSCOPY N/A 10/5/2021    COLONOSCOPY   :- performed by Antione Sim MD at [facility]COPY    HX BLEPHAROPLASTY  04/26/2016    HX CATARACT REMOVAL  12/2019    right    HX CATARACT REMOVAL  01/2020    left     HX CHOLECYSTECTOMY Left 05/13/2021    [facility] joint orthoplasty / Dr. Fragoso Call    HX COLONOSCOPY  05/20/2016    \"    HX HYSTERECTOMY  1988    HX KNEE REPLACEMENT  05/22/2018    right knee    HX ORTHOPAEDIC  05/21/2020    right thumb    [place] CARDIAC SURG PROCEDURE UNLIST  4/28/2015    cardiac ablation     [place] COLONOSCOPY FLX DX W/COLLJ SPEC WHEN PFRMD  12/01/2010    dr [place]           Problem  Relation  Age of Onset    Cancer  Mother      multiple sites    Cancer  Father      lung    Heart Disease  Father    Heart Disease  Brother    Heart Attack  Brother    Lung Disease  Brother      COPD    Other  Sister      gastroparesis    Hypertension  Brother    Elevated Lipids  Brother       Social History     Tobacco Use    Smoking status: Never    Smokeless tobacco: Never   Substance Use Topics    Alcohol use:  Yes     Alcohol/week: 0.0 standard drinks     Comment: socially        Lab Results   Component Value Date/Time    WBC 5.1 12/27/2021 09:08 AM    HGB 14.1 12/27/2021 09:08 AM    HCT 44.1 12/27/2021 09:08 AM    PLATELET 110 35/60/5330 09:08 AM    .1 (H) 12/27/2021 09:08 AM     Lab Results   Component Value Date/Time    Cholesterol, total 243 (H) 06/20/2022 08:57 AM    HDL Cholesterol 51 06/20/2022 08:57 AM LDL, calculated 162.4 (H) 06/20/2022 08:57 AM    Triglyceride 148 06/20/2022 08:57 AM    CHOL/HDL Ratio 4.8 06/20/2022 08:57 AM     Lab Results   Component Value Date/Time    TSH 3.880 10/26/2016 07:50 AM    T4, Free 1.14 01/28/2015 02:43 PM      Lab Results   Component Value Date/Time    Sodium 141 06/20/2022 08:57 AM    Potassium 4.6 06/20/2022 08:57 AM    Chloride 106 06/20/2022 08:57 AM    CO2 29 06/20/2022 08:57 AM    Anion gap 6 06/20/2022 08:57 AM    Glucose 111 (H) 06/20/2022 08:57 AM    BUN 20 06/20/2022 08:57 AM    Creatinine 1.10 (H) 06/20/2022 08:57 AM    BUN/Creatinine ratio 18 06/20/2022 08:57 AM    GFR est AA 60 (L) 06/20/2022 08:57 AM    GFR est non-AA 49 (L) 06/20/2022 08:57 AM    Calcium 9.0 06/20/2022 08:57 AM    Bilirubin, total 0.3 06/20/2022 08:57 AM    ALT (SGPT) 33 06/20/2022 08:57 AM    Alk. phosphatase 118 (H) 06/20/2022 08:57 AM    Protein, total 6.8 06/20/2022 08:57 AM    Albumin 3.6 06/20/2022 08:57 AM    Globulin 3.2 06/20/2022 08:57 AM    A-G Ratio 1.1 06/20/2022 08:57 AM      Lab Results   Component Value Date/Time    Hemoglobin A1c 5.8 (H) 06/20/2022 08:57 AM    Hemoglobin A1c (POC) 5.8 06/20/2022 08:31 AM         Review of Systems   Constitutional:  Negative for malaise/fatigue. HENT:  Negative for congestion. Eyes:  Negative for blurred vision. Respiratory:  Negative for cough and shortness of breath. Cardiovascular:  Negative for chest pain, palpitations and leg swelling. Gastrointestinal:  Negative for abdominal pain, constipation and heartburn. Genitourinary:  Negative for dysuria, frequency and urgency. Musculoskeletal:  Negative for back pain and joint pain. Neurological:  Negative for dizziness, tingling and headaches. Endo/Heme/Allergies:  Negative for environmental allergies. Psychiatric/Behavioral:  Negative for depression. The patient does not have insomnia. Physical Exam  Vitals and nursing note reviewed.    Constitutional:       Appearance: Normal appearance. She is well-developed. Comments: /75 (BP 1 Location: Left arm, BP Patient Position: Sitting)   Pulse 61   Temp 97.3 °F (36.3 °C) (Oral)   Resp 18   Ht 5' 8\" (1.727 m)   Wt 231 lb 6.4 oz (105 kg)   SpO2 98%   BMI 35.18 kg/m²    HENT:      Right Ear: Tympanic membrane and ear canal normal.      Left Ear: Tympanic membrane and ear canal normal.      Nose: No mucosal edema. Neck:      Thyroid: No thyromegaly. Vascular: No carotid bruit. Cardiovascular:      Rate and Rhythm: Normal rate and regular rhythm. Pulses: Normal pulses. Heart sounds: Normal heart sounds. No gallop. Pulmonary:      Effort: Pulmonary effort is normal.      Breath sounds: Normal breath sounds. Chest:   Breasts:     Right: Normal.      Left: Normal.   Abdominal:      General: Bowel sounds are normal.      Palpations: Abdomen is soft. There is no mass. Tenderness: There is no abdominal tenderness. Musculoskeletal:         General: Normal range of motion. Cervical back: Normal range of motion and neck supple. Right lower leg: No edema. Left lower leg: No edema. Comments: Foot exam done . Normal sensation to PP, LT and vibration. Sensation intact to microfilament testing. Pulses intact. No swelling. No skin lesions or sores noted. No tinea present. Has some callous on medial great toes. Lymphadenopathy:      Cervical: No cervical adenopathy. Upper Body:      Right upper body: No supraclavicular, axillary or pectoral adenopathy. Left upper body: No supraclavicular, axillary or pectoral adenopathy. Skin:     General: Skin is warm and dry. Neurological:      General: No focal deficit present. Mental Status: She is alert and oriented to person, place, and time. Psychiatric:         Mood and Affect: Mood normal.     ASSESSMENT and PLAN  Diagnoses and all orders for this visit:    1. Medicare annual wellness visit, subsequent    2. Essential hypertension, benign  Stable     3. Type 2 diabetes mellitus without complication, without long-term current use of insulin ([facility])  A1c stable at 6.0%. Continue to monitor. Work on diet and exercise. -     AMB POC HEMOGLOBIN A1C  -     AMB POC GLUCOSE, QUANTITATIVE, BLOOD  -     MICROALBUMIN, UR, RAND W/ MICROALB/CREAT RATIO; Future  -     SITagliptin phosphate (JANUVIA) 50 mg tablet; Take 1 Tablet by mouth daily. 4. Hypercholesterolemia  -     LIPID PANEL; Future    5. PAF (paroxysmal atrial fibrillation) (HCC)  Stable in NSR    6. Mild depression  Stable on current regimen. 7. Gastroesophageal reflux disease without esophagitis  Stable on protonix    8. Encounter for medication monitoring  -     CBC W/O DIFF; Future  -     METABOLIC PANEL, COMPREHENSIVE; Future  -     URINALYSIS W/MICROSCOPIC; Future    9. Non Morbid Obesity   I have reviewed/discussed the above normal BMI with the patient. I have recommended the following interventions: dietary management education, guidance, and counseling . Follow-up and Dispositions    Return in about 6 months (around 6/28/2023). reviewed diet, exercise and weight control  cardiovascular risk and specific lipid/LDL goals reviewed  reviewed medications and side effects in detail  specific diabetic recommendations: low cholesterol diet, weight control and daily exercise discussed, all medications, side effects and compliance discussed carefully, foot care discussed and Podiatry visits discussed, annual eye examinations at Ophthalmology discussed, and glycohemoglobin and other lab monitoring discussed      I have discussed diagnosis listed in this note with pt and/or family. I have discussed treatment plans and options and the risk/benefit analysis of those options, including safe use of medications and possible medication side effects. Through the use of shared decision making we have agreed to the above plan.  The patient has received an after-visit summary and questions were answered concerning future plans and follow up. Advise pt of any urgent changes then to proceed to the ER.

## 2022-12-28 NOTE — PROGRESS NOTES
This is a Subsequent Medicare Annual Wellness Exam (AWV) (Performed 12 months after IPPE or effective date of Medicare Part B enrollment)    I have reviewed the patient's medical history in detail and updated the computerized patient record. History     Patient Active Problem List   Diagnosis Code    Depression F32. A    Anxiety F41.9    Hypercholesterolemia E78.00    History of hepatitis B Z86.19    Glucose intolerance (impaired glucose tolerance) R73.02    Essential hypertension, benign I10    Encounter for medication monitoring Z51.81    H/O atrial fibrillation without current medication Z86.79    Asthmatic bronchitis J45.909    Environmental and seasonal allergies J30.89    Severe obesity (BMI 35.0-39. 9) with comorbidity (HCC) E66.01       Current Outpatient Medications   Medication Sig Dispense Refill    SITagliptin (JANUVIA) 50 mg tablet Take 1 Tablet by mouth daily. 30 Tablet 3    Glumetza 500 mg TG24 24 hour tablet Take 500 mg by mouth two (2) times daily (with meals). 60 Tablet 5    buPROPion XL (WELLBUTRIN XL) 300 mg XL tablet TAKE 1 TABLET EVERY MORNING 90 Tablet 3    escitalopram oxalate (LEXAPRO) 20 mg tablet TAKE 1 TABLET DAILY 90 Tablet 3    losartan (COZAAR) 50 mg tablet TAKE 1 TABLET DAILY 90 Tablet 3    pantoprazole (PROTONIX) 40 mg tablet Take 1 Tablet by mouth daily. 90 Tablet 3    metoprolol succinate (TOPROL-XL) 100 mg tablet Take 1 Tablet by mouth daily. 90 Tablet 3    OTHER Flax seed oil daily      triamcinolone acetonide (KENALOG) 0.1 % topical cream APPLY CREAM EXTERNALLY TWICE DAILY AS NEEDED TO RASH FOR FLARE UP      Blood-Glucose Meter monitoring kit Please provide meter that is covered by insurance.  E11.9 1 Kit 0    glucose blood VI test strips (blood glucose test) strip Please provide test strips that insurance will cover to test BS daily, E11.9 100 Strip 3    lancets misc Test Blood Sugar daily, E11.9 100 Each 3    albuterol (PROVENTIL VENTOLIN) 2.5 mg /3 mL (0.083 %) nebulizer solution 3 mL by Nebulization route every four (4) hours as needed for Wheezing. 24 Each 11    albuterol (PROVENTIL HFA, VENTOLIN HFA, PROAIR HFA) 90 mcg/actuation inhaler Take 1-2 Puffs by inhalation every four (4) hours as needed for Wheezing. 1 Inhaler 11    cinnamon bark (CINNAMON) 500 mg cap Take 500 mg by mouth two (2) times a day. Cholecalciferol, Vitamin D3, (VITAMIN D) 2,000 unit Cap Take 1 Tab by mouth daily. MULTIVITAMIN PO Take 1 Tab by mouth daily.          Allergies   Allergen Reactions    Iodinated Contrast Media Swelling    Clindamycin Nausea and Vomiting    Codeine Other (comments)     Headaches      Morphine Other (comments)     headache         Past Medical History:   Diagnosis Date    Anxiety     Arrhythmia     hx A-Fib    Arthritis     knees    Asthma     Depression     History of hepatitis B     Hypercholesterolemia     Hypertension     Renal artery stenosis ([facility] [address] 2006         Past Surgical History:   Procedure Laterality Date    COLONOSCOPY N/A 10/5/2021    COLONOSCOPY   :- performed by Yee Foley MD at [facility] ENDOSCOPY    HX BLEPHAROPLASTY  04/26/2016    HX CATARACT REMOVAL  12/2019    right    HX CATARACT REMOVAL  01/2020    left     HX CHOLECYSTECTOMY Left 05/13/2021    [facility] joint orthoplasty / Dr. Darren Burger COLONOSCOPY  05/20/2016    \"    HX HYSTERECTOMY  1988    HX KNEE REPLACEMENT  05/22/2018    right knee    HX ORTHOPAEDIC  05/21/2020    right thumb    [place] CARDIAC SURG PROCEDURE UNLIST  4/28/2015    cardiac ablation     [place] COLONOSCOPY FLX DX W/COLLJ SPEC WHEN PFRMD  12/01/2010    dr Rahul Sim         Family History   Problem Relation Age of Onset    Cancer Mother         multiple sites    Cancer Father         lung    Heart Disease Father     Heart Disease Brother     Heart Attack Brother     Lung Disease Brother         COPD    Other Sister         gastroparesis    Hypertension Brother     Elevated Lipids Brother        Social History     Tobacco Use    Smoking status: Never Smokeless tobacco: Never   Substance Use Topics    Alcohol use: Yes     Alcohol/week: 0.0 standard drinks     Comment: socially         Depression Risk Factor Screening:     PHQ over the last two weeks    Little interest or pleasure in doing things Not at all   Feeling down, depressed or hopeless Not at all   Total Score PHQ 2 0     Alcohol Risk Factor Screening: You do drink alcohol socially. Functional Ability and Level of Safety:   Hearing Loss  Hearing is good. Activities of Daily Living  The home contains: discussed safety equipment. Patient does total self care    Fall RiskFall Risk Assessment, last 12 mths    Able to walk? Yes   Fall in past 12 months? No     Functional Ability:   Does the patient exhibit a steady gait? yes    How long did it take the patient to get up and walk from a sitting position? seconds    Is the patient self reliant? (ie can do own laundry, meals, household chores)  yes   Does the patient handle his/her own medications? yes   Does the patient handle his/her own money? yes   Is the patients home safe (ie good lighting, handrails on stairs and bath, etc.)? yes   Did you notice or did patient express any hearing difficulties? no   Did you notice or did patient express any vision difficulties? no        Advance Care Planning:   Patient was offered the opportunity to discuss advance care planning:  yes    Does patient have an Advance Directive:  yes        Abuse Screen  Patient is not abused    Cognitive Screening   Evaluation of Cognitive Function:  Has your family/caregiver stated any concerns about your memory: no      Patient Care Team   Patient Care Team:  Daniel Acuña MD as PCP - General    Assessment/Plan   Education and counseling provided:  Are appropriate based on today's review and evaluation  End-of-Life planning (with patient's consent)    ASSESSMENT and PLAN    Medicare Annual Wellness  Continue current treatment plan. Continue annual follow up. I have discussed diagnosis listed in this note with pt and/or family. I have discussed treatment plans and options and the risk/benefit analysis of those options, including safe use of medications and possible medication side effects. Through the use of shared decision making we have agreed to the above plan. The patient has received an after-visit summary and questions were answered concerning future plans and follow up. Advise pt of any urgent changes then to proceed to the ER.

## 2022-12-29 LAB
ALBUMIN SERPL-MCNC: 3.8 G/DL (ref 3.5–5)
ALBUMIN/GLOB SERPL: 1.2 {RATIO} (ref 1.1–2.2)
ALP SERPL-CCNC: 100 U/L (ref 45–117)
ALT SERPL-CCNC: 68 U/L (ref 12–78)
ANION GAP SERPL CALC-SCNC: 4 MMOL/L (ref 5–15)
APPEARANCE UR: CLEAR
AST SERPL-CCNC: 48 U/L (ref 15–37)
BACTERIA URNS QL MICRO: NEGATIVE /HPF
BILIRUB SERPL-MCNC: 0.7 MG/DL (ref 0.2–1)
BILIRUB UR QL: NEGATIVE
BUN SERPL-MCNC: 30 MG/DL (ref 6–20)
BUN/CREAT SERPL: 19 (ref 12–20)
CALCIUM SERPL-MCNC: 9.8 MG/DL (ref 8.5–10.1)
CHLORIDE SERPL-SCNC: 107 MMOL/L (ref 97–108)
CHOLEST SERPL-MCNC: 258 MG/DL
CO2 SERPL-SCNC: 30 MMOL/L (ref 21–32)
COLOR UR: ABNORMAL
CREAT SERPL-MCNC: 1.59 MG/DL (ref 0.55–1.02)
CREAT UR-MCNC: 161 MG/DL
EPITH CASTS URNS QL MICRO: ABNORMAL /LPF
ERYTHROCYTE [DISTWIDTH] IN BLOOD BY AUTOMATED COUNT: 13.3 % (ref 11.5–14.5)
GLOBULIN SER CALC-MCNC: 3.2 G/DL (ref 2–4)
GLUCOSE SERPL-MCNC: 157 MG/DL (ref 65–100)
GLUCOSE UR STRIP.AUTO-MCNC: NEGATIVE MG/DL
HCT VFR BLD AUTO: 44.1 % (ref 35–47)
HDLC SERPL-MCNC: 42 MG/DL
HDLC SERPL: 6.1 {RATIO} (ref 0–5)
HGB BLD-MCNC: 14 G/DL (ref 11.5–16)
HGB UR QL STRIP: NEGATIVE
HYALINE CASTS URNS QL MICRO: ABNORMAL /LPF (ref 0–5)
KETONES UR QL STRIP.AUTO: NEGATIVE MG/DL
LDLC SERPL CALC-MCNC: 167.2 MG/DL (ref 0–100)
LEUKOCYTE ESTERASE UR QL STRIP.AUTO: ABNORMAL
MCH RBC QN AUTO: 32 PG (ref 26–34)
MCHC RBC AUTO-ENTMCNC: 31.7 G/DL (ref 30–36.5)
MCV RBC AUTO: 100.9 FL (ref 80–99)
MICROALBUMIN UR-MCNC: 2.93 MG/DL
MICROALBUMIN/CREAT UR-RTO: 18 MG/G (ref 0–30)
NITRITE UR QL STRIP.AUTO: NEGATIVE
NRBC # BLD: 0 K/UL (ref 0–0.01)
NRBC BLD-RTO: 0 PER 100 WBC
PH UR STRIP: 5.5 [PH] (ref 5–8)
PLATELET # BLD AUTO: 183 K/UL (ref 150–400)
PMV BLD AUTO: 12.4 FL (ref 8.9–12.9)
POTASSIUM SERPL-SCNC: 5.1 MMOL/L (ref 3.5–5.1)
PROT SERPL-MCNC: 7 G/DL (ref 6.4–8.2)
PROT UR STRIP-MCNC: NEGATIVE MG/DL
RBC # BLD AUTO: 4.37 M/UL (ref 3.8–5.2)
RBC #/AREA URNS HPF: ABNORMAL /HPF (ref 0–5)
SODIUM SERPL-SCNC: 141 MMOL/L (ref 136–145)
SP GR UR REFRACTOMETRY: 1.02 (ref 1–1.03)
TRIGL SERPL-MCNC: 244 MG/DL (ref ?–150)
UROBILINOGEN UR QL STRIP.AUTO: 0.2 EU/DL (ref 0.2–1)
VLDLC SERPL CALC-MCNC: 48.8 MG/DL
WBC # BLD AUTO: 7.8 K/UL (ref 3.6–11)
WBC URNS QL MICRO: ABNORMAL /HPF (ref 0–4)

## 2023-01-03 ENCOUNTER — TELEPHONE (OUTPATIENT)
Dept: FAMILY MEDICINE CLINIC | Age: 73
End: 2023-01-03

## 2023-01-03 DIAGNOSIS — R79.89 ELEVATED SERUM CREATININE: Primary | ICD-10-CM

## 2023-01-03 NOTE — TELEPHONE ENCOUNTER
Creatinine is elevated. Will stopped Januvia(newest medication added. Monitor BS with diet. Repeat creatinine level in 1 month.

## 2023-01-10 DIAGNOSIS — F32.A DEPRESSION, UNSPECIFIED DEPRESSION TYPE: ICD-10-CM

## 2023-01-10 DIAGNOSIS — K21.9 GASTROESOPHAGEAL REFLUX DISEASE WITHOUT ESOPHAGITIS: ICD-10-CM

## 2023-01-10 DIAGNOSIS — I47.1 PAROXYSMAL SVT (SUPRAVENTRICULAR TACHYCARDIA) (HCC): ICD-10-CM

## 2023-01-10 RX ORDER — METOPROLOL SUCCINATE 100 MG/1
TABLET, EXTENDED RELEASE ORAL
Qty: 90 TABLET | Refills: 3 | Status: SHIPPED | OUTPATIENT
Start: 2023-01-10

## 2023-01-10 RX ORDER — LOSARTAN POTASSIUM 50 MG/1
TABLET ORAL
Qty: 90 TABLET | Refills: 3 | Status: SHIPPED | OUTPATIENT
Start: 2023-01-10

## 2023-01-10 RX ORDER — PANTOPRAZOLE SODIUM 40 MG/1
TABLET, DELAYED RELEASE ORAL
Qty: 90 TABLET | Refills: 3 | Status: SHIPPED | OUTPATIENT
Start: 2023-01-10

## 2023-01-10 RX ORDER — BUPROPION HYDROCHLORIDE 300 MG/1
TABLET ORAL
Qty: 90 TABLET | Refills: 3 | Status: SHIPPED | OUTPATIENT
Start: 2023-01-10

## 2023-01-10 RX ORDER — ESCITALOPRAM OXALATE 20 MG/1
TABLET ORAL
Qty: 90 TABLET | Refills: 3 | Status: SHIPPED | OUTPATIENT
Start: 2023-01-10

## 2023-02-02 ENCOUNTER — TELEPHONE (OUTPATIENT)
Dept: FAMILY MEDICINE CLINIC | Age: 73
End: 2023-02-02

## 2023-02-09 ENCOUNTER — TELEPHONE (OUTPATIENT)
Dept: FAMILY MEDICINE CLINIC | Age: 73
End: 2023-02-09

## 2023-03-06 ENCOUNTER — OFFICE VISIT (OUTPATIENT)
Dept: FAMILY MEDICINE CLINIC | Age: 73
End: 2023-03-06
Payer: MEDICARE

## 2023-03-06 VITALS
TEMPERATURE: 97.6 F | HEART RATE: 63 BPM | RESPIRATION RATE: 12 BRPM | HEIGHT: 68 IN | DIASTOLIC BLOOD PRESSURE: 62 MMHG | OXYGEN SATURATION: 97 % | BODY MASS INDEX: 35.46 KG/M2 | WEIGHT: 234 LBS | SYSTOLIC BLOOD PRESSURE: 127 MMHG

## 2023-03-06 DIAGNOSIS — M17.12 PRIMARY OSTEOARTHRITIS OF LEFT KNEE: ICD-10-CM

## 2023-03-06 DIAGNOSIS — Z51.81 ENCOUNTER FOR MEDICATION MONITORING: ICD-10-CM

## 2023-03-06 DIAGNOSIS — E78.00 HYPERCHOLESTEROLEMIA: ICD-10-CM

## 2023-03-06 DIAGNOSIS — F32.A MILD DEPRESSION: ICD-10-CM

## 2023-03-06 DIAGNOSIS — I48.0 PAF (PAROXYSMAL ATRIAL FIBRILLATION) (HCC): ICD-10-CM

## 2023-03-06 DIAGNOSIS — Z01.818 PRE-OP EXAM: Primary | ICD-10-CM

## 2023-03-06 DIAGNOSIS — J45.20 MILD INTERMITTENT ASTHMA WITHOUT COMPLICATION: ICD-10-CM

## 2023-03-06 DIAGNOSIS — E66.9 NON MORBID OBESITY: ICD-10-CM

## 2023-03-06 DIAGNOSIS — I10 ESSENTIAL HYPERTENSION, BENIGN: ICD-10-CM

## 2023-03-06 DIAGNOSIS — K21.9 GASTROESOPHAGEAL REFLUX DISEASE WITHOUT ESOPHAGITIS: ICD-10-CM

## 2023-03-06 DIAGNOSIS — E11.9 TYPE 2 DIABETES MELLITUS WITHOUT COMPLICATION, WITHOUT LONG-TERM CURRENT USE OF INSULIN (HCC): ICD-10-CM

## 2023-03-06 PROCEDURE — G9717 DOC PT DX DEP/BP F/U NT REQ: HCPCS | Performed by: FAMILY MEDICINE

## 2023-03-06 PROCEDURE — 99214 OFFICE O/P EST MOD 30 MIN: CPT | Performed by: FAMILY MEDICINE

## 2023-03-06 PROCEDURE — 1090F PRES/ABSN URINE INCON ASSESS: CPT | Performed by: FAMILY MEDICINE

## 2023-03-06 PROCEDURE — G8399 PT W/DXA RESULTS DOCUMENT: HCPCS | Performed by: FAMILY MEDICINE

## 2023-03-06 PROCEDURE — G8417 CALC BMI ABV UP PARAM F/U: HCPCS | Performed by: FAMILY MEDICINE

## 2023-03-06 PROCEDURE — 1123F ACP DISCUSS/DSCN MKR DOCD: CPT | Performed by: FAMILY MEDICINE

## 2023-03-06 PROCEDURE — 3046F HEMOGLOBIN A1C LEVEL >9.0%: CPT | Performed by: FAMILY MEDICINE

## 2023-03-06 PROCEDURE — G8427 DOCREV CUR MEDS BY ELIG CLIN: HCPCS | Performed by: FAMILY MEDICINE

## 2023-03-06 PROCEDURE — 1101F PT FALLS ASSESS-DOCD LE1/YR: CPT | Performed by: FAMILY MEDICINE

## 2023-03-06 PROCEDURE — G9899 SCRN MAM PERF RSLTS DOC: HCPCS | Performed by: FAMILY MEDICINE

## 2023-03-06 PROCEDURE — G8536 NO DOC ELDER MAL SCRN: HCPCS | Performed by: FAMILY MEDICINE

## 2023-03-06 PROCEDURE — G0463 HOSPITAL OUTPT CLINIC VISIT: HCPCS | Performed by: FAMILY MEDICINE

## 2023-03-06 PROCEDURE — 2022F DILAT RTA XM EVC RTNOPTHY: CPT | Performed by: FAMILY MEDICINE

## 2023-03-06 PROCEDURE — 3017F COLORECTAL CA SCREEN DOC REV: CPT | Performed by: FAMILY MEDICINE

## 2023-03-06 PROCEDURE — 3078F DIAST BP <80 MM HG: CPT | Performed by: FAMILY MEDICINE

## 2023-03-06 PROCEDURE — 3074F SYST BP LT 130 MM HG: CPT | Performed by: FAMILY MEDICINE

## 2023-03-06 NOTE — PROGRESS NOTES
HISTORY OF PRESENT ILLNESS  Dee Jackson is a 71 y.o. female. HPI     Pre-op for left TKR. Cardiovascular Review:  The patient has hypertension, hyperlipidemia and obesity. H/o PAF and SVT. S/p ablation by cardiology. She is on xeralto for recurrent PAF. Diet and Lifestyle: generally follows a low fat low cholesterol diet, generally follows a low sodium diet, follows a diabetic diet regularly, exercises sporadically but has started back with more regular walking. Pertinent ROS: taking medications as instructed, no medication side effects noted, no TIA's, no chest pain on exertion, no dyspnea on exertion, no swelling of ankles. She has been intolerant to the statins for her cholesterol. Diabetes Mellitus:  Compliant w/ meds, diabetic diet, and exercise. Last a1c level in 12/2022 was at 6.0%. Had raise in her creatinine when she was on Januvia for her BS so this was stopped. She is now diet controlled and has been doing well with her BS in the low 100s. Stopped metformin d/t GI side effects of diarrhea. Diabetic ROS - diabetic diet compliance: compliant most of the time, further diabetic ROS: no polyuria or polydipsia, no chest pain, dyspnea or TIA's, no numbness, tingling or pain in extremities, no unusual visual symptoms. Lab review: orders written for new lab studies as appropriate; see orders. Depression Review:  Patient is seen for followup of depression. Treatment includes Wellbutrin, Lexapro. Ongoing symptoms include none, feeling great. She denies depressed mood, insomnia and fatigue. She experiences the following side effects from the treatment: none. HM:  Mammogram 2/1/23  Bone density 11/15/2019  Colonoscopy 10/5/2021 by Dr. Vlad Munoz repeat in 3 years. Patient Active Problem List   Diagnosis Code    Depression F32. A    Anxiety F41.9    Hypercholesterolemia E78.00    History of hepatitis B Z86.19    Glucose intolerance (impaired glucose tolerance) R73.02 Essential hypertension, benign I10    Encounter for medication monitoring Z51.81    H/O atrial fibrillation without current medication Z86.79    Asthmatic bronchitis J45.909    Environmental and seasonal allergies J30.89    Severe obesity (BMI 35.0-39. 9) with comorbidity (HCC) E66.01       Current Outpatient Medications   Medication Sig Dispense Refill    CINNAMON BARK PO Take 1,200 mg by mouth daily. metoprolol succinate (TOPROL-XL) 100 mg tablet TAKE 1 TABLET DAILY 90 Tablet 3    losartan (COZAAR) 50 mg tablet TAKE 1 TABLET DAILY 90 Tablet 3    escitalopram oxalate (LEXAPRO) 20 mg tablet TAKE 1 TABLET DAILY 90 Tablet 3    pantoprazole (PROTONIX) 40 mg tablet TAKE 1 TABLET DAILY 90 Tablet 3    buPROPion XL (WELLBUTRIN XL) 300 mg XL tablet TAKE 1 TABLET EVERY MORNING 90 Tablet 3    Xarelto 20 mg tab tablet Take 1 Tablet by mouth daily. OTHER Flax seed oil daily      triamcinolone acetonide (KENALOG) 0.1 % topical cream APPLY CREAM EXTERNALLY TWICE DAILY AS NEEDED TO RASH FOR FLARE UP      Blood-Glucose Meter monitoring kit Please provide meter that is covered by insurance. E11.9 1 Kit 0    glucose blood VI test strips (blood glucose test) strip Please provide test strips that insurance will cover to test BS daily, E11.9 100 Strip 3    lancets misc Test Blood Sugar daily, E11.9 100 Each 3    albuterol (PROVENTIL VENTOLIN) 2.5 mg /3 mL (0.083 %) nebulizer solution 3 mL by Nebulization route every four (4) hours as needed for Wheezing. 24 Each 11    albuterol (PROVENTIL HFA, VENTOLIN HFA, PROAIR HFA) 90 mcg/actuation inhaler Take 1-2 Puffs by inhalation every four (4) hours as needed for Wheezing. 1 Inhaler 11    cholecalciferol (VITAMIN D3) (2,000 UNITS /50 MCG) cap capsule Take 1 Tab by mouth daily. MULTIVITAMIN PO Take 1 Tab by mouth daily.          Allergies   Allergen Reactions    Clindamycin Nausea and Vomiting     Other reaction(s): GI intolerance, Severity: Mild    Codeine Other (comments) Headaches    Other reaction(s): Adverse reaction to substance ([id number]); Severity: Mild, Other (see comments)  Severe headaches  Headaches      Iodinated Contrast Media Swelling     Other reaction(s): Other (see comments)  Swelling of throat    Morphine Other (comments)     headache  Other reaction(s): Adverse reaction to substance ([id number]);  Severity: Mild, Other (see comments)  Severe headaches  headache      Metformin Diarrhea           Past Medical History:   Diagnosis Date    Anxiety     Arrhythmia     hx A-Fib    Arthritis     knees    Asthma     Depression     Diabetes ([facility] [address]     GERD (gastroesophageal reflux disease)     History of hepatitis B     Hypercholesterolemia     Hypertension     Renal artery stenosis ([facility] [address] 2006           Past Surgical History:   Procedure Laterality Date    COLONOSCOPY N/A 10/05/2021    COLONOSCOPY   :- performed by Snehal Blas MD at [facility] ENDOSCOPY    HX BLEPHAROPLASTY  04/26/2016    HX CATARACT REMOVAL  12/2019    right    HX CATARACT REMOVAL  01/2020    left     HX CHOLECYSTECTOMY Left 05/13/2021    [facility] joint orthoplasty / Dr. Chapito Barrear COLONOSCOPY  05/20/2016    \"    HX ENDOSCOPY  12/01/2015    HX HYSTERECTOMY  1988    HX KNEE REPLACEMENT  05/22/2018    right knee    HX ORTHOPAEDIC  05/21/2020    right thumb    TX COLONOSCOPY FLX DX W/COLLJ SPEC WHEN PFRMD  12/01/2010    dr Mayur Plascencia  04/28/2015    cardiac ablation            Family History   Problem Relation Age of Onset    Cancer Mother         multiple locations    OSTEOARTHRITIS Mother     Diabetes Mother         diet controlled    Elevated Lipids Mother     Cancer Father         lung    Heart Disease Father     Elevated Lipids Father     Lung Disease Father     Other Sister         gastroparesis    Gall Bladder Disease Sister         removed 1970    Heart Disease Brother     Heart Attack Brother     Lung Disease Brother         COPD    Asthma Brother     Cancer Brother lung    Elevated Lipids Brother         meds    Hypertension Brother     Elevated Lipids Brother         meds    Heart Disease Brother     Lung Disease Brother     OSTEOARTHRITIS Maternal Grandfather     Cancer Maternal Grandfather         multiple locations    Stroke Maternal Grandfather     Stroke Paternal Grandmother        Social History     Tobacco Use    Smoking status: Never    Smokeless tobacco: Never   Substance Use Topics    Alcohol use: Yes     Alcohol/week: 5.0 standard drinks     Types: 5 Glasses of wine per week     Comment: socially        Lab Results   Component Value Date/Time    WBC 7.8 12/28/2022 10:17 AM    HGB 14.0 12/28/2022 10:17 AM    HCT 44.1 12/28/2022 10:17 AM    PLATELET 463 26/71/1716 10:17 AM    .9 (H) 12/28/2022 10:17 AM     Lab Results   Component Value Date/Time    Cholesterol, total 258 (H) 12/28/2022 10:17 AM    HDL Cholesterol 42 12/28/2022 10:17 AM    LDL, calculated 167.2 (H) 12/28/2022 10:17 AM    Triglyceride 244 (H) 12/28/2022 10:17 AM    CHOL/HDL Ratio 6.1 (H) 12/28/2022 10:17 AM     Lab Results   Component Value Date/Time    TSH 3.880 10/26/2016 07:50 AM    T4, Free 1.14 01/28/2015 02:43 PM      Lab Results   Component Value Date/Time    Sodium 145 (H) 01/30/2023 09:45 AM    Potassium 4.7 01/30/2023 09:45 AM    Chloride 106 01/30/2023 09:45 AM    CO2 27 01/30/2023 09:45 AM    Anion gap 4 (L) 12/28/2022 10:17 AM    Glucose 132 (H) 01/30/2023 09:45 AM    BUN 14 01/30/2023 09:45 AM    Creatinine 1.03 (H) 01/30/2023 09:45 AM    BUN/Creatinine ratio 14 01/30/2023 09:45 AM    GFR est AA 60 (L) 06/20/2022 08:57 AM    GFR est non-AA 49 (L) 06/20/2022 08:57 AM    Calcium 9.4 01/30/2023 09:45 AM    Bilirubin, total 0.7 12/28/2022 10:17 AM    ALT (SGPT) 68 12/28/2022 10:17 AM    Alk.  phosphatase 100 12/28/2022 10:17 AM    Protein, total 7.0 12/28/2022 10:17 AM    Albumin 3.8 12/28/2022 10:17 AM    Globulin 3.2 12/28/2022 10:17 AM    A-G Ratio 1.2 12/28/2022 10:17 AM      Lab Results   Component Value Date/Time    Hemoglobin A1c 5.8 (H) 06/20/2022 08:57 AM    Hemoglobin A1c (POC) 6.0 12/28/2022 09:20 AM         Review of Systems   Constitutional:  Negative for malaise/fatigue. HENT:  Negative for congestion. Eyes:  Negative for blurred vision. Respiratory:  Negative for cough and shortness of breath. Cardiovascular:  Negative for chest pain, palpitations and leg swelling. Gastrointestinal:  Negative for abdominal pain, constipation and heartburn. Genitourinary:  Negative for dysuria, frequency and urgency. Musculoskeletal:  Negative for back pain. Neurological:  Negative for dizziness, tingling and headaches. Endo/Heme/Allergies:  Negative for environmental allergies. Psychiatric/Behavioral:  Negative for depression. The patient does not have insomnia. Physical Exam  Vitals and nursing note reviewed. Constitutional:       Appearance: Normal appearance. She is well-developed. Comments: /62   Pulse 63   Temp 97.6 °F (36.4 °C)   Resp 12   Ht 5' 8\" (1.727 m)   Wt 234 lb (106.1 kg)   SpO2 97%   BMI 35.58 kg/m²    HENT:      Right Ear: Tympanic membrane and ear canal normal.      Left Ear: Tympanic membrane and ear canal normal.   Neck:      Thyroid: No thyromegaly. Cardiovascular:      Rate and Rhythm: Normal rate and regular rhythm. Heart sounds: Normal heart sounds. Pulmonary:      Effort: Pulmonary effort is normal.      Breath sounds: Normal breath sounds. Abdominal:      General: Bowel sounds are normal.      Palpations: Abdomen is soft. There is no mass. Tenderness: There is no abdominal tenderness. Musculoskeletal:         General: Normal range of motion. Cervical back: Normal range of motion and neck supple. Right lower leg: No edema. Left lower leg: No edema. Lymphadenopathy:      Cervical: No cervical adenopathy. Skin:     General: Skin is warm and dry.    Neurological:      General: No focal deficit present. Mental Status: She is alert and oriented to person, place, and time. Psychiatric:         Mood and Affect: Mood normal.       ASSESSMENT and PLAN  Diagnoses and all orders for this visit:    1. Pre-op exam  2. Primary osteoarthritis of left knee  Medically clear for surgery pending review of her pre-op testing lab results. 3. Essential hypertension, benign  Stable     4. Type 2 diabetes mellitus without complication, without long-term current use of insulin (HCC)  Continue to monitor. Work on diet and exercise. 5. PAF (paroxysmal atrial fibrillation) (HCC)  Stable in NSR    6. Hypercholesterolemia  Continue to monitor. Work on diet and exercise. 7. Mild intermittent asthma without complication  Stable     8. Mild depression  Stable     9. Gastroesophageal reflux disease without esophagitis  Stable     10. Encounter for medication monitoring    11. Non morbid obesity  I have reviewed/discussed the above normal BMI with the patient. I have recommended the following interventions: dietary management education, guidance, and counseling . Follow-up and Dispositions    Return in about 6 months (around 9/6/2023). reviewed diet, exercise and weight control  cardiovascular risk and specific lipid/LDL goals reviewed  reviewed medications and side effects in detail  specific diabetic recommendations: low cholesterol diet, weight control and daily exercise discussed and glycohemoglobin and other lab monitoring discussed    I have discussed diagnosis listed in this note with pt and/or family. I have discussed treatment plans and options and the risk/benefit analysis of those options, including safe use of medications and possible medication side effects. Through the use of shared decision making we have agreed to the above plan. The patient has received an after-visit summary and questions were answered concerning future plans and follow up.   Advise pt of any urgent changes then to proceed to the ER.

## 2023-03-06 NOTE — PROGRESS NOTES
Patient identified by 2 identifiers. Chief Complaint   Patient presents with    Pre-op Exam     1. Have you been to the ER, urgent care clinic since your last visit? Hospitalized since your last visit? No    2. Have you seen or consulted any other health care providers outside of the [address] [place] [address] since your last visit? Include any pap smears or colon screening.  No

## 2023-06-29 LAB — HBA1C MFR BLD HPLC: 5.7 %

## 2023-12-12 ENCOUNTER — CLINICAL DOCUMENTATION (OUTPATIENT)
Age: 73
End: 2023-12-12

## 2023-12-12 NOTE — PROGRESS NOTES
Most recent Mammogram and Dexa reports faxed to [facility] today. Pt was called and made aware.

## 2025-06-10 ENCOUNTER — NURSE TRIAGE (OUTPATIENT)
Dept: OTHER | Facility: CLINIC | Age: 75
End: 2025-06-10

## 2025-06-10 NOTE — TELEPHONE ENCOUNTER
Kady is calling to confirm that a workman's comp claim that occurred in 1997 with an outside employer is closed.   Directed her to follow up with the a claims officer with the company or insurance she was covered by.

## (undated) DEVICE — REM POLYHESIVE ADULT PATIENT RETURN ELECTRODE: Brand: VALLEYLAB

## (undated) DEVICE — SNARE ENDOSCP M L240CM W27MM SHTH DIA2.4MM CHN 2.8MM OVL